# Patient Record
Sex: FEMALE | Race: OTHER | Employment: UNEMPLOYED | URBAN - METROPOLITAN AREA
[De-identification: names, ages, dates, MRNs, and addresses within clinical notes are randomized per-mention and may not be internally consistent; named-entity substitution may affect disease eponyms.]

---

## 2022-09-06 ENCOUNTER — HOSPITAL ENCOUNTER (EMERGENCY)
Age: 87
Discharge: HOME | End: 2022-09-06
Attending: EMERGENCY MEDICINE
Payer: COMMERCIAL

## 2022-09-06 VITALS
TEMPERATURE: 98.2 F | HEART RATE: 61 BPM | DIASTOLIC BLOOD PRESSURE: 61 MMHG | BODY MASS INDEX: 28.32 KG/M2 | HEIGHT: 65 IN | WEIGHT: 170 LBS | SYSTOLIC BLOOD PRESSURE: 107 MMHG | RESPIRATION RATE: 14 BRPM | OXYGEN SATURATION: 95 %

## 2022-09-06 DIAGNOSIS — Z71.1 NO PROBLEM, FEARED COMPLAINT UNFOUNDED: Primary | ICD-10-CM

## 2022-09-06 LAB
ABSOLUTE EOS #: 0.4 K/UL (ref 0–0.4)
ABSOLUTE LYMPH #: 1.3 K/UL (ref 1–4.8)
ABSOLUTE MONO #: 0.7 K/UL (ref 0.1–1.2)
ALBUMIN SERPL-MCNC: 4.1 G/DL (ref 3.5–5.2)
ALBUMIN/GLOBULIN RATIO: 1.5 (ref 1–2.5)
ALP BLD-CCNC: 91 U/L (ref 35–104)
ALT SERPL-CCNC: 36 U/L (ref 5–33)
ANION GAP SERPL CALCULATED.3IONS-SCNC: 13 MMOL/L (ref 9–17)
AST SERPL-CCNC: 31 U/L
BASOPHILS # BLD: 2 % (ref 0–2)
BASOPHILS ABSOLUTE: 0.2 K/UL (ref 0–0.2)
BILIRUB SERPL-MCNC: 0.2 MG/DL (ref 0.3–1.2)
BUN BLDV-MCNC: 23 MG/DL (ref 8–23)
CALCIUM SERPL-MCNC: 9.8 MG/DL (ref 8.6–10.4)
CHLORIDE BLD-SCNC: 100 MMOL/L (ref 98–107)
CO2: 24 MMOL/L (ref 20–31)
CREAT SERPL-MCNC: 0.89 MG/DL (ref 0.5–0.9)
EOSINOPHILS RELATIVE PERCENT: 4 % (ref 1–4)
GFR AFRICAN AMERICAN: >60 ML/MIN
GFR NON-AFRICAN AMERICAN: >60 ML/MIN
GFR SERPL CREATININE-BSD FRML MDRD: ABNORMAL ML/MIN/{1.73_M2}
GLUCOSE BLD-MCNC: 117 MG/DL (ref 70–99)
HCT VFR BLD CALC: 37.6 % (ref 36–46)
HEMOGLOBIN: 12.5 G/DL (ref 12–16)
LYMPHOCYTES # BLD: 13 % (ref 24–44)
MAGNESIUM: 1.9 MG/DL (ref 1.6–2.6)
MCH RBC QN AUTO: 28.4 PG (ref 26–34)
MCHC RBC AUTO-ENTMCNC: 33.2 G/DL (ref 31–37)
MCV RBC AUTO: 85.4 FL (ref 80–100)
MONOCYTES # BLD: 8 % (ref 2–11)
PDW BLD-RTO: 15.1 % (ref 12.5–15.4)
PLATELET # BLD: 294 K/UL (ref 140–450)
PMV BLD AUTO: 8.6 FL (ref 6–12)
POTASSIUM SERPL-SCNC: 5.3 MMOL/L (ref 3.7–5.3)
RBC # BLD: 4.4 M/UL (ref 4–5.2)
SEG NEUTROPHILS: 73 % (ref 36–66)
SEGMENTED NEUTROPHILS ABSOLUTE COUNT: 7 K/UL (ref 1.8–7.7)
SODIUM BLD-SCNC: 137 MMOL/L (ref 135–144)
TOTAL PROTEIN: 6.8 G/DL (ref 6.4–8.3)
WBC # BLD: 9.6 K/UL (ref 3.5–11)

## 2022-09-06 PROCEDURE — 83735 ASSAY OF MAGNESIUM: CPT

## 2022-09-06 PROCEDURE — 36415 COLL VENOUS BLD VENIPUNCTURE: CPT

## 2022-09-06 PROCEDURE — 80053 COMPREHEN METABOLIC PANEL: CPT

## 2022-09-06 PROCEDURE — 85025 COMPLETE CBC W/AUTO DIFF WBC: CPT

## 2022-09-06 PROCEDURE — 99283 EMERGENCY DEPT VISIT LOW MDM: CPT

## 2022-09-06 ASSESSMENT — PAIN - FUNCTIONAL ASSESSMENT: PAIN_FUNCTIONAL_ASSESSMENT: NONE - DENIES PAIN

## 2022-09-07 ASSESSMENT — ENCOUNTER SYMPTOMS
NAUSEA: 0
EYE PAIN: 0
SHORTNESS OF BREATH: 0
BACK PAIN: 0
DIARRHEA: 0
ABDOMINAL PAIN: 0
RHINORRHEA: 0
COUGH: 0
SORE THROAT: 0
VOMITING: 0

## 2022-09-07 NOTE — DISCHARGE INSTRUCTIONS
Your potassium level is normal however it is in the higher range of normal.  Talk to your doctor about this. PLEASE RETURN TO THE EMERGENCY DEPARTMENT IMMEDIATELY if your symptoms worsen in anyway or in 8-12 hours if not improved for re-evaluation. You should immediately return to the ER for symptoms such as increasing pain, shortness of breath, fever, a feeling of passing out, light headed, dizziness, abdominal pain, numbness or weakness to the arms or legs, coolness or color change of the arms or legs. Take your medication as indicated and prescribed. If you are given an antibiotic then, make sure you get the prescription filled and take the antibiotics until finished. Please understand that at this time there is no evidence for a more serious underlying process, but that early in the process of an illness or injury, an emergency department workup can be falsely reassuring. You should contact your family doctor within the next 24 hours for a follow up appointment    Celso Santana!!!    From [facility] ([facility]) and [address] [place] [address] Emergency Services    On behalf of the Emergency Department staff at [facility]), I would like to thank you for giving us the opportunity to address your health care needs and concerns. We hope that during your visit, our service was delivered in a professional and caring manner. Please keep [facility] ([facility]) in mind as we walk with you down the path to your own personal wellness. Please expect an automated text message or email from us so we can ask a few questions about your health and progress. Based on your answers, a clinician may call you back to offer help and instructions. Please understand that early in the process of an illness or injury, an emergency department workup can be falsely reassuring. If you notice any worsening, changing or persistent symptoms please call your family doctor or return to the ER immediately.      Tell us how we did during your visit at [email or web address]   and let us know about your experience

## 2022-09-07 NOTE — ED PROVIDER NOTES
[address] [facility]  [address] [facility] [address]  Phone: [phone]  Fax: [phone] [facility]          Pt Name: Dav Marc  MRN: [id number]  [place] 1935  Date of evaluation: 9/6/2022      CHIEF COMPLAINT       Chief Complaint   Patient presents with    Other     Hyperkalemia - blood test 9/6/22 was 6       HISTORY OF PRESENT ILLNESS       Dav Marc is a 81 y.o. female who presents with concern about high potassium. Patient had outpatient labs drawn earlier this afternoon at the [facility] clinic. They were called by their PCP today and sent to come here for further evaluation because the potassium was 6.0. Patient is otherwise asymptomatic. No history of renal failure or known renal disease. Never been on dialysis. Patient is here with her son. No other symptoms or concerns. REVIEW OF SYSTEMS       Review of Systems   Constitutional:  Negative for chills, fatigue and fever. HENT:  Negative for rhinorrhea and sore throat. Eyes:  Negative for pain. Respiratory:  Negative for cough and shortness of breath. Cardiovascular:  Negative for chest pain. Gastrointestinal:  Negative for abdominal pain, diarrhea, nausea and vomiting. Genitourinary:  Negative for difficulty urinating. Musculoskeletal:  Negative for back pain and neck pain. Skin:  Negative for rash. Neurological:  Negative for weakness and headaches. PAST MEDICAL HISTORY    has no past medical history on file. SURGICAL HISTORY      has no past surgical history on file. CURRENT MEDICATIONS     There are no discharge medications for this patient. ALLERGIES     has No Known Allergies. FAMILY HISTORY     has no family status information on file. family history is not on file. SOCIAL HISTORY          PHYSICAL EXAM     INITIAL VITALS:  height is 5' 5\" (1.651 m) and weight is 77.1 kg (170 lb). Her oral temperature is 98.2 °F (36.8 °C).  Her blood pressure is 107/61 and her pulse is 61. Her respiration is 14 and oxygen saturation is 95%. Physical Exam  Vitals reviewed. Constitutional:       General: She is not in acute distress. Appearance: She is well-developed. She is not ill-appearing or toxic-appearing. HENT:      Head: Normocephalic and atraumatic. Right Ear: External ear normal.      Left Ear: External ear normal.   Eyes:      General: Lids are normal.   Neck:      Trachea: No tracheal deviation. Cardiovascular:      Rate and Rhythm: Normal rate and regular rhythm. Pulmonary:      Effort: Pulmonary effort is normal. No respiratory distress. Skin:     General: Skin is warm and dry. Neurological:      Mental Status: She is alert. GCS: GCS eye subscore is 4. GCS verbal subscore is 5. GCS motor subscore is 6. Psychiatric:         Speech: Speech normal.       DIFFERENTIAL DIAGNOSIS/ MDM:     Plan to be to recheck baseline labs. DIAGNOSTIC RESULTS     EKG: All EKG's are interpreted by the Emergency Department Physician who either signs or Co-signs this chart in the absence of a cardiologist.        RADIOLOGY:   Interpretation per the Radiologist below, if available at the time of this note:  No orders to display       No results found.     LABS:  Results for orders placed or performed during the hospital encounter of 09/06/22   Magnesium   Result Value Ref Range    Magnesium 1.9 1.6 - 2.6 mg/dL   CBC with Auto Differential   Result Value Ref Range    WBC 9.6 3.5 - 11.0 k/uL    RBC 4.40 4.0 - 5.2 m/uL    Hemoglobin 12.5 12.0 - 16.0 g/dL    Hematocrit 37.6 36 - 46 %    MCV 85.4 80 - 100 fL    MCH 28.4 26 - 34 pg    MCHC 33.2 31 - 37 g/dL    RDW 15.1 12.5 - 15.4 %    Platelets 503 304 - 240 k/uL    MPV 8.6 6.0 - 12.0 fL    Seg Neutrophils 73 (H) 36 - 66 %    Lymphocytes 13 (L) 24 - 44 %    Monocytes 8 2 - 11 %    Eosinophils % 4 1 - 4 %    Basophils 2 0 - 2 %    Segs Absolute 7.00 1.8 - 7.7 k/uL    Absolute Lymph # 1.30 1.0 - 4.8 k/uL    Absolute Mono # 0.70 0.1 - 1.2 k/uL    Absolute Eos # 0.40 0.0 - 0.4 k/uL    Basophils Absolute 0.20 0.0 - 0.2 k/uL   Comprehensive Metabolic Panel   Result Value Ref Range    Glucose 117 (H) 70 - 99 mg/dL    BUN 23 8 - 23 mg/dL    Creatinine 0.89 0.50 - 0.90 mg/dL    Calcium 9.8 8.6 - 10.4 mg/dL    Sodium 137 135 - 144 mmol/L    Potassium 5.3 3.7 - 5.3 mmol/L    Chloride 100 98 - 107 mmol/L    CO2 24 20 - 31 mmol/L    Anion Gap 13 9 - 17 mmol/L    Alkaline Phosphatase 91 35 - 104 U/L    ALT 36 (H) 5 - 33 U/L    AST 31 <32 U/L    Total Bilirubin 0.2 (L) 0.3 - 1.2 mg/dL    Total Protein 6.8 6.4 - 8.3 g/dL    Albumin 4.1 3.5 - 5.2 g/dL    Albumin/Globulin Ratio 1.5 1.0 - 2.5    GFR Non-African American >60 >60 mL/min    GFR African American >60 >60 mL/min    GFR Comment             EMERGENCY DEPARTMENT COURSE:     The patient was given the following medications:  No orders of the defined types were placed in this encounter. Vitals:    Vitals:    09/06/22 2155   BP: 107/61   Pulse: 61   Resp: 14   Temp: 98.2 °F (36.8 °C)   TempSrc: Oral   SpO2: 95%   Weight: 77.1 kg (170 lb)   Height: 5' 5\" (1.651 m)     -------------------------  BP: 107/61, Temp: 98.2 °F (36.8 °C), Heart Rate: 61, Resp: 14      Re-evaluation Notes    Patient remains asymptomatic. Potassium is 5.3 which is normal however high end of normal.  I do not feel she requires admission or further work-up or consult this evening. Advised to follow-up with the PCP and discussed the potassium level further. They are comfortable with this plan. The patient understands that at this time there is no evidence for a more malignant underlying process, but the patient also understands that early in the process of an illness or injury, an emergency department workup can be falsely reassuring.   Routine discharge counseling was given, and the patient understands that worsening, changing or persistent symptoms should prompt an immediate call or follow up with their primary physician or return to the emergency department. The importance of appropriate follow up was also discussed. I have reviewed the disposition diagnosis with the patient and or their family/guardian. I have answered their questions and given discharge instructions. They voiced understanding of these instructions and did not have any further questions or complaints. CONSULTS:    None    CRITICAL CARE:     None    PROCEDURES:    None    FINAL IMPRESSION      1. No problem, feared complaint unfounded          DISPOSITION/PLAN   DISPOSITION Decision To Discharge 09/06/2022 10:33:26 PM      Condition on Disposition    Improved    PATIENT REFERRED TO:  Jeanine Pike MD  [address] [place] [address]  [phone]    Schedule an appointment as soon as possible for a visit in 2 days        DISCHARGE MEDICATIONS:  There are no discharge medications for this patient.       (Please note that portions of this note were completed with a voice recognition program.  Efforts were made to edit the dictations but occasionally words are mis-transcribed.)    Addy Paula DO, DO  Attending Emergency Physician       Addy Paula DO  09/07/22 0020

## 2023-02-03 ENCOUNTER — APPOINTMENT (OUTPATIENT)
Dept: CT IMAGING | Age: 88
DRG: 087 | End: 2023-02-03
Payer: COMMERCIAL

## 2023-02-03 ENCOUNTER — HOSPITAL ENCOUNTER (INPATIENT)
Age: 88
LOS: 2 days | Discharge: HOME HEALTH | DRG: 087 | End: 2023-02-05
Attending: EMERGENCY MEDICINE | Admitting: SURGERY
Payer: COMMERCIAL

## 2023-02-03 DIAGNOSIS — I60.9 SUBARACHNOID BLEED (HCC): Primary | ICD-10-CM

## 2023-02-03 PROBLEM — I62.9 INTRACRANIAL HEMORRHAGE (HCC): Status: ACTIVE | Noted: 2023-02-03

## 2023-02-03 LAB
ABO/RH: NORMAL
ANION GAP SERPL CALCULATED.3IONS-SCNC: 12 MMOL/L (ref 9–17)
ANTIBODY SCREEN: NEGATIVE
ARM BAND NUMBER: NORMAL
BLOOD BANK SPECIMEN: ABNORMAL
BUN SERPL-MCNC: 19 MG/DL (ref 8–23)
CARBOXYHEMOGLOBIN: 2.5 % (ref 0–5)
CHLORIDE SERPL-SCNC: 100 MMOL/L (ref 98–107)
CO2 SERPL-SCNC: 24 MMOL/L (ref 20–31)
CREAT SERPL-MCNC: 0.83 MG/DL (ref 0.5–0.9)
ETHANOL PERCENT: <0.01 %
ETHANOL: <10 MG/DL
EXPIRATION DATE: NORMAL
FIO2: ABNORMAL
GFR SERPL CREATININE-BSD FRML MDRD: >60 ML/MIN/1.73M2
GLUCOSE SERPL-MCNC: 291 MG/DL (ref 70–99)
HCG QUALITATIVE: ABNORMAL
HCO3 VENOUS: 23.1 MMOL/L (ref 24–30)
HCT VFR BLD AUTO: 40 % (ref 36.3–47.1)
HGB BLD-MCNC: 12.8 G/DL (ref 11.9–15.1)
INR PPP: 1
MCH RBC QN AUTO: 28.1 PG (ref 25.2–33.5)
MCHC RBC AUTO-ENTMCNC: 32 G/DL (ref 28.4–34.8)
MCV RBC AUTO: 87.7 FL (ref 82.6–102.9)
NEGATIVE BASE EXCESS, VEN: 1.5 MMOL/L (ref 0–2)
NRBC AUTOMATED: 0 PER 100 WBC
O2 SAT, VEN: 84.4 % (ref 60–85)
PARTIAL THROMBOPLASTIN TIME: 24.3 SEC (ref 20.5–30.5)
PATIENT TEMP: 37
PCO2, VEN: 40.6 MM HG (ref 39–55)
PDW BLD-RTO: 15.2 % (ref 11.8–14.4)
PH VENOUS: 7.37 (ref 7.32–7.42)
PLATELET # BLD AUTO: 339 K/UL (ref 138–453)
PMV BLD AUTO: 10.9 FL (ref 8.1–13.5)
PO2, VEN: 50 MM HG (ref 30–50)
POTASSIUM SERPL-SCNC: 4.7 MMOL/L (ref 3.7–5.3)
PROTHROMBIN TIME: 10.6 SEC (ref 9.1–12.3)
RBC # BLD: 4.56 M/UL (ref 3.95–5.11)
SODIUM SERPL-SCNC: 136 MMOL/L (ref 135–144)
WBC # BLD AUTO: 10.1 K/UL (ref 3.5–11.3)

## 2023-02-03 PROCEDURE — 84703 CHORIONIC GONADOTROPIN ASSAY: CPT

## 2023-02-03 PROCEDURE — 86900 BLOOD TYPING SEROLOGIC ABO: CPT

## 2023-02-03 PROCEDURE — 85027 COMPLETE CBC AUTOMATED: CPT

## 2023-02-03 PROCEDURE — 70450 CT HEAD/BRAIN W/O DYE: CPT

## 2023-02-03 PROCEDURE — 82805 BLOOD GASES W/O2 SATURATION: CPT

## 2023-02-03 PROCEDURE — 84520 ASSAY OF UREA NITROGEN: CPT

## 2023-02-03 PROCEDURE — 85610 PROTHROMBIN TIME: CPT

## 2023-02-03 PROCEDURE — 80051 ELECTROLYTE PANEL: CPT

## 2023-02-03 PROCEDURE — G0480 DRUG TEST DEF 1-7 CLASSES: HCPCS

## 2023-02-03 PROCEDURE — 86901 BLOOD TYPING SEROLOGIC RH(D): CPT

## 2023-02-03 PROCEDURE — 6360000004 HC RX CONTRAST MEDICATION: Performed by: STUDENT IN AN ORGANIZED HEALTH CARE EDUCATION/TRAINING PROGRAM

## 2023-02-03 PROCEDURE — 99285 EMERGENCY DEPT VISIT HI MDM: CPT

## 2023-02-03 PROCEDURE — 82947 ASSAY GLUCOSE BLOOD QUANT: CPT

## 2023-02-03 PROCEDURE — 6810039001 HC L1 TRAUMA PRIORITY

## 2023-02-03 PROCEDURE — 86850 RBC ANTIBODY SCREEN: CPT

## 2023-02-03 PROCEDURE — 85730 THROMBOPLASTIN TIME PARTIAL: CPT

## 2023-02-03 PROCEDURE — 71260 CT THORAX DX C+: CPT

## 2023-02-03 PROCEDURE — 82565 ASSAY OF CREATININE: CPT

## 2023-02-03 PROCEDURE — 2000000000 HC ICU R&B

## 2023-02-03 PROCEDURE — 3209999900 CT LUMBAR SPINE TRAUMA RECONSTRUCTION

## 2023-02-03 PROCEDURE — 3209999900 CT THORACIC SPINE TRAUMA RECONSTRUCTION

## 2023-02-03 RX ORDER — GABAPENTIN 300 MG/1
300 CAPSULE ORAL EVERY 8 HOURS
Status: DISCONTINUED | OUTPATIENT
Start: 2023-02-03 | End: 2023-02-05 | Stop reason: HOSPADM

## 2023-02-03 RX ORDER — GLIPIZIDE 10 MG/1
10 TABLET ORAL 2 TIMES DAILY
COMMUNITY
Start: 2023-01-24

## 2023-02-03 RX ORDER — ALENDRONATE SODIUM 70 MG/1
70 TABLET ORAL WEEKLY
COMMUNITY
Start: 2020-07-08

## 2023-02-03 RX ORDER — DEXTROSE MONOHYDRATE 100 MG/ML
INJECTION, SOLUTION INTRAVENOUS CONTINUOUS PRN
Status: DISCONTINUED | OUTPATIENT
Start: 2023-02-03 | End: 2023-02-05 | Stop reason: HOSPADM

## 2023-02-03 RX ORDER — LEVOTHYROXINE SODIUM 0.1 MG/1
100 TABLET ORAL
COMMUNITY
Start: 2023-01-24

## 2023-02-03 RX ORDER — INSULIN LISPRO 100 [IU]/ML
0-4 INJECTION, SOLUTION INTRAVENOUS; SUBCUTANEOUS NIGHTLY
Status: DISCONTINUED | OUTPATIENT
Start: 2023-02-03 | End: 2023-02-05 | Stop reason: HOSPADM

## 2023-02-03 RX ORDER — DOCUSATE SODIUM 100 MG/1
100 CAPSULE, LIQUID FILLED ORAL DAILY
COMMUNITY
Start: 2023-01-30

## 2023-02-03 RX ORDER — METHOCARBAMOL 750 MG/1
750 TABLET, FILM COATED ORAL EVERY 6 HOURS
Status: DISCONTINUED | OUTPATIENT
Start: 2023-02-03 | End: 2023-02-05 | Stop reason: HOSPADM

## 2023-02-03 RX ORDER — ASPIRIN 81 MG/1
81 TABLET ORAL DAILY
COMMUNITY

## 2023-02-03 RX ORDER — SODIUM CHLORIDE 0.9 % (FLUSH) 0.9 %
5-40 SYRINGE (ML) INJECTION EVERY 12 HOURS SCHEDULED
Status: DISCONTINUED | OUTPATIENT
Start: 2023-02-03 | End: 2023-02-05 | Stop reason: HOSPADM

## 2023-02-03 RX ORDER — ATORVASTATIN CALCIUM 40 MG/1
40 TABLET, FILM COATED ORAL DAILY
COMMUNITY
Start: 2022-11-09

## 2023-02-03 RX ORDER — SODIUM CHLORIDE 9 MG/ML
INJECTION, SOLUTION INTRAVENOUS PRN
Status: DISCONTINUED | OUTPATIENT
Start: 2023-02-03 | End: 2023-02-05 | Stop reason: HOSPADM

## 2023-02-03 RX ORDER — ONDANSETRON 2 MG/ML
4 INJECTION INTRAMUSCULAR; INTRAVENOUS EVERY 6 HOURS PRN
Status: DISCONTINUED | OUTPATIENT
Start: 2023-02-03 | End: 2023-02-05 | Stop reason: HOSPADM

## 2023-02-03 RX ORDER — ONDANSETRON 4 MG/1
4 TABLET, ORALLY DISINTEGRATING ORAL EVERY 8 HOURS PRN
Status: DISCONTINUED | OUTPATIENT
Start: 2023-02-03 | End: 2023-02-05 | Stop reason: HOSPADM

## 2023-02-03 RX ORDER — OXYCODONE HYDROCHLORIDE 5 MG/1
5 TABLET ORAL EVERY 6 HOURS PRN
Status: DISCONTINUED | OUTPATIENT
Start: 2023-02-03 | End: 2023-02-05 | Stop reason: HOSPADM

## 2023-02-03 RX ORDER — VALSARTAN 80 MG/1
80 TABLET ORAL DAILY
COMMUNITY
Start: 2023-01-04

## 2023-02-03 RX ORDER — TRAMADOL HYDROCHLORIDE 50 MG/1
50 TABLET ORAL 2 TIMES DAILY PRN
COMMUNITY
Start: 2023-01-17

## 2023-02-03 RX ORDER — RIVASTIGMINE TARTRATE 3 MG/1
3 CAPSULE ORAL 2 TIMES DAILY
COMMUNITY
Start: 2022-11-17

## 2023-02-03 RX ORDER — ACETAMINOPHEN 500 MG
1000 TABLET ORAL EVERY 8 HOURS
Status: DISCONTINUED | OUTPATIENT
Start: 2023-02-03 | End: 2023-02-05 | Stop reason: HOSPADM

## 2023-02-03 RX ORDER — METOPROLOL TARTRATE 5 MG/5ML
5 INJECTION INTRAVENOUS EVERY 6 HOURS
Status: DISCONTINUED | OUTPATIENT
Start: 2023-02-03 | End: 2023-02-04

## 2023-02-03 RX ORDER — CARBIDOPA AND LEVODOPA 25; 100 MG/1; MG/1
25-100 TABLET, EXTENDED RELEASE ORAL 2 TIMES DAILY
COMMUNITY
Start: 2023-01-14

## 2023-02-03 RX ORDER — SODIUM CHLORIDE 0.9 % (FLUSH) 0.9 %
5-40 SYRINGE (ML) INJECTION PRN
Status: DISCONTINUED | OUTPATIENT
Start: 2023-02-03 | End: 2023-02-05 | Stop reason: HOSPADM

## 2023-02-03 RX ORDER — FUROSEMIDE 20 MG/1
20 TABLET ORAL
COMMUNITY
Start: 2023-01-20

## 2023-02-03 RX ORDER — EZETIMIBE 10 MG/1
10 TABLET ORAL DAILY
COMMUNITY
Start: 2022-12-30

## 2023-02-03 RX ORDER — BUSPIRONE HYDROCHLORIDE 5 MG/1
5 TABLET ORAL 2 TIMES DAILY
COMMUNITY
Start: 2023-01-24

## 2023-02-03 RX ORDER — CLOPIDOGREL BISULFATE 75 MG/1
75 TABLET ORAL DAILY
Status: ON HOLD | COMMUNITY
Start: 2023-01-24 | End: 2023-02-05 | Stop reason: HOSPADM

## 2023-02-03 RX ORDER — INSULIN LISPRO 100 [IU]/ML
0-16 INJECTION, SOLUTION INTRAVENOUS; SUBCUTANEOUS
Status: DISCONTINUED | OUTPATIENT
Start: 2023-02-04 | End: 2023-02-05 | Stop reason: HOSPADM

## 2023-02-03 RX ORDER — SODIUM CHLORIDE 9 MG/ML
INJECTION, SOLUTION INTRAVENOUS CONTINUOUS
Status: DISCONTINUED | OUTPATIENT
Start: 2023-02-03 | End: 2023-02-04

## 2023-02-03 RX ADMIN — IOPAMIDOL 130 ML: 755 INJECTION, SOLUTION INTRAVENOUS at 22:37

## 2023-02-03 ASSESSMENT — PAIN - FUNCTIONAL ASSESSMENT: PAIN_FUNCTIONAL_ASSESSMENT: 0-10

## 2023-02-03 ASSESSMENT — ENCOUNTER SYMPTOMS
SHORTNESS OF BREATH: 0
ABDOMINAL PAIN: 0
VOMITING: 0
BACK PAIN: 0
COUGH: 0
SORE THROAT: 0

## 2023-02-03 ASSESSMENT — PAIN SCALES - GENERAL: PAINLEVEL_OUTOF10: 10

## 2023-02-04 ENCOUNTER — APPOINTMENT (OUTPATIENT)
Dept: CT IMAGING | Age: 88
DRG: 087 | End: 2023-02-04
Payer: COMMERCIAL

## 2023-02-04 PROBLEM — S06.6X0A SUBARACHNOID HEMORRHAGE FOLLOWING INJURY, NO LOSS OF CONSCIOUSNESS (HCC): Status: ACTIVE | Noted: 2023-02-04

## 2023-02-04 LAB
ABSOLUTE EOS #: 0.32 K/UL (ref 0–0.44)
ABSOLUTE IMMATURE GRANULOCYTE: 0.04 K/UL (ref 0–0.3)
ABSOLUTE LYMPH #: 1.42 K/UL (ref 1.1–3.7)
ABSOLUTE MONO #: 0.7 K/UL (ref 0.1–1.2)
ANION GAP SERPL CALCULATED.3IONS-SCNC: 12 MMOL/L (ref 9–17)
BASOPHILS # BLD: 1 % (ref 0–2)
BASOPHILS ABSOLUTE: 0.07 K/UL (ref 0–0.2)
BUN SERPL-MCNC: 16 MG/DL (ref 8–23)
CALCIUM SERPL-MCNC: 9 MG/DL (ref 8.6–10.4)
CHLORIDE SERPL-SCNC: 100 MMOL/L (ref 98–107)
CO2 SERPL-SCNC: 21 MMOL/L (ref 20–31)
CREAT SERPL-MCNC: 0.74 MG/DL (ref 0.5–0.9)
EOSINOPHILS RELATIVE PERCENT: 4 % (ref 1–4)
GFR SERPL CREATININE-BSD FRML MDRD: >60 ML/MIN/1.73M2
GLUCOSE BLD-MCNC: 131 MG/DL (ref 65–105)
GLUCOSE BLD-MCNC: 197 MG/DL (ref 65–105)
GLUCOSE BLD-MCNC: 199 MG/DL (ref 65–105)
GLUCOSE BLD-MCNC: 211 MG/DL (ref 65–105)
GLUCOSE BLD-MCNC: 78 MG/DL (ref 65–105)
GLUCOSE SERPL-MCNC: 77 MG/DL (ref 70–99)
HCT VFR BLD AUTO: 40.8 % (ref 36.3–47.1)
HGB BLD-MCNC: 12.5 G/DL (ref 11.9–15.1)
IMMATURE GRANULOCYTES: 1 %
LYMPHOCYTES # BLD: 17 % (ref 24–43)
MCH RBC QN AUTO: 27.8 PG (ref 25.2–33.5)
MCHC RBC AUTO-ENTMCNC: 30.6 G/DL (ref 28.4–34.8)
MCV RBC AUTO: 90.7 FL (ref 82.6–102.9)
MONOCYTES # BLD: 8 % (ref 3–12)
NRBC AUTOMATED: 0 PER 100 WBC
PDW BLD-RTO: 15.1 % (ref 11.8–14.4)
PLATELET # BLD AUTO: 291 K/UL (ref 138–453)
PMV BLD AUTO: 10.4 FL (ref 8.1–13.5)
POTASSIUM SERPL-SCNC: 4.2 MMOL/L (ref 3.7–5.3)
RBC # BLD: 4.5 M/UL (ref 3.95–5.11)
RBC # BLD: ABNORMAL 10*6/UL
SEG NEUTROPHILS: 69 % (ref 36–65)
SEGMENTED NEUTROPHILS ABSOLUTE COUNT: 5.81 K/UL (ref 1.5–8.1)
SODIUM SERPL-SCNC: 133 MMOL/L (ref 135–144)
WBC # BLD AUTO: 8.4 K/UL (ref 3.5–11.3)

## 2023-02-04 PROCEDURE — 6370000000 HC RX 637 (ALT 250 FOR IP)

## 2023-02-04 PROCEDURE — 82947 ASSAY GLUCOSE BLOOD QUANT: CPT

## 2023-02-04 PROCEDURE — 97530 THERAPEUTIC ACTIVITIES: CPT

## 2023-02-04 PROCEDURE — 2580000003 HC RX 258

## 2023-02-04 PROCEDURE — 85025 COMPLETE CBC W/AUTO DIFF WBC: CPT

## 2023-02-04 PROCEDURE — 80048 BASIC METABOLIC PNL TOTAL CA: CPT

## 2023-02-04 PROCEDURE — 70450 CT HEAD/BRAIN W/O DYE: CPT

## 2023-02-04 PROCEDURE — 36415 COLL VENOUS BLD VENIPUNCTURE: CPT

## 2023-02-04 PROCEDURE — 97162 PT EVAL MOD COMPLEX 30 MIN: CPT

## 2023-02-04 PROCEDURE — 99221 1ST HOSP IP/OBS SF/LOW 40: CPT | Performed by: NEUROLOGICAL SURGERY

## 2023-02-04 PROCEDURE — 1200000000 HC SEMI PRIVATE

## 2023-02-04 RX ORDER — ATORVASTATIN CALCIUM 40 MG/1
40 TABLET, FILM COATED ORAL DAILY
Status: DISCONTINUED | OUTPATIENT
Start: 2023-02-04 | End: 2023-02-05 | Stop reason: HOSPADM

## 2023-02-04 RX ORDER — LANOLIN ALCOHOL/MO/W.PET/CERES
1000 CREAM (GRAM) TOPICAL DAILY
COMMUNITY

## 2023-02-04 RX ORDER — CARBIDOPA AND LEVODOPA 25; 100 MG/1; MG/1
1 TABLET, EXTENDED RELEASE ORAL 2 TIMES DAILY
Status: DISCONTINUED | OUTPATIENT
Start: 2023-02-04 | End: 2023-02-05 | Stop reason: HOSPADM

## 2023-02-04 RX ORDER — LEVOTHYROXINE SODIUM 0.1 MG/1
100 TABLET ORAL
Status: DISCONTINUED | OUTPATIENT
Start: 2023-02-05 | End: 2023-02-05 | Stop reason: HOSPADM

## 2023-02-04 RX ADMIN — GABAPENTIN 300 MG: 300 CAPSULE ORAL at 05:43

## 2023-02-04 RX ADMIN — ACETAMINOPHEN 1000 MG: 500 TABLET ORAL at 05:43

## 2023-02-04 RX ADMIN — ACETAMINOPHEN 1000 MG: 500 TABLET ORAL at 01:12

## 2023-02-04 RX ADMIN — METHOCARBAMOL TABLETS 750 MG: 750 TABLET, COATED ORAL at 21:51

## 2023-02-04 RX ADMIN — OXYCODONE HYDROCHLORIDE 5 MG: 5 TABLET ORAL at 20:54

## 2023-02-04 RX ADMIN — CARBIDOPA AND LEVODOPA 1 TABLET: 25; 100 TABLET, EXTENDED RELEASE ORAL at 20:54

## 2023-02-04 RX ADMIN — METOPROLOL TARTRATE 25 MG: 25 TABLET ORAL at 20:53

## 2023-02-04 RX ADMIN — SODIUM CHLORIDE, PRESERVATIVE FREE 10 ML: 5 INJECTION INTRAVENOUS at 09:09

## 2023-02-04 RX ADMIN — SODIUM CHLORIDE: 9 INJECTION, SOLUTION INTRAVENOUS at 01:12

## 2023-02-04 RX ADMIN — ACETAMINOPHEN 1000 MG: 500 TABLET ORAL at 21:50

## 2023-02-04 RX ADMIN — ACETAMINOPHEN 1000 MG: 500 TABLET ORAL at 17:04

## 2023-02-04 RX ADMIN — GABAPENTIN 300 MG: 300 CAPSULE ORAL at 21:51

## 2023-02-04 RX ADMIN — SODIUM CHLORIDE, PRESERVATIVE FREE 10 ML: 5 INJECTION INTRAVENOUS at 21:08

## 2023-02-04 RX ADMIN — METHOCARBAMOL TABLETS 750 MG: 750 TABLET, COATED ORAL at 10:06

## 2023-02-04 RX ADMIN — DESMOPRESSIN ACETATE 40 MG: 0.2 TABLET ORAL at 18:26

## 2023-02-04 ASSESSMENT — ENCOUNTER SYMPTOMS
DIARRHEA: 0
WHEEZING: 0
CHEST TIGHTNESS: 0
NAUSEA: 0
ABDOMINAL PAIN: 0
SHORTNESS OF BREATH: 0
STRIDOR: 0
VOMITING: 0

## 2023-02-04 ASSESSMENT — PAIN SCALES - GENERAL
PAINLEVEL_OUTOF10: 3
PAINLEVEL_OUTOF10: 0
PAINLEVEL_OUTOF10: 8
PAINLEVEL_OUTOF10: 0
PAINLEVEL_OUTOF10: 1

## 2023-02-04 ASSESSMENT — PAIN DESCRIPTION - LOCATION: LOCATION: GENERALIZED

## 2023-02-04 ASSESSMENT — PAIN DESCRIPTION - DESCRIPTORS: DESCRIPTORS: ACHING;DISCOMFORT

## 2023-02-04 ASSESSMENT — LIFESTYLE VARIABLES
HOW MANY STANDARD DRINKS CONTAINING ALCOHOL DO YOU HAVE ON A TYPICAL DAY: PATIENT DOES NOT DRINK
HOW OFTEN DO YOU HAVE A DRINK CONTAINING ALCOHOL: NEVER

## 2023-02-04 NOTE — PROGRESS NOTES
[facility] joy- Dr. Clyde Ny updated on patient current status. Orders placed for step down- C spine cleared this AM, aspen does not need to be in place.  Patient able to eat and get OOBTC

## 2023-02-04 NOTE — PLAN OF CARE
Problem: Discharge Planning  Goal: Discharge to home or other facility with appropriate resources  Outcome: Progressing  Flowsheets (Taken 2/3/2023 2330 by Rashel Klein, RN)  Discharge to home or other facility with appropriate resources:   Identify barriers to discharge with patient and caregiver   Arrange for needed discharge resources and transportation as appropriate   Identify discharge learning needs (meds, wound care, etc)   Arrange for interpreters to assist at discharge as needed   Refer to discharge planning if patient needs post-hospital services based on physician order or complex needs related to functional status, cognitive ability or social support system     Problem: Pain  Goal: Verbalizes/displays adequate comfort level or baseline comfort level  Outcome: Progressing  Flowsheets (Taken 2/4/2023 0000 by Rashel Klein, RN)  Verbalizes/displays adequate comfort level or baseline comfort level:   Assess pain using appropriate pain scale   Encourage patient to monitor pain and request assistance   Administer analgesics based on type and severity of pain and evaluate response   Implement non-pharmacological measures as appropriate and evaluate response   Consider cultural and social influences on pain and pain management     Problem: Safety - Adult  Goal: Free from fall injury  Outcome: Progressing  Flowsheets (Taken 2/4/2023 0135 by Rashel Klein, RN)  Free From Fall Injury:   Instruct family/caregiver on patient safety   Based on caregiver fall risk screen, instruct family/caregiver to ask for assistance with transferring infant if caregiver noted to have fall risk factors     Problem: ABCDS Injury Assessment  Goal: Absence of physical injury  Outcome: Progressing  Flowsheets (Taken 2/4/2023 0135 by Rashel Klein, RN)  Absence of Physical Injury: Implement safety measures based on patient assessment

## 2023-02-04 NOTE — PLAN OF CARE
Problem: Discharge Planning  Goal: Discharge to home or other facility with appropriate resources  2/4/2023 1704 by Leticia Tirado RN  Outcome: Progressing  2/4/2023 1100 by Gilberto Caruso RN  Outcome: Progressing  Flowsheets (Taken 2/3/2023 2330 by Sarah Guardado RN)  Discharge to home or other facility with appropriate resources:   Identify barriers to discharge with patient and caregiver   Arrange for needed discharge resources and transportation as appropriate   Identify discharge learning needs (meds, wound care, etc)   Arrange for interpreters to assist at discharge as needed   Refer to discharge planning if patient needs post-hospital services based on physician order or complex needs related to functional status, cognitive ability or social support system     Problem: Pain  Goal: Verbalizes/displays adequate comfort level or baseline comfort level  2/4/2023 1704 by Leticia Tirado RN  Outcome: Progressing  2/4/2023 1100 by Gilberto Caruso RN  Outcome: Progressing  Flowsheets (Taken 2/4/2023 0000 by Sarah Guardado RN)  Verbalizes/displays adequate comfort level or baseline comfort level:   Assess pain using appropriate pain scale   Encourage patient to monitor pain and request assistance   Administer analgesics based on type and severity of pain and evaluate response   Implement non-pharmacological measures as appropriate and evaluate response   Consider cultural and social influences on pain and pain management     Problem: Safety - Adult  Goal: Free from fall injury  2/4/2023 1704 by Leticia Tirado RN  Outcome: Progressing  2/4/2023 1100 by Gilberto Caruso RN  Outcome: Progressing  Flowsheets (Taken 2/4/2023 0135 by Sarah Guardado, RN)  Free From Fall Injury:   Instruct family/caregiver on patient safety   Based on caregiver fall risk screen, instruct family/caregiver to ask for assistance with transferring infant if caregiver noted to have fall risk factors     Problem: ABCDS Injury Assessment  Goal: Absence of physical injury  2/4/2023 1704 by Luciana Acevedo RN  Outcome: Progressing  2/4/2023 1100 by Ania Bernard RN  Outcome: Progressing  Flowsheets (Taken 2/4/2023 0135 by Charla Wheeler RN)  Absence of Physical Injury: Implement safety measures based on patient assessment

## 2023-02-04 NOTE — ED NOTES
80 yo Female  Darcus More fall, face first  No LOC  Witnessed  SAH  On plavix  Face and neck CT neg  VSS  Accepted byDr Po Barros RN  02/03/23 5932

## 2023-02-04 NOTE — CARE COORDINATION
Case Management Assessment  Initial Evaluation    Date/Time of Evaluation: 2/4/2023 1:07 PM  Assessment Completed by: Luisito Malave RN    If patient is discharged prior to next notation, then this note serves as note for discharge by case management. Patient Name: May Fitch                   YOB: 1935  Diagnosis: Intracranial hemorrhage ([facility] [address] [I62.9]  Subarachnoid bleed ([facility] [address] [I60.9]                   Date / Time: 2/3/2023 10:03 PM    Patient Admission Status: Inpatient   Readmission Risk (Low < 19, Mod (19-27), High > 27): Readmission Risk Score: 8.7    Current PCP: Al Rivera MD  PCP verified by CM? (P) Yes    Chart Reviewed: Yes      History Provided by: (P) Child/Family  Patient Orientation: (P) Unable to Assess    Patient Cognition:      Hospitalization in the last 30 days (Readmission):  No    If yes, Readmission Assessment in  Navigator will be completed. Advance Directives:      Code Status: Full Code   Patient's Primary Decision Maker is:        Discharge Planning:    Patient lives with: (P) Children Type of Home: (P) House  Primary Care Giver: (P) Family  Patient Support Systems include: (P) Family Members   Current Financial resources: (P) Medicare, Medicaid  Current community resources:    Current services prior to admission: (P) Durable Medical Equipment            Current DME: (P) [employer]            Type of Home Care services:  (P) None    ADLS  Prior functional level: (P) Assistance with the following:, Bathing, Dressing, Toileting, Cooking, Housework, Shopping, Mobility  Current functional level: (P) Assistance with the following:, Bathing, Dressing, Toileting, Cooking, Housework, Shopping, Mobility    PT AM-PAC:   /24  OT AM-PAC:   /24    Family can provide assistance at DC: (P) Yes  Would you like Case Management to discuss the discharge plan with any other family members/significant others, and if so, who?     Plans to Return to Present Housing: (P) Yes  Other Identified Issues/Barriers to RETURNING to current housing: confusion, weakness  Potential Assistance needed at discharge: (P) N/A            Potential DME:    Patient expects to discharge to: (P) [facility] for transportation at discharge: (P) Family    Financial    Payor: Durga Syed / Plan: Salud Erps / Product Type: *No Product type* /     Does insurance require precert for SNF: Yes    Potential assistance Purchasing Medications:    Meds-to-Beds request:      No Pharmacies Listed    Notes:    Factors facilitating achievement of predicted outcomes: Family support, Caregiver support, Has needed Durable Medical Equipment at home, and Has homemaker services    Barriers to discharge: Medical complications    Additional Case Management Notes: patient returning home with her son and daughter-in-law. Her daughter-in-law helps care for her at home. Her son, Floydene Riedel, denies needs. He will transport patient home    The Plan for Transition of Care is related to the following treatment goals of Intracranial hemorrhage (HCC) [I62.9]  Subarachnoid bleed ([address] [O77.6]    IF APPLICABLE: The Patient and/or patient representative Vinicius Murphy and her family were provided with a choice of provider and agrees with the discharge plan.  Freedom of choice list with basic dialogue that supports the patient's individualized plan of care/goals and shares the quality data associated with the providers was provided to: (P) Patient Representative   Patient Representative Name: (P) son, Floydene Riedel     The Patient and/or Patient Representative Agree with the Discharge Plan?  yes    Rodri Nam RN  Case Management Department  Ph: [phone] Fax: [phone]

## 2023-02-04 NOTE — CONSULTS
Department of Neurosurgery                                       Resident Consult Note      Reason for Consult:  [facility]  Requesting Physician:  Dr. Geo Adam:   [x]Dr. Vi Garrido    History Obtained From:  patient, electronic medical record    CHIEF COMPLAINT:         Fall, on plavix    HISTORY OF PRESENT ILLNESS:       The patient is a 81 y.o. female who presented as a transfer from [facility] where she presented due to a fall from standing height onto her face. Patient was found to have a left-sided subarachnoid hemorrhage, on aspirin and Plavix at home. Fall was around 31 75 62, on initial presentation to [facility] patient was hypertensive and given 20 mg of labetalol. She was transferred to our facility for higher level of care. Patient is Mongolian speaking only and a bedside  was used. Initially on evaluation patient stated that she was amnestic to events, does not remember hitting her head or loss of consciousness. Trauma team at bedside, patient was a trauma priority. Received trauma pan scans. Imaging shows single small focus of subarachnoid hemorrhage in the left parietal region measuring 1.9 cm. No other acute injuries were identified. On reevaluation patient denies any loss of consciousness, slipped and was unable to catch herself. Denies any pain. Patients son at bedside     PAST MEDICAL HISTORY :       Past Medical History:    No past medical history on file. Past Surgical History:    No past surgical history on file. Social History:   Social History     Socioeconomic History    Marital status:       Spouse name: Not on file    Number of children: Not on file    Years of education: Not on file    Highest education level: Not on file   Occupational History    Not on file   Tobacco Use    Smoking status: Not on file    Smokeless tobacco: Not on file   Substance and Sexual Activity    Alcohol use: Not on file    Drug use: Not on file    Sexual activity: Not on file   Other Topics Concern    Not on file   Social History Narrative    Not on file     Social Determinants of Health     Financial Resource Strain: Not on file   Food Insecurity: Not on file   Transportation Needs: Not on file   Physical Activity: Not on file   Stress: Not on file   Social Connections: Not on file   Intimate Partner Violence: Not on file   Housing Stability: Not on file       Family History:   No family history on file. Allergies:  Patient has no known allergies. Home Medications:  Prior to Admission medications    Medication Sig Start Date End Date Taking?  Authorizing Provider   alendronate (FOSAMAX) 70 MG tablet Take 70 mg by mouth once a week 7/8/20  Yes Historical Provider, MD   mirabegron (MYRBETRIQ) 25 MG TB24 Take 1 tablet by mouth daily 10/28/20  Yes Historical Provider, MD   SITagliptin (JANUVIA) 100 MG tablet Take 100 mg by mouth daily 9/8/20  Yes Historical Provider, MD   aspirin 81 MG EC tablet Take 81 mg by mouth daily    Historical Provider, MD   atorvastatin (LIPITOR) 40 MG tablet Take 40 mg by mouth daily 11/9/22   Historical Provider, MD   busPIRone (BUSPAR) 5 MG tablet Take 5 mg by mouth 2 times daily 1/24/23   Historical Provider, MD   carbidopa-levodopa (SINEMET CR)  MG per extended release tablet Take  tablets by mouth 2 times daily 1/14/23   Historical Provider, MD   clopidogrel (PLAVIX) 75 MG tablet Take 75 mg by mouth daily 1/24/23   Historical Provider, MD   docusate sodium (COLACE) 100 MG capsule Take 100 mg by mouth daily 1/30/23   Historical Provider, MD   ezetimibe (ZETIA) 10 MG tablet Take 10 mg by mouth daily 12/30/22   Historical Provider, MD   furosemide (LASIX) 20 MG tablet Take 20 mg by mouth three times a week 1/20/23   Historical Provider, MD   glipiZIDE (GLUCOTROL) 10 MG tablet Take 10 mg by mouth 2 times daily 1/24/23   Historical Provider, MD   levothyroxine (SYNTHROID) 100 MCG tablet Take 100 mcg by mouth every morning (before breakfast) 1/24/23   Historical Provider, MD   metFORMIN (GLUCOPHAGE) 850 MG tablet Take 850 mg by mouth 2 times daily (with meals) 12/26/22   Historical Provider, MD   metoprolol tartrate (LOPRESSOR) 25 MG tablet Take 25 mg by mouth 2 times daily 11/29/22   Historical Provider, MD   rivastigmine (EXELON) 3 MG capsule Take 3 mg by mouth 2 times daily 11/17/22   Historical Provider, MD   sertraline (ZOLOFT) 50 MG tablet Take 50 mg by mouth daily 2/3/23   Historical Provider, MD   traMADol (ULTRAM) 50 MG tablet Take 50 mg by mouth 2 times daily as needed.  1/17/23   Historical Provider, MD   valsartan (DIOVAN) 80 MG tablet Take 80 mg by mouth daily 1/4/23   Historical Provider, MD       Current Medications:   Current Facility-Administered Medications: sodium chloride flush 0.9 % injection 5-40 mL, 5-40 mL, IntraVENous, 2 times per day  sodium chloride flush 0.9 % injection 5-40 mL, 5-40 mL, IntraVENous, PRN  0.9 % sodium chloride infusion, , IntraVENous, PRN  ondansetron (ZOFRAN-ODT) disintegrating tablet 4 mg, 4 mg, Oral, Q8H PRN **OR** ondansetron (ZOFRAN) injection 4 mg, 4 mg, IntraVENous, Q6H PRN  oxyCODONE (ROXICODONE) immediate release tablet 5 mg, 5 mg, Oral, Q6H PRN  methocarbamol (ROBAXIN) tablet 750 mg, 750 mg, Oral, Q6H  gabapentin (NEURONTIN) capsule 300 mg, 300 mg, Oral, q8h  acetaminophen (TYLENOL) tablet 1,000 mg, 1,000 mg, Oral, q8h  0.9 % sodium chloride infusion, , IntraVENous, Continuous  glucose chewable tablet 16 g, 4 tablet, Oral, PRN  dextrose bolus 10% 125 mL, 125 mL, IntraVENous, PRN **OR** dextrose bolus 10% 250 mL, 250 mL, IntraVENous, PRN  glucagon (rDNA) injection 1 mg, 1 mg, SubCUTAneous, PRN  dextrose 10 % infusion, , IntraVENous, Continuous PRN  insulin lispro (HUMALOG) injection vial 0-16 Units, 0-16 Units, SubCUTAneous, TID WC  insulin lispro (HUMALOG) injection vial 0-4 Units, 0-4 Units, SubCUTAneous, Nightly  metoprolol (LOPRESSOR) injection 5 mg, 5 mg, IntraVENous, Q6H    REVIEW OF SYSTEMS:       CONSTITUTIONAL: negative for fatigue and malaise   EYES: negative for double vision and photophobia    HEENT: negative for tinnitus and sore throat   RESPIRATORY: negative for cough, shortness of breath   CARDIOVASCULAR: negative for chest pain, palpitations   GASTROINTESTINAL: negative for nausea, vomiting   GENITOURINARY: negative for incontinence   MUSCULOSKELETAL: Positive for neck pain    NEUROLOGICAL: negative for seizures   PSYCHIATRIC: negative for agitated     Review of systems otherwise negative. PHYSICAL EXAM:       /88   Pulse 74   Temp 98.6 °F (37 °C) (Oral)   Resp 21   SpO2 93%       CONSTITUTIONAL:  Well developed, well nourished, alert and oriented x 3, in no acute distress. GCS 15, nontoxic. No dysarthria, no aphasia. EOMI.     HEAD:  normocephalic   EYES:  PERRLA, EOMI.   ENT:  moist mucous membranes   NECK:  supple, symmetric, no midline tenderness to palpation    BACK:  without midline tenderness, step-offs or deformities    LUNGS:  Equal air entry bilaterally   CARDIOVASCULAR:  normal s1 / s2   ABDOMEN:  Soft, no rigidity   NEUROLOGIC:  EYE OPENING     Spontaneous - 4 [x]       To voice - 3 []       To pain - 2 []       None - 1 []    VERBAL RESPONSE     Appropriate, oriented - 5 [x]       Dazed or confused - 4 []       Syllables, expletives - 3 []       Grunts - 2 []       None - 1 []    MOTOR RESPONSE     Spontaneous, command - 6 [x]       Localizes pain - 5 []       Withdraws pain - 4 []       Abnormal flexion - 3 []       Abnormal extension - 2 []       None - 1 []            Total GCS: 15    Mental Status:  A & O x3, awake              Motor Exam:    Drift:  absent  Tone:  normal    Motor exam is symmetrical 5 out of 5 all extremities bilaterally    Sensory:    Touch:    Right Upper Extremity:  normal  Left Upper Extremity:  normal  Right Lower Extremity:  normal  Left Lower Extremity:  normal    Plantar Response:    Right:  equivocal  Left:  equivocal    Clonus: absent       SKIN:  no rash      LABS AND IMAGING:     CBC with Differential:    Lab Results   Component Value Date/Time    WBC 10.1 02/03/2023 10:27 PM    RBC 4.56 02/03/2023 10:27 PM    HGB 12.8 02/03/2023 10:27 PM    HCT 40.0 02/03/2023 10:27 PM     02/03/2023 10:27 PM    MCV 87.7 02/03/2023 10:27 PM    MCH 28.1 02/03/2023 10:27 PM    MCHC 32.0 02/03/2023 10:27 PM    RDW 15.2 02/03/2023 10:27 PM    LYMPHOPCT 13 09/06/2022 10:13 PM    MONOPCT 8 09/06/2022 10:13 PM    BASOPCT 2 09/06/2022 10:13 PM    MONOSABS 0.70 09/06/2022 10:13 PM    LYMPHSABS 1.30 09/06/2022 10:13 PM    EOSABS 0.40 09/06/2022 10:13 PM    BASOSABS 0.20 09/06/2022 10:13 PM     BMP:    Lab Results   Component Value Date/Time     02/03/2023 10:27 PM    K 4.7 02/03/2023 10:27 PM     02/03/2023 10:27 PM    CO2 24 02/03/2023 10:27 PM    BUN 19 02/03/2023 10:27 PM    LABALBU 4.1 09/06/2022 10:13 PM    CREATININE 0.83 02/03/2023 10:27 PM    CALCIUM 9.8 09/06/2022 10:13 PM    GFRAA >60 09/06/2022 10:13 PM    LABGLOM >60 02/03/2023 10:27 PM    GLUCOSE 291 02/03/2023 10:27 PM       Radiology Review:    CT HEAD WO CONTRAST   Final Result   Single small focus of subarachnoid hemorrhage in the left parietal region   measuring 1.9 cm. CT CHEST ABDOMEN PELVIS W CONTRAST Additional Contrast? None   Preliminary Result   1. No acute traumatic injury in the aorta, chest, abdomen or pelvis. 2. No acute fracture in the thoracic or lumbar spine. 3. Incidental right middle lobe 9 mm nodule. Follow-up chest CT suggested in   3 months. RECOMMENDATIONS:   9 mm right solid pulmonary nodule. Consider a non-contrast Chest CT at 3   months, a PET/CT, or tissue sampling. These guidelines do not apply to immunocompromised patients and patients with   cancer. Follow up in patients with significant comorbidities as clinically   warranted. For lung cancer screening, adhere to Lung-RADS guidelines. Reference: Radiology. 2017; 284(1):228-43. CT LUMBAR SPINE TRAUMA RECONSTRUCTION   Preliminary Result   1. No acute traumatic injury in the aorta, chest, abdomen or pelvis. 2. No acute fracture in the thoracic or lumbar spine. 3. Incidental right middle lobe 9 mm nodule. Follow-up chest CT suggested in   3 months. RECOMMENDATIONS:   9 mm right solid pulmonary nodule. Consider a non-contrast Chest CT at 3   months, a PET/CT, or tissue sampling. These guidelines do not apply to immunocompromised patients and patients with   cancer. Follow up in patients with significant comorbidities as clinically   warranted. For lung cancer screening, adhere to Lung-RADS guidelines. Reference: Radiology. 2017; 284(1):228-43. CT THORACIC SPINE TRAUMA RECONSTRUCTION   Preliminary Result   1. No acute traumatic injury in the aorta, chest, abdomen or pelvis. 2. No acute fracture in the thoracic or lumbar spine. 3. Incidental right middle lobe 9 mm nodule. Follow-up chest CT suggested in   3 months. RECOMMENDATIONS:   9 mm right solid pulmonary nodule. Consider a non-contrast Chest CT at 3   months, a PET/CT, or tissue sampling. These guidelines do not apply to immunocompromised patients and patients with   cancer. Follow up in patients with significant comorbidities as clinically   warranted. For lung cancer screening, adhere to Lung-RADS guidelines. Reference: Radiology. 2017; 284(1):228-09. CT HEAD WO CONTRAST    (Results Pending)           ASSESSMENT AND PLAN:       Patient Active Problem List   Diagnosis    Intracranial hemorrhage (HCC)         A/P:  This is a 81 y.o. female with fall from standing height. Was found to have a single small focus of subarachnoid hemorrhage in the left parietal region measuring 1.9 cm. On aspirin and Plavix at home. No neurologic deficits at this time.   Patient care will be discussed with attending, will reevaluate patient along with attending     - No neurosurgical interventions planned for now  - CTLS recommendations: Keep c-collar in place, will clear C-spine in the a.m.  - HOB: 30 degrees   - Obtain CT head for stability 6 hours after initial   - Neuro checks per protocol  - Hold all antiplatelets and anticoagulants        Additional recommendations may follow    Please contact neurosurgery with any changes in patients neurologic status. Thank you for your consult.        Rebel Mendoza MD   NS pager [phone]  2/4/2023  12:19 AM

## 2023-02-04 NOTE — PROGRESS NOTES
Dr. Alejandro Hernandes at bedside to update son and daughter-in-law. Family and patient questions answered. Son will bring home medications list back with him when he returns.

## 2023-02-04 NOTE — PROGRESS NOTES
[address] [facility]     Emergency/Trauma Note    PATIENT NAME: Serge Hollins    Shift date: 2/3/23  Shift day: Friday   Shift # 2    Room # 1072/3271-43   Name: Serge Hollins            Age: 81 y.o. Gender: female          Confucianism: Non-Catholic   Place of Latter-day:       Trauma/Incident type: Adult Trauma Priority  Admit Date & Time: 2/3/2023 10:03 PM  TRAUMA NAME: N/A    ADVANCE DIRECTIVES IN CHART? No    NAME OF DECISION MAKER: N/A    RELATIONSHIP OF DECISION MAKER TO PATIENT: N/A    PATIENT/EVENT DESCRIPTION:  Serge Hollins is a 81 y.o. female who arrived via transfer from [place] [address] as a Trauma Priority. Patient was brought to Trauma A. Pt to be admitted to [facility]/1028-01. SPIRITUAL ASSESSMENT-INTERVENTION-OUTCOME:  Briana Knight was a ministry of presence to patient and medical staff.  spoke with EMS regarding family.  was informed patient's son was on way to campus. Patient appeared coping and anxious. EMS was speaking Irish to patient who apparently needed a .  met with patient's son in ED lobby.  spoke with RN who gave approval for family connection.  escorted son back to ED 20. PATIENT BELONGINGS:  With Patient. ANY BELONGINGS OF SIGNIFICANT VALUE NOTED:  N/A    REGISTRATION STAFF NOTIFIED? Yes      WHAT IS YOUR SPIRITUAL CARE PLAN FOR THIS PATIENT?:   Chaplains will remain available to offer spiritual and emotional support as needed.     Electronically signed by Agustin Nash on 2/4/2023 at 12:15 AM.  [facility]  [phone]

## 2023-02-04 NOTE — PROGRESS NOTES
[facility]  Speech Language Pathology    Date: 2/4/2023  Patient Name: Jadyn Scherer  YOB: 1935   AGE: 81 y.o. MRN: [id number]        Patient Not Available for Speech Therapy     Due to:  [] Testing  [] Hemodialysis  [] Cancelled by RN  [] Surgery   [] Intubation/Sedation/Pain Medication  [] Medical instability  [x] Other: busy with Physical Therapy    Per RN, family reports pt had baseline dementia and is hard of hearing. They deny any changes in cognition from her baseline. RN reports pt moving rooms. Next scheduled treatment: 2/4/2023  Completed by: Jonn Guerrero, SLP, M. Ed.  VERENA-SLP

## 2023-02-04 NOTE — ED PROVIDER NOTES
[facility] [facility]     Emergency Department     Faculty Note/ Attestation      Pt Name: Rajni Garcia                                       MRN: [id number]  [place] 1935  Date of evaluation: 2/3/2023    Patients PCP:    Blanche Recio MD      Attestation  I performed a history and physical examination of the patient and discussed management with the resident. I reviewed the residents note and agree with the documented findings and plan of care. Any areas of disagreement are noted on the chart. I was personally present for the key portions of any procedures. I have documented in the chart those procedures where I was not present during the key portions. I have reviewed the emergency nurses triage note. I agree with the chief complaint, past medical history, past surgical history, allergies, medications, social and family history as documented unless otherwise noted below. For Physician Assistant/ Nurse Practitioner cases/documentation I have personally evaluated this patient and have completed at least one if not all key elements of the E/M (history, physical exam, and MDM). Additional findings are as noted.       Initial Screens:        [place] Coma Scale  Eye Opening: Spontaneous  Best Verbal Response: Oriented  Best Motor Response: Obeys commands  [place] Coma Scale Score: 15    Vitals:    Vitals:    02/03/23 2213 02/03/23 2213 02/03/23 2214   BP:   (!) 156/94   Pulse:  72    Resp:  20    Temp: 98.6 °F (37 °C)     TempSrc: Oral     SpO2:  91%        CHIEF COMPLAINT       Chief Complaint   Patient presents with    Fall             DIAGNOSTIC RESULTS             RADIOLOGY:   CT CHEST ABDOMEN PELVIS W CONTRAST Additional Contrast? None    (Results Pending)   CT LUMBAR SPINE TRAUMA RECONSTRUCTION    (Results Pending)   CT THORACIC SPINE TRAUMA RECONSTRUCTION    (Results Pending)   CT HEAD WO CONTRAST    (Results Pending)         LABS:  Labs Reviewed - No data to display      EMERGENCY DEPARTMENT COURSE:     -------------------------  BP: (!) 156/94, Temp: 98.6 °F (37 °C), Heart Rate: 72, Resp: 20      Comments    Transfer Report:  80 yo Female  [place] fall, face first  No LOC  Witnessed  SAH  On plavix  Face and neck CT neg  VSS    ED Note:  Patient arrives sitting up on the EMS stretcher, she has bruising to the forehead, nose, face. She is Mongolian speaking only, native Mongolian speaking medical student able to provide immediate translation until iPad available. Patient does complain of pain to her face as well as pain to her neck. Patient placed in a soft c-collar, primary secondary survey conducted by trauma and emergency medicine team, full interview and explanation of plan of care to be conducted by residents with Mongolian speaking  via iPad.       (Please note that portions of this note were completed with a voice recognition program.  Efforts were made to edit the dictations but occasionally words are mis-transcribed.)      Richar Austin MD,, MD  Attending Emergency Physician         Richar Austin MD  02/04/23 0258

## 2023-02-04 NOTE — ED NOTES
Pt presents to the ER via EMS to trauma bay. Pt is tx from [facility] ER after witnessed fall at home at approximately 1830. Pt is Danish speaking only. Pt has SAH according to scans at [facility]. Pt is on Plavix. Pt c/o right shoulder pain and neck pain. Pt arrives without c-collar. Pt received 20mg labetalol d/t HTN. Dr. Xander Singh placed c-collar on pt on arrival. Trauma team at bedside to further evaluate patient.           Merline Webb RN  02/04/23 0021

## 2023-02-04 NOTE — H&P
TRAUMA H&P/CONSULT    PATIENT NAME: Camryn Pat  YOB: 1935  MEDICAL RECORD NO. [id number]   DATE: 2/3/2023  PRIMARY CARE PHYSICIAN: Sudhakar Pena MD  PATIENT EVALUATED AT THE REQUEST OF : Campos Lucero    ACTIVATION   []Trauma Alert     [x] Trauma Priority     []Trauma Consult. Patient Active Problem List   Diagnosis    Intracranial hemorrhage ([address]       IMPRESSION AND PLAN:       Diagnosis: Left parietal SAH   -Admission to TICU, BIG 3   -Neurosurgery consultation   -Repeat CT head in 6-hours   -Regular neurochecks    Diagnosis: Cervical spine tenderness   -CT C-spine negative for osseous abnormality at outlying facility   -Maintain c-collar, neurosurgery clear in a.m. If intracranial hemorrhage is present, is it a:  [] BIG 1  [] BIG 2  [x] BIG 3  If chest wall injury: Rib score___    CONSULT SERVICES    [x] Neurosurgery     [] Orthopedic Surgery    [] Cardiothoracic     [] Facial Trauma    [] Plastic Surgery (Burn)    [] Pediatric Surgery     [] Internal Medicine    [] Pulmonary Medicine    [] Geriatrics    [] Other:        HISTORY:     Chief Complaint:  \"Fall from standing\"    GENERAL DATA  Patient information was obtained from patient and EMS personnel. History/Exam limitations: communication barrier Language.   Injury Date: 2/3/23   Approximate Injury Time: unkown        Transport mode:   [x]Ambulance      [] Helicopter     []Car       [] Other  Referring Hospital: [address] [place] [address] Road   Location (e.g., home, farm, industry, street): home  Specific Details of Location (e.g., bedroom, kitchen, garage, highway): home    MECHANISM OF INJURY         [x] Fall    [x]From Standing     []From Height __ Ft     []Down ___steps  []Other___      [] Eye protection  []Boots   []Flotation device   []Leather outerwear  []Sports gear []Other:___       HISTORY:     Camryn Pat is a 80 y/o Yoruba speaking female that presented to the Emergency Department via EMS after suffering a fall from standing. Patient is a native Chinese speaker and history was obtained using the help of a native Chinese speaking medical student until [facility] speaking  available via iPad. Patient states that she fell face first from standing height and was unable to catch her self. Denies loss of consciousness. Patient takes aspirin and Plavix. Was initially taken to [facility].  CT of the head performed at [place] [address] revealed small left parietal subarachnoid hemorrhage. Patient was then subsequently transferred to [facility] for further evaluation and treatment. Upon presentation to [facility], patient is awake and alert, GCS 15, able to move all 4 extremities. Complaining of cervical spine pain, however CT C-spine performed at [facility] was negative for any acute osseous abnormality. Traumatic loss of Consciousness [x]No   []Yes Duration(min)       [] Unknown     Total Fluids Given Prior To Arrival  mL    MEDICATIONS:   []  None     []  Information not available due to exam limitations documented above    Prior to Admission medications    Not on File       ALLERGIES:   []  None    []   Information not available due to exam limitations documented above     Patient has no known allergies. PAST MEDICAL/SURGICAL HISTORY: []  None   []   Information not available due to exam limitations documented above      has no past medical history on file. has no past surgical history on file. FAMILY HISTORY   []   Information not available due to exam limitations documented above    family history is not on file. SOCIAL HISTORY  []   Information not available due to exam limitations documented above     has no history on file for tobacco use.   has no history on file for alcohol use.   has no history on file for drug use. Review of Systems:    Review of Systems   Constitutional:  Negative for chills and fever. HENT: Negative. Eyes:  Negative for visual disturbance.    Respiratory: Negative for chest tightness, shortness of breath, wheezing and stridor. Cardiovascular:  Negative for chest pain. Gastrointestinal:  Negative for abdominal pain, diarrhea, nausea and vomiting. Endocrine: Negative. Genitourinary: Negative. Musculoskeletal:  Positive for neck pain. Negative for arthralgias, joint swelling and myalgias. Skin:  Negative for pallor and wound. Neurological:  Negative for dizziness, facial asymmetry, weakness, numbness and headaches. PHYSICAL EXAMINATION:     VITAL SIGNS:   Vitals:    02/03/23 2243   BP: 119/88   Pulse: 74   Resp: 21   Temp:    SpO2: 93%       Physical Exam  Vitals and nursing note reviewed. Constitutional:       Appearance: Normal appearance. HENT:      Head: Normocephalic. Comments: Ecchymosis present along nasal bridge and right cheek     Right Ear: Tympanic membrane normal.      Left Ear: Tympanic membrane normal.      Nose: Nose normal.      Mouth/Throat:      Mouth: Mucous membranes are moist.      Pharynx: Oropharynx is clear. Eyes:      Extraocular Movements: Extraocular movements intact. Conjunctiva/sclera: Conjunctivae normal.      Pupils: Pupils are equal, round, and reactive to light. Neck:      Comments: C-collar in place  Cardiovascular:      Rate and Rhythm: Normal rate and regular rhythm. Pulses: Normal pulses. Pulmonary:      Effort: Pulmonary effort is normal. No respiratory distress. Breath sounds: Normal breath sounds. No stridor. No wheezing. Abdominal:      General: Bowel sounds are normal. There is no distension. Palpations: Abdomen is soft. Tenderness: There is no abdominal tenderness. There is no guarding or rebound. Musculoskeletal:         General: No swelling, deformity or signs of injury. Normal range of motion. Cervical back: Normal range of motion. Tenderness present. Skin:     General: Skin is warm and dry.       Capillary Refill: Capillary refill takes less than 2 seconds. Findings: No bruising. Neurological:      General: No focal deficit present. Mental Status: She is alert and oriented to person, place, and time. Motor: No weakness. Psychiatric:         Mood and Affect: Mood normal.         Behavior: Behavior normal.        FOCUSED ABDOMINAL SONOGRAM FOR TRAUMA (FAST): A good  quality examination was performed by Dr. Josee Jane and representative images were obtained. [x] No free fluid in the abdomen   [] Free fluid in RUQ   [] Free fluid in LUQ  [] Free fluid in Pelvis  [] Pericardial fluid  [] Other:        RADIOLOGY  CT HEAD WO CONTRAST    Result Date: 2/3/2023  Single small focus of subarachnoid hemorrhage in the left parietal region measuring 1.9 cm. CT CHEST ABDOMEN PELVIS W CONTRAST Additional Contrast? None    Result Date: 2/3/2023  1. No acute traumatic injury in the aorta, chest, abdomen or pelvis. 2. No acute fracture in the thoracic or lumbar spine. 3. Incidental right middle lobe 9 mm nodule. Follow-up chest CT suggested in 3 months. RECOMMENDATIONS: 9 mm right solid pulmonary nodule. Consider a non-contrast Chest CT at 3 months, a PET/CT, or tissue sampling. These guidelines do not apply to immunocompromised patients and patients with cancer. Follow up in patients with significant comorbidities as clinically warranted. For lung cancer screening, adhere to Lung-RADS guidelines. Reference: Radiology. 2017; 284(1):228-43. CT LUMBAR SPINE TRAUMA RECONSTRUCTION    Result Date: 2/3/2023  1. No acute traumatic injury in the aorta, chest, abdomen or pelvis. 2. No acute fracture in the thoracic or lumbar spine. 3. Incidental right middle lobe 9 mm nodule. Follow-up chest CT suggested in 3 months. RECOMMENDATIONS: 9 mm right solid pulmonary nodule. Consider a non-contrast Chest CT at 3 months, a PET/CT, or tissue sampling. These guidelines do not apply to immunocompromised patients and patients with cancer.  Follow up in patients with significant comorbidities as clinically warranted. For lung cancer screening, adhere to Lung-RADS guidelines. Reference: Radiology. 2017; 284(1):228-43. CT THORACIC SPINE TRAUMA RECONSTRUCTION    Result Date: 2/3/2023  1. No acute traumatic injury in the aorta, chest, abdomen or pelvis. 2. No acute fracture in the thoracic or lumbar spine. 3. Incidental right middle lobe 9 mm nodule. Follow-up chest CT suggested in 3 months. RECOMMENDATIONS: 9 mm right solid pulmonary nodule. Consider a non-contrast Chest CT at 3 months, a PET/CT, or tissue sampling. These guidelines do not apply to immunocompromised patients and patients with cancer. Follow up in patients with significant comorbidities as clinically warranted. For lung cancer screening, adhere to Lung-RADS guidelines. Reference: Radiology. 2017; 284(1):228-43.          LABS  Labs Reviewed   TRAUMA PANEL - Abnormal; Notable for the following components:       Result Value    RDW 15.2 (*)     HCO3, Venous 23.1 (*)     All other components within normal limits   URINALYSIS   BASIC METABOLIC PANEL W/ REFLEX TO MG FOR LOW K   CBC WITH AUTO DIFFERENTIAL   TYPE AND SCREEN         Odessa Cespedes DO  2/3/23, 10:48 PM

## 2023-02-04 NOTE — PROGRESS NOTES
Physical Therapy  Facility/Department: [address] BURN UNIT  Physical Therapy Initial Assessment    Name: Kenneth Anthony  : 1935  MRN: [id number]  Date of Service: 2023    Discharge Recommendations: Further therapy recommended at discharge. Chief Complaint   Patient presents with    Liu Wilson is a 82 y/o Chinese speaking female that presented to the Emergency Department via EMS after suffering a fall from standing. Patient is a native Chinese speaker and history was obtained using the help of a native Chinese speaking medical student until [place] [address] speaking  available via iPad. Patient states that she fell face first from standing height and was unable to catch her self. Denies loss of consciousness. Patient takes aspirin and Plavix. Was initially taken to [facility].  CT of the head performed at [place] [address] revealed small left parietal subarachnoid hemorrhage. Patient was then subsequently transferred to [facility] for further evaluation and treatment. Upon presentation to [facility], patient is awake and alert, GCS 15, able to move all 4 extremities. Complaining of cervical spine pain, however CT C-spine performed at [facility] was negative for any acute osseous abnormality. PT Equipment Recommendations  Equipment Needed: Yes  Mobility Devices: Nai Nimesh: Rolling      Patient Diagnosis(es): The encounter diagnosis was Subarachnoid bleed ([address] Past Medical History:  has no past medical history on file. Past Surgical History:  has no past surgical history on file. Assessment   Body Structures, Functions, Activity Limitations Requiring Skilled Therapeutic Intervention: Decreased functional mobility ; Decreased ADL status; Decreased body mechanics; Decreased endurance;Decreased strength;Decreased coordination;Decreased balance;Decreased high-level IADLs  Assessment: Pt ambulates 25 ft with RW and CGA. pt mod A for bed mobility.  pt is a high fall risk d/t decreased balance and endurance, benefitting from 24 hr support for functional mobility at this time. Pt would benefit from continued therapy to promote endurance, balance, and strengthening. Therapy Prognosis: Good  Decision Making: Medium Complexity  Barriers to Learning: none  Requires PT Follow-Up: Yes  Activity Tolerance  Activity Tolerance: Patient limited by endurance; Patient limited by fatigue     Plan   Physcial Therapy Plan  General Plan:  (5-6x)  Current Treatment Recommendations: Strengthening, Balance training, Functional mobility training, Transfer training, ADL/Self-care training, IADL training, Stair training, Gait training, Endurance training, Neuromuscular re-education, Equipment evaluation, education, & procurement, Therapeutic activities, Home exercise program, Safety education & training, Patient/Caregiver education & training  Safety Devices  Type of Devices: All fall risk precautions in place, Call light within reach, Gait belt, Nurse notified, Left in chair (RN present upon PT exit)     Restrictions  Restrictions/Precautions  Restrictions/Precautions: Fall Risk  Required Braces or Orthoses?: No  Position Activity Restriction  Other position/activity restrictions: c-spine cleared per Rosalva Hall MD, clarified via perfect serve, Luxembourgish interpretor needed. Subjective   General  Patient assessed for rehabilitation services?: Yes  Response To Previous Treatment: Not applicable  Family / Caregiver Present: No  Follows Commands: Within Functional Limits  Subjective  Subjective: RN and pt agreeable to PT. pt agreeable and pleasant. pt supine in bed at start of session, c/o no pain. Interpretor  [id number] used.          Social/Functional History  Social/Functional History  Lives With: Family (Son and daughter in law)  Type of Home: House  Home Layout: One level  Home Access: Stairs to enter with rails  Entrance Stairs - Number of Steps: 3  Entrance Stairs - Rails: Both  Bathroom Toilet: Standard  Bathroom Equipment: Grab bars in shower, Grab bars around toilet  Home Equipment: rand Meyers (pt uses QC for mobility)  Receives Help From: Family  ADL Assistance: Independent  Homemaking Assistance: Independent  Homemaking Responsibilities: Yes  Ambulation Assistance: Independent  Transfer Assistance: Independent  Active : No  Occupation: Retired  Additional Comments: Pt reports son works days, can assist PRN. Daughter in law can assist 24 hrs of the day. Pt is questionable historian, information per pt via . Vision/Hearing  Vision  Vision: Within Functional Limits  Hearing  Hearing: Exceptions to [facility]  Hearing Exceptions: Hard of hearing/hearing concerns; No hearing aid    Cognition   Orientation  Overall Orientation Status: Impaired  Orientation Level: Oriented to person;Oriented to place; Disoriented to time;Disoriented to situation (Pt knew her full name, but was unable to state her birthday, pt knew she was in [facility], but unable to state she was in hospital)  Cognition  Overall Cognitive Status: Exceptions  Following Commands: Follows one step commands with repetition; Follows one step commands with increased time  Safety Judgement: Decreased awareness of need for assistance;Decreased awareness of need for safety  Problem Solving: Assistance required to generate solutions;Assistance required to identify errors made;Decreased awareness of errors  Insights: Decreased awareness of deficits  Initiation: Requires cues for some  Sequencing: Requires cues for some           Gross Assessment  Sensation: Intact (pt denies n/t)     AROM RLE (degrees)  RLE AROM: WFL  AROM LLE (degrees)  LLE AROM : WFL  AROM RUE (degrees)  RUE AROM : WFL  AROM LUE (degrees)  LUE AROM : WFL  Strength RLE  Strength RLE: WFL  Strength LLE  Strength LLE: WFL  Strength RUE  Strength RUE: Exception  Comment: not formally tested, see OT note  Strength LUE  Strength LUE: Exception  Comment: not formally tested, see OT note           Bed mobility  Supine to Sit: Moderate assistance (for trunk progression)  Sit to Supine:  (pt ends in recliner)  Scooting: Contact guard assistance  Bed Mobility Comments: HOB elevated. Increased time and effort to complete. Transfers  Sit to Stand: Contact guard assistance  Stand to Sit: Contact guard assistance  Comment: RW for UE support  Ambulation  Surface: Level tile  Device: Rolling Walker  Assistance: Contact guard assistance  Gait Deviations: Slow Fawn;Decreased step length;Decreased step height  Distance: 25 ft  Comments: Pt with mildly flexed posturing. No LOB noted.   More Ambulation?: No  Stairs/Curb  Stairs?: No     Balance  Posture: Good  Sitting - Static: Good  Sitting - Dynamic: Good;-  Standing - Static: Fair  Standing - Dynamic: Fair  Comments: Assessed with RW                                                     AM-PAC Score  AM-PAC Inpatient Mobility Raw Score : 16 (02/04/23 1538)  AM-PAC Inpatient T-Scale Score : 40.78 (02/04/23 1538)  Mobility Inpatient CMS 0-100% Score: 54.16 (02/04/23 1538)  Mobility Inpatient CMS G-Code Modifier : CK (02/04/23 1538)             Goals  Short Term Goals  Time Frame for Short Term Goals: 14 visits  Short Term Goal 1: Complete transfers with mod I and RW  Short Term Goal 2: Complete 300 ft of gait with mod I and RW  Short Term Goal 3: Complete 3 steps with one HR and mod I  Short Term Goal 4: Participate in 30 minutes of therapy to promote endurance       Education  Patient Education  Education Given To: Patient  Education Provided: Role of Therapy;Plan of Care  Education Method: Demonstration;Verbal  Barriers to Learning: None  Education Outcome: Verbalized understanding;Demonstrated understanding      Therapy Time   Individual Concurrent Group Co-treatment   Time In 1304         Time Out 1341         Minutes 37         Timed Code Treatment Minutes: [facility], PT

## 2023-02-04 NOTE — DISCHARGE INSTRUCTIONS
Discharge Instructions for Trauma       What to do after you leave the hospital:    DO NO TAKE PLAVIX UNTIL SEEN BY THE NEUROSURGERY TEAM.  Call the neurosurgery clinic, Dr. Maximiliano Santo, and make an appointment in 1 week for follow up and repeat head CT    You sustained a head injury during your recent traumatic event. Please refrain from any activities that could put you at risk for further injury to your head as you heal over the next 3-4 weeks (such as ladders, contact sports, 4-daley/ATV activities, etc.) You received a cognitive evaluation while hospitalized-follow all instructions given by the speech-language pathologist.   For additional support and resources for you and your family contact the Traumatic Brain Injury [facility] at [phone]. This center offers additional therapy, support groups, education and other resources at no cost. The center is located at [address] [facility]. [address] You can also visit [email or web address] for more information. General questions or concerns may be called to the trauma nurse line at [phone] and please leave a message. Trauma is a life-threatening condition. Your doctor will want to closely monitor you. Be sure to go to all of your appointments.

## 2023-02-04 NOTE — PROGRESS NOTES
ICU PROGRESS NOTE        PATIENT NAME: Clint Rosales RECORD NO. [id number]  DATE: 2023    PRIMARY CARE PHYSICIAN: Sarah Kirk MD    HD: # 1    ASSESSMENT    Patient Active Problem List   Diagnosis    Intracranial hemorrhage [facility])       MEDICAL DECISION MAKING AND PLAN  Neuro:  SAH left parietal region 1.9cm in size  Repeat head CT today at 6am stable - no increase in size  Neurosurgery following  Dementia - alert to person and place  CV  Stable BPs  Pulm  Room air  GI/Nutrition  Start gen diet today  Renal/lytes  BUN/Cr 16/0.74  Heme  DVT prophylaxis-hold due to [facility]  SCDs  7. Endocrine        1. 77  Musculoskeletal  normal  Skin  Bruising around right eye  Micro  none  Family/dispo  TICU - floor  PT/OT  Lines  PIV     CHECKLIST    CAM-ICU RASS: awake alert  RESTRAINTS: none  IVF: KVO  NUTRITION: gen diet  ANTIBIOTICS: none  GI: glycolax/pepcid  DVT: SCDs  GLYCEMIC CONTROL: stable  HOB >45: yes  MOBILITY: good/ can work with PTOT  SBT: n/a  IS: ordered    Chief Complaint: \"fall\"    SUBJECTIVE    Sean Danas  feeling well. No headache. No chest pain. No SOB. No complaints at this time. Used  - speaks Slovenian, patient hard of hearing. Son to come visit later.       OBJECTIVE  VITALS: Temp: Temp: 98.4 °F (36.9 °C)Temp  Av.5 °F (36.9 °C)  Min: 98.4 °F (36.9 °C)  Max: 98.6 °F (37 °C) BP Systolic (57LOF), BJY:363 , Min:91 , CHR:770   Diastolic (04ODV), ZSU:77, Min:52, Max:108   Pulse Pulse  Av.2  Min: 57  Max: 77 Resp Resp  Av.3  Min: 15  Max: 26 Pulse ox SpO2  Av.7 %  Min: 91 %  Max: 96 %    CONSTITUTIONAL: awake alert smiling  HEENT: symmetrical smile, EOMI, PERRLA   LUNGS: ctab  CV: good pulses 2+ throughout 4 ext  GI: soft nontender  MUSCULOSKELETAL: normal appearing 4 ext, able to move all 4 without deficit  NEUROLOGIC: AxOx2, difficulty hearing,   SKIN: warm dry, bruising to right eye (periorbital)        Drain/tube output:      LAB:  CBC:   Recent Labs 02/03/23 2227 02/04/23 0633   WBC 10.1 8.4   HGB 12.8 12.5   HCT 40.0 40.8   MCV 87.7 90.7    291     BMP:   Recent Labs     02/03/23 2227 02/04/23 0633    133*   K 4.7 4.2    100   CO2 24 21   BUN 19 16   CREATININE 0.83 0.74   GLUCOSE 291* 77         RADIOLOGY:  CT HEAD WO CONTRAST   Final Result   1. No significant change in the small amount of subarachnoid blood along the   left parietal lobe. No significant mass effect or midline shift. 2. No new area of intracranial hemorrhage seen. 3. Mild chronic microvascular ischemic changes. 4. Atherosclerosis of the intracranial vasculature. CT HEAD WO CONTRAST   Final Result   Single small focus of subarachnoid hemorrhage in the left parietal region   measuring 1.9 cm. CT CHEST ABDOMEN PELVIS W CONTRAST Additional Contrast? None   Preliminary Result   1. No acute traumatic injury in the aorta, chest, abdomen or pelvis. 2. No acute fracture in the thoracic or lumbar spine. 3. Incidental right middle lobe 9 mm nodule. Follow-up chest CT suggested in   3 months. RECOMMENDATIONS:   9 mm right solid pulmonary nodule. Consider a non-contrast Chest CT at 3   months, a PET/CT, or tissue sampling. These guidelines do not apply to immunocompromised patients and patients with   cancer. Follow up in patients with significant comorbidities as clinically   warranted. For lung cancer screening, adhere to Lung-RADS guidelines. Reference: Radiology. 2017; 284(1):228-43. CT LUMBAR SPINE TRAUMA RECONSTRUCTION   Preliminary Result   1. No acute traumatic injury in the aorta, chest, abdomen or pelvis. 2. No acute fracture in the thoracic or lumbar spine. 3. Incidental right middle lobe 9 mm nodule. Follow-up chest CT suggested in   3 months. RECOMMENDATIONS:   9 mm right solid pulmonary nodule. Consider a non-contrast Chest CT at 3   months, a PET/CT, or tissue sampling.    These guidelines do not apply to immunocompromised patients and patients with   cancer. Follow up in patients with significant comorbidities as clinically   warranted. For lung cancer screening, adhere to Lung-RADS guidelines. Reference: Radiology. 2017; 284(1):228-43. CT THORACIC SPINE TRAUMA RECONSTRUCTION   Preliminary Result   1. No acute traumatic injury in the aorta, chest, abdomen or pelvis. 2. No acute fracture in the thoracic or lumbar spine. 3. Incidental right middle lobe 9 mm nodule. Follow-up chest CT suggested in   3 months. RECOMMENDATIONS:   9 mm right solid pulmonary nodule. Consider a non-contrast Chest CT at 3   months, a PET/CT, or tissue sampling. These guidelines do not apply to immunocompromised patients and patients with   cancer. Follow up in patients with significant comorbidities as clinically   warranted. For lung cancer screening, adhere to Lung-RADS guidelines. Reference: Radiology. 2017; 284(1):228-43.                  Mardy Bumpers, MD  2/4/23, 9:02 AM

## 2023-02-04 NOTE — CARE COORDINATION
Attempted to complete SBIRT with IPad  Japanese interpretor, Ladarius. Both SW and interpretor cont to yell out pt's name and she would not awaken. Will attempt again later    1145 - Addendum  Met with son, dtr-in-law, and pt to attempt to complete SBIRT. Pt only stays awake for a brief moment. Son and dtr-in-law stated pt lives with them and pt does not drink alcohol or use any drugs. Son reports pt with hx of mild depression and is on medication, prescribed by her PCP. Pt denies any increase in symptoms the past 2 weeks and son stated he has not noticed any changes. Encouraged to keep PCP updated on how she is feeling. Could not fully complete the depression screen as pt could not stay awake            Alcohol Screening and Brief Intervention        Recent Labs     02/03/23 2227   ALC <10       Alcohol Pre-screening          Alcohol Screening Audit  Q1: How often do you have a drink containing alcohol?: Never  Q2: How many drinks containing alcohol do you have on a typical day when you are drinking?: Patient does not drink  Q3: How often do you have six or more drinks on one occasion?: Never  Audit-C Score: -1    Drug Pre-Screening   How many times in the past year have you used a recreational drug or used a prescription medication for nonmedical reasons?: None    Drug Screening DAST       Mood Pre-Screening (PHQ-2)          Mood Pre-Screening (PHQ-9)         I have interviewed Cresencio Peres, [id number] regarding  Her alcohol consumption/drug use and risk for excessive use. Screenings were negative. Patient  N/A intervention at this time.    Deferred []    Completed on: 2/4/2023   SYMONE Mcneil

## 2023-02-04 NOTE — ED PROVIDER NOTES
[facility] ED  Emergency Department Encounter  Emergency Medicine Resident     Pt Mohinder Platt  MRN: [id number]  [place] 1935  Date of evaluation: 2/3/23  PCP:  Mary Wright MD  Note Started: 10:28 PM EST      CHIEF COMPLAINT       Chief Complaint   Patient presents with    Fall     HISTORY OF PRESENT ILLNESS  (Location/Symptom, Timing/Onset, Context/Setting, Quality, Duration, Modifying Factors, Severity.)      Rojelio Rice is a 81 y.o. female who presents as a subarachnoid hemorrhage after sustaining a fall face first from standing height, was a transfer from [facility] for higher level of care. Patient is Kazakh speaking only, and history was taken through chart review and use of a bedside . Patient is amnestic to event, does not remember if she hit her head or lost consciousness. Patient takes aspirin and Plavix, no history of chemical AC. On history with [facility]  using the services, patient states that she did not lose consciousness today. States she just slipped and was unable to catch herself. Currently denying any pain. Has been taking all of her medication as instructed. Patient also speaks [place]. Son is at bedside as well. PAST MEDICAL / SURGICAL / SOCIAL / FAMILY HISTORY      has no past medical history on file. has no past surgical history on file. Social History     Socioeconomic History    Marital status:       Spouse name: Not on file    Number of children: Not on file    Years of education: Not on file    Highest education level: Not on file   Occupational History    Not on file   Tobacco Use    Smoking status: Not on file    Smokeless tobacco: Not on file   Substance and Sexual Activity    Alcohol use: Not on file    Drug use: Not on file    Sexual activity: Not on file   Other Topics Concern    Not on file   Social History Narrative    Not on file     Social Determinants of Health     Financial Resource Strain: Not on file   Food Insecurity: Not on file   Transportation Needs: Not on file   Physical Activity: Not on file   Stress: Not on file   Social Connections: Not on file   Intimate Partner Violence: Not on file   Housing Stability: Not on file       No family history on file. Allergies:  Patient has no known allergies. Home Medications:  Prior to Admission medications    Not on File       REVIEW OF SYSTEMS       Review of Systems   Constitutional:  Negative for chills and fever. HENT:  Negative for sore throat. Eyes:  Negative for visual disturbance. Respiratory:  Negative for cough and shortness of breath. Cardiovascular:  Negative for chest pain. Gastrointestinal:  Negative for abdominal pain and vomiting. Endocrine: Negative for polyuria. Genitourinary:  Negative for dysuria and hematuria. Musculoskeletal:  Positive for neck pain. Negative for back pain. Neurological:  Negative for light-headedness and headaches. Psychiatric/Behavioral:  Negative for confusion. PHYSICAL EXAM      INITIAL VITALS:   /88   Pulse 74   Temp 98.6 °F (37 °C) (Oral)   Resp 21   SpO2 93%     Physical Exam  Constitutional:       Appearance: Normal appearance. HENT:      Head: Normocephalic. Right Ear: Tympanic membrane normal.      Left Ear: Tympanic membrane normal.      Nose: Nose normal.      Mouth/Throat:      Mouth: Mucous membranes are moist.      Pharynx: Oropharynx is clear. Eyes:      Extraocular Movements: Extraocular movements intact. Pupils: Pupils are equal, round, and reactive to light. Neck:      Comments: Placed in aspen collar  Cardiovascular:      Rate and Rhythm: Normal rate and regular rhythm. Pulses: Normal pulses. Heart sounds: Normal heart sounds. Pulmonary:      Effort: Pulmonary effort is normal.      Breath sounds: Normal breath sounds. Abdominal:      Palpations: Abdomen is soft. Tenderness: There is no abdominal tenderness.  There is no right CVA tenderness or left CVA tenderness. Musculoskeletal:      Cervical back: Tenderness present. Right lower leg: No edema. Left lower leg: No edema. Skin:     General: Skin is warm. Capillary Refill: Capillary refill takes less than 2 seconds. Neurological:      General: No focal deficit present. Mental Status: She is alert and oriented to person, place, and time. Mental status is at baseline. DDX/DIAGNOSTIC RESULTS / EMERGENCY DEPARTMENT COURSE / MDM     Medical Decision Making  Subarachnoid hemorrhage, subdural, epidural, concussion, contusion, JOSE    81-year-old lady presents to the emergency department with a subarachnoid hemorrhage after falling face first.  Patient was a transfer from [facility].  On aspirin and Plavix. Occitan speaking only. Vital signs stable, physical exam grossly unremarkable. No obvious scalp injury. No focal neurological deficits. E fast negative. Neurosurgery at bedside. Patient to be admitted to trauma service for further management. Amount and/or Complexity of Data Reviewed  Labs: ordered. Risk  Decision regarding hospitalization. EMERGENCY DEPARTMENT COURSE:       PROCEDURES:  None    CONSULTS:  None    FINAL IMPRESSION      1. Subarachnoid bleed ([address]          DISPOSITION / PLAN     DISPOSITION Admitted 02/03/2023 10:42:06 PM      PATIENT REFERRED TO:  No follow-up provider specified.     DISCHARGE MEDICATIONS:  New Prescriptions    No medications on file       Wilton Purcell MD  Emergency Medicine Resident    (Please note that portions of thisnote were completed with a voice recognition program.  Efforts were made to edit the dictations but occasionally words are mis-transcribed.)        Wilton Purcell MD  Resident  02/03/23 5562

## 2023-02-05 ENCOUNTER — APPOINTMENT (OUTPATIENT)
Dept: GENERAL RADIOLOGY | Age: 88
DRG: 087 | End: 2023-02-05
Payer: COMMERCIAL

## 2023-02-05 VITALS
DIASTOLIC BLOOD PRESSURE: 79 MMHG | RESPIRATION RATE: 22 BRPM | OXYGEN SATURATION: 91 % | SYSTOLIC BLOOD PRESSURE: 137 MMHG | HEART RATE: 100 BPM | TEMPERATURE: 97.6 F

## 2023-02-05 LAB
ABSOLUTE EOS #: 0.37 K/UL (ref 0–0.44)
ABSOLUTE IMMATURE GRANULOCYTE: 0.04 K/UL (ref 0–0.3)
ABSOLUTE LYMPH #: 1.53 K/UL (ref 1.1–3.7)
ABSOLUTE MONO #: 0.73 K/UL (ref 0.1–1.2)
ANION GAP SERPL CALCULATED.3IONS-SCNC: 11 MMOL/L (ref 9–17)
BASOPHILS # BLD: 1 % (ref 0–2)
BASOPHILS ABSOLUTE: 0.08 K/UL (ref 0–0.2)
BUN SERPL-MCNC: 16 MG/DL (ref 8–23)
CALCIUM SERPL-MCNC: 8.9 MG/DL (ref 8.6–10.4)
CHLORIDE SERPL-SCNC: 103 MMOL/L (ref 98–107)
CO2 SERPL-SCNC: 21 MMOL/L (ref 20–31)
CREAT SERPL-MCNC: 0.77 MG/DL (ref 0.5–0.9)
EOSINOPHILS RELATIVE PERCENT: 5 % (ref 1–4)
GFR SERPL CREATININE-BSD FRML MDRD: >60 ML/MIN/1.73M2
GLUCOSE BLD-MCNC: 139 MG/DL (ref 65–105)
GLUCOSE BLD-MCNC: 236 MG/DL (ref 65–105)
GLUCOSE BLD-MCNC: 251 MG/DL (ref 65–105)
GLUCOSE BLD-MCNC: 325 MG/DL (ref 65–105)
GLUCOSE BLD-MCNC: 351 MG/DL (ref 65–105)
GLUCOSE SERPL-MCNC: 174 MG/DL (ref 70–99)
HCT VFR BLD AUTO: 39 % (ref 36.3–47.1)
HGB BLD-MCNC: 11.9 G/DL (ref 11.9–15.1)
IMMATURE GRANULOCYTES: 1 %
LYMPHOCYTES # BLD: 19 % (ref 24–43)
MCH RBC QN AUTO: 27.4 PG (ref 25.2–33.5)
MCHC RBC AUTO-ENTMCNC: 30.5 G/DL (ref 28.4–34.8)
MCV RBC AUTO: 89.7 FL (ref 82.6–102.9)
MONOCYTES # BLD: 9 % (ref 3–12)
NRBC AUTOMATED: 0 PER 100 WBC
PDW BLD-RTO: 15.5 % (ref 11.8–14.4)
PLATELET # BLD AUTO: 314 K/UL (ref 138–453)
PMV BLD AUTO: 10.8 FL (ref 8.1–13.5)
POTASSIUM SERPL-SCNC: 4.7 MMOL/L (ref 3.7–5.3)
RBC # BLD: 4.35 M/UL (ref 3.95–5.11)
RBC # BLD: ABNORMAL 10*6/UL
SEG NEUTROPHILS: 65 % (ref 36–65)
SEGMENTED NEUTROPHILS ABSOLUTE COUNT: 5.42 K/UL (ref 1.5–8.1)
SODIUM SERPL-SCNC: 135 MMOL/L (ref 135–144)
WBC # BLD AUTO: 8.2 K/UL (ref 3.5–11.3)

## 2023-02-05 PROCEDURE — 6370000000 HC RX 637 (ALT 250 FOR IP)

## 2023-02-05 PROCEDURE — 82947 ASSAY GLUCOSE BLOOD QUANT: CPT

## 2023-02-05 PROCEDURE — 97530 THERAPEUTIC ACTIVITIES: CPT

## 2023-02-05 PROCEDURE — 73030 X-RAY EXAM OF SHOULDER: CPT

## 2023-02-05 PROCEDURE — 85025 COMPLETE CBC W/AUTO DIFF WBC: CPT

## 2023-02-05 PROCEDURE — 36415 COLL VENOUS BLD VENIPUNCTURE: CPT

## 2023-02-05 PROCEDURE — 97535 SELF CARE MNGMENT TRAINING: CPT

## 2023-02-05 PROCEDURE — 92523 SPEECH SOUND LANG COMPREHEN: CPT

## 2023-02-05 PROCEDURE — 2580000003 HC RX 258

## 2023-02-05 PROCEDURE — 97166 OT EVAL MOD COMPLEX 45 MIN: CPT

## 2023-02-05 PROCEDURE — 6360000002 HC RX W HCPCS

## 2023-02-05 PROCEDURE — 80048 BASIC METABOLIC PNL TOTAL CA: CPT

## 2023-02-05 RX ORDER — CLOPIDOGREL BISULFATE 75 MG/1
75 TABLET ORAL DAILY
Qty: 30 TABLET | Refills: 0 | Status: SHIPPED | OUTPATIENT
Start: 2023-02-05

## 2023-02-05 RX ORDER — ENOXAPARIN SODIUM 100 MG/ML
30 INJECTION SUBCUTANEOUS 2 TIMES DAILY
Status: DISCONTINUED | OUTPATIENT
Start: 2023-02-05 | End: 2023-02-05 | Stop reason: HOSPADM

## 2023-02-05 RX ADMIN — INSULIN LISPRO 16 UNITS: 100 INJECTION, SOLUTION INTRAVENOUS; SUBCUTANEOUS at 16:13

## 2023-02-05 RX ADMIN — INSULIN LISPRO 12 UNITS: 100 INJECTION, SOLUTION INTRAVENOUS; SUBCUTANEOUS at 14:01

## 2023-02-05 RX ADMIN — LEVOTHYROXINE SODIUM 100 MCG: 100 TABLET ORAL at 06:34

## 2023-02-05 RX ADMIN — DESMOPRESSIN ACETATE 40 MG: 0.2 TABLET ORAL at 08:12

## 2023-02-05 RX ADMIN — METHOCARBAMOL TABLETS 750 MG: 750 TABLET, COATED ORAL at 05:09

## 2023-02-05 RX ADMIN — GABAPENTIN 300 MG: 300 CAPSULE ORAL at 14:00

## 2023-02-05 RX ADMIN — GABAPENTIN 300 MG: 300 CAPSULE ORAL at 06:34

## 2023-02-05 RX ADMIN — SODIUM CHLORIDE, PRESERVATIVE FREE 10 ML: 5 INJECTION INTRAVENOUS at 08:21

## 2023-02-05 RX ADMIN — ENOXAPARIN SODIUM 30 MG: 100 INJECTION SUBCUTANEOUS at 11:09

## 2023-02-05 RX ADMIN — CARBIDOPA AND LEVODOPA 1 TABLET: 25; 100 TABLET, EXTENDED RELEASE ORAL at 08:12

## 2023-02-05 RX ADMIN — ACETAMINOPHEN 1000 MG: 500 TABLET ORAL at 06:13

## 2023-02-05 NOTE — PROGRESS NOTES
Occupational Therapy  Facility/Department: [address] BURN UNIT  Occupational Therapy Initial Assessment    Name: Qi Hagan  : 1935  MRN: [id number]  Date of Service: 2023    Chief Complaint   Patient presents with    Fall         Discharge Recommendations:  Patient would benefit from continued therapy after discharge  OT Equipment Recommendations  Equipment Needed: Yes  Mobility Devices: Himanshu Angelica; ADL Assistive Devices  Walker: Rolling  ADL Assistive Devices: Shower Chair with back       Patient Diagnosis(es): The encounter diagnosis was Subarachnoid bleed ([address] Past Medical History:  has no past medical history on file. Past Surgical History:  has no past surgical history on file. Assessment   Performance deficits / Impairments: Decreased functional mobility ; Decreased ADL status; Decreased safe awareness;Decreased cognition;Decreased balance;Decreased high-level IADLs;Decreased posture  Assessment: Pt agreeable to OT eval this date.  utilized throughout entire session. Pt completed bed mobility with Min A to progress trunk to sitting. Pt tolerated static and dynamic sitting at EOB for ~20 min with fluctuating assistance of SBA-CGA required. Pt completed functional transfers with CGA and functional mobility with Min A d/t pt's impaired balance and poor safety awareness. Pt will benefit from continued OT services to address deficits in safety awareness, cognition, balance, and posture through use of therapeutic interventions to increase functional independence and safety with ADLs/IADLs and functional transfers/mobility.   Prognosis: Good  Decision Making: Medium Complexity  REQUIRES OT FOLLOW-UP: Yes  Activity Tolerance  Activity Tolerance: Patient Tolerated treatment well;Treatment limited secondary to decreased cognition        Plan   Occupational Therapy Plan  Times Per Week: 3-5 x/wk  Current Treatment Recommendations: Balance training, Functional mobility training, Endurance training, Cognitive reorientation, Safety education & training, Patient/Caregiver education & training, Equipment evaluation, education, & procurement, Self-Care / ADL, Home management training     Restrictions  Restrictions/Precautions  Restrictions/Precautions: Fall Risk  Required Braces or Orthoses?: No  Position Activity Restriction  Other position/activity restrictions: c-spine cleared per Amanda Luna MD, clarified via perfect serve, also cleared by Dr. Chavez brown per chart. Greenlandic  utilized was 21 [phone] Name: Cori Kelly  Patient assessed for rehabilitation services?: Yes  Family / Caregiver Present: No  General Comment  Comments: RN ok'd pt for OT eval this date. Pt agreeable to session and pleasant/cooperative throughout although pleasantly confused. Pt reported no pain and repeated multiple times throughout session \"Thank God and thank you all\"    Social/Functional History  Social/Functional History  Lives With: Family (Son and daughter in law and their children)  Type of Home: House  Home Layout: One level  Home Access: Level entry  Entrance Stairs - Number of Steps: 3  Entrance Stairs - Rails: Both  Bathroom Shower/Tub: Walk-in shower  Bathroom Toilet: Standard  Bathroom Equipment: Grab bars in shower, Grab bars around toilet  Home Equipment:  (pt reports no DME use at baseline)  Receives Help From: Family  ADL Assistance: [address]: Independent  Homemaking Responsibilities: No (family completes)  Ambulation Assistance: Independent  Transfer Assistance: Independent  Active : No  Patient's  Info: son and daughter in law  Occupation: Retired  Type of Occupation: sewing  Additional Comments: Pt reports between son and daughter in law, there will be 24/7 assistance available. Pt is questionable historian, information per pt via .  Information provided was noted to differ from PT evaluation       Objective   Safety Devices  Type of Devices: Call light within reach;Gait belt;Nurse notified;Left in bed;Bed alarm in place  Restraints  Restraints Initially in Place: No    Bed Mobility Training  Bed Mobility Training: Yes  Overall Level of Assistance: Minimum assistance;Additional time (pt engaged in sup > sit with Min A for trunk progression and retired to supine at end of session with CGA. VC provided throughout for appropriate sequencing)  Interventions: Safety awareness training;Verbal cues  Supine to Sit: Minimum assistance;Additional time  Sit to Supine: Contact-guard assistance;Additional time  Balance  Sitting: With support (upon attempting ROM assessment, pt lost balance posteriorly requiring CGA to correct. Pt required fluctuating assistance of SBA-CGA thereafter ~20 min sitting at EOB)  Standing: With support (pt completed static and dynamic standing for ~2 min with CGA and use of RW)  Transfer Training  Transfer Training: Yes  Overall Level of Assistance: Contact-guard assistance;Adaptive equipment (pt utilized RW and required CGA d/t slight balance concerns)  Interventions: Safety awareness training;Verbal cues  Sit to Stand: Contact-guard assistance;Adaptive equipment  Stand to Sit: Contact-guard assistance;Adaptive equipment  Gait  Overall Level of Assistance: Minimum assistance;Additional time;Adaptive equipment (pt engaged in functional mobility with Min A grossly for appropriate RW use. Pt tends to push RW too far forward and away from JOCELYNE and had difficulty correcting despite education. Mild balance and safety awareness deficits noted)  Interventions: Safety awareness training;Tactile cues;Verbal cues    AROM: Within functional limits  Strength: Within functional limits (grossly 4/5)  Coordination: Within functional limits  Tone: Normal  Sensation: Intact (denies numbness/tingling)    ADL  Feeding: Modified independent ;Setup  Feeding Skilled Clinical Factors: pt was setup with lunch at end of session and was able to feed self  independently  Grooming: Stand by assistance;Setup;Verbal cueing; Increased time to complete  UE Bathing: Contact guard assistance;Setup;Verbal cueing; Increased time to complete  LE Bathing: Contact guard assistance;Setup;Verbal cueing; Increased time to complete  UE Dressing: Contact guard assistance;Setup;Verbal cueing; Increased time to complete  LE Dressing: Minimal assistance;Setup;Verbal cueing; Increased time to complete  Toileting: Minimal assistance;Setup; Increased time to complete; Adaptive equipment    Vision  Vision: Impaired (pt states PRN use of glasses)  Hearing  Hearing: Exceptions to [facility]  Hearing Exceptions: Hard of hearing/hearing concerns    Cognition  Overall Cognitive Status: Exceptions  Following Commands: Follows one step commands with repetition; Follows one step commands with increased time  Safety Judgement: Decreased awareness of need for assistance;Decreased awareness of need for safety  Problem Solving: Assistance required to identify errors made;Decreased awareness of errors;Assistance required to generate solutions;Assistance required to implement solutions;Assistance required to correct errors made  Insights: Decreased awareness of deficits  Initiation: Requires cues for some  Sequencing: Requires cues for some  Orientation  Overall Orientation Status: Impaired  Orientation Level: Oriented to person;Disoriented to time;Disoriented to situation;Disoriented to place (Pt knew full name, but was unable to state birthday, pt [place], but unable to state [facility])                 Education Provided Comments: Pt ed on OT role, OT POC, orientation, safety awareness, transfer training, appropriate DME use, fall prevention, and importance of continued OT.  Poor return d/t cognition    LUE AROM (degrees)  LUE AROM : WFL  Left Hand AROM (degrees)  Left Hand AROM: WFL  RUE AROM (degrees)  RUE AROM : WFL  Right Hand AROM (degrees)  Right Hand AROM: WFL       Hand Dominance  Hand Dominance: Right            AM-PAC Score        AM-PAC Inpatient Daily Activity Raw Score: 19 (02/05/23 1636)  AM-PAC Inpatient ADL T-Scale Score : 40.22 (02/05/23 1636)  ADL Inpatient CMS 0-100% Score: 42.8 (02/05/23 1636)  ADL Inpatient CMS G-Code Modifier : CK (02/05/23 1636)    Goals  Short Term Goals  Time Frame for Short Term Goals: By discharge, pt will:  Short Term Goal 1: Demo Mod I for functional transfers and functional mobility with use of AE PRN for engagement in ADLs/IADLs  Short Term Goal 2: Demo I with UB ADLs  Short Term Goal 3: Demo SUP for LB ADLs and toileting tasks  Short Term Goal 4: Demo 8 min dynamic standing and reaching outside JOCELYNE with unilateral hand release and SUP for improved ADL/IADL participation  Short Term Goal 5: Follow 75% of 2-step commands with appropriate initiation and sequencing for improved functional independence  Short Term Goal 6: Demo good safety awareness throughout therapy session with <2 VCs       Therapy Time   Individual Concurrent Group Co-treatment   Time In 1314         Time Out 1348         Minutes 34         Timed Code Treatment Minutes: [address] [facility] KIARRA Frederick/L

## 2023-02-05 NOTE — PROGRESS NOTES
Trauma Tertiary Survey    Admit Date: 2/3/2023  Hospital day 1    [facility]     History reviewed. No pertinent past medical history. Scheduled Meds:   atorvastatin  40 mg Oral Daily    carbidopa-levodopa  1 tablet Oral BID    levothyroxine  100 mcg Oral QAM AC    metoprolol tartrate  25 mg Oral BID    sodium chloride flush  5-40 mL IntraVENous 2 times per day    methocarbamol  750 mg Oral Q6H    gabapentin  300 mg Oral q8h    acetaminophen  1,000 mg Oral q8h    insulin lispro  0-16 Units SubCUTAneous TID WC    insulin lispro  0-4 Units SubCUTAneous Nightly     Continuous Infusions:   sodium chloride      dextrose       PRN Meds:sodium chloride flush, sodium chloride, ondansetron **OR** ondansetron, oxyCODONE, glucose, dextrose bolus **OR** dextrose bolus, glucagon (rDNA), dextrose    Subjective:     Patient has complaints of right shoulder pain and headache. Patient denies any weakness, paresthesia to the right upper limb or elsewhere. Patient denies any visual changes, chest pain, dyspnea, nausea/vomiting and states her pain is well controlled with acetaminophen alone. Objective:   Patient Vitals for the past 8 hrs:   BP Temp Temp src Pulse Resp SpO2   02/05/23 0415 137/64 97.2 °F (36.2 °C) Oral 59 18 94 %   02/05/23 0030 135/87 97.6 °F (36.4 °C) Oral 59 20 --       I/O last 3 completed shifts: In: 719 [P.O.:150; I.V.:505]  Out: 1000 [Urine:1000]  I/O this shift:  In: 240 [P.O.:240]  Out: 1401 [place]    Radiology:  CT HEAD WO CONTRAST    Result Date: 2/4/2023  1. No significant change in the small amount of subarachnoid blood along the left parietal lobe. No significant mass effect or midline shift. 2. No new area of intracranial hemorrhage seen. 3. Mild chronic microvascular ischemic changes. 4. Atherosclerosis of the intracranial vasculature. CT HEAD WO CONTRAST    Result Date: 2/3/2023  Single small focus of subarachnoid hemorrhage in the left parietal region measuring 1.9 cm.      CT CHEST ABDOMEN PELVIS W CONTRAST Additional Contrast? None    Result Date: 2/4/2023  1. No acute traumatic injury in the aorta, chest, abdomen or pelvis. 2. No acute fracture in the thoracic or lumbar spine. 3. Incidental right middle lobe 9 mm nodule. Follow-up chest CT suggested in 3 months. CT LUMBAR SPINE TRAUMA RECONSTRUCTION    Result Date: 2/4/2023  1. No acute traumatic injury in the aorta, chest, abdomen or pelvis. 2. No acute fracture in the thoracic or lumbar spine. 3. Incidental right middle lobe 9 mm nodule. Follow-up chest CT suggested in 3 months. CT THORACIC SPINE TRAUMA RECONSTRUCTION    Result Date: 2/4/2023  1. No acute traumatic injury in the aorta, chest, abdomen or pelvis. 2. No acute fracture in the thoracic or lumbar spine. 3. Incidental right middle lobe 9 mm nodule. Follow-up chest CT suggested in 3 months. PHYSICAL EXAM (completed with aid of virtual )  GCS:  4 - Opens eyes on own   6 - Follows simple motor commands  5 - Alert and oriented    Pupil size:  Left 3 mm Right 3 mm  Pupil reaction: Yes  Wiggles fingers: Left Yes Right Yes  Hand grasp:   Left normal   Right normal  Wiggles toes: Left Yes    Right Yes  Plantar flexion: Left normal  Right normal      /64   Pulse 59   Temp 97.2 °F (36.2 °C) (Oral)   Resp 18   SpO2 94%   General appearance: alert, cooperative, and no distress  Head:  Normocephalic, right periorbital ecchymosis, minimal edema. Eyes: EOM intact, PERRL, no conjunctival hemorrhage bilaterally  Nose: Nares normal. Septum midline. Mucosa normal. No drainage or sinus tenderness. Abrasion on nasal bridge appreciable  Throat: lips, mucosa, and tongue normal; teeth and gums normal  Neck: no JVD and supple, symmetrical, trachea midline  Back: symmetric, no curvature. ROM normal. No CVA tenderness.   Lungs: clear to auscultation bilaterally  Heart: regular rate and rhythm, S1, S2 normal, no murmur, click, rub or gallop  Abdomen: soft, non-tender; bowel sounds normal; no masses,  no organomegaly  Extremities: extremities normal, atraumatic, no cyanosis or edema  Pulses: 2+ and symmetric  Neurologic: Grossly normal    Spine:     Spine Tenderness ROM   Cervical 0 /10 Normal   Thoracic 0 /10 Normal   Lumbar 0 /10 Not Indicated     Musculoskeletal    Joint Tenderness Swelling ROM   Right shoulder present absent Limited by pain   Left shoulder absent absent normal   Right elbow absent absent normal   Left elbow absent absent normal   Right wrist absent absent normal   Left wrist absent absent normal   Right hand grasp absent absent normal   Left hand grasp absent absent normal   Right hip absent absent normal   Left hip absent absent normal   Right knee absent absent normal   Left knee absent absent normal   Right ankle absent absent normal   Left ankle absent absent normal   Right foot absent absent normal   Left foot absent absent normal         CONSULTS: NSG    PROCEDURES: None    INJURIES:    - SAH  - R shoulder tenderness, XR negative for acute injury     Patient Active Problem List   Diagnosis    Intracranial hemorrhage (HCC)    Subarachnoid hemorrhage following injury, no loss of consciousness (HCC)         Assessment/Plan:     DISPOSITION: continue monitored bed          Sima Campos MD  2/5/23, 6:33 AM

## 2023-02-05 NOTE — PROGRESS NOTES
Assessed pt and unable to get pt to wake up right away. Pt was awake most of the night and is snoring lightly at present. Pt responsive to pain. VS all stable. Pupils responsive equally and bilaterally at this time. Pt did briefly open eyes and smiled at RN, but fell back asleep right away and was unable to stay awake to follow commands. Pt was assessed by 3 RN's. Will monitor closely.

## 2023-02-05 NOTE — CARE COORDINATION
Transitional planning:Phone call from RN. Son would like home care for patient. He does not care what company. 1057 Referral sent to Lilo Ravi 76. SHAINA from [facility] at [address] that they will accept patient [phone] to son at bedside to let him know that [facility] [employer] [facility] has accepted and will be contacting them.     [address] [facility] care orders to [facility] at [phone]

## 2023-02-05 NOTE — PROGRESS NOTES
1600 - LIAM cardenas served Dr. Vimal Oliveira regarding pts blood sugar being at 351. RN told to cover pt with sliding scale insulin and recheck in one hour as pt got 12 units of insulin at 1400 (due to late lunch tray) and the blood sugar did not decrease. [address] retook pts blood sugar which resulted as 251. Dr. Vimal Oliveira aware and no new orders. Pt ok to discharge according to Dr. Vimal Oliveira.

## 2023-02-05 NOTE — PROGRESS NOTES
TRAUMA PROGRESS NOTE        PATIENT NAME: Dora Zarate RECORD NO. [id number]  DATE: 2023    PRIMARY CARE PHYSICIAN: Kareen Sorenson MD    HD: # 2    ASSESSMENT    Patient Active Problem List   Diagnosis    Intracranial hemorrhage ([address]    Subarachnoid hemorrhage following injury, no loss of consciousness [facility])       MEDICAL DECISION MAKING AND PLAN  Neuro:  SAH left parietal region 1.9cm in size  Repeat head CT stable - no increase in size  Neurosurgery okay for DC  Dementia - alert to person and place  Renal/lytes  BUN/Cr 16/0.77  Heme  DVT prophylaxis, Lovenox   SCDs      DISPO: Home with daughter, has home health, DME equipment     Chief Complaint: \"fall\"    SUBJECTIVE    Charls Pump  feeling well. No headache. No chest pain. No SOB. No complaints at this time. Used  - speaks Malay, patient hard of hearing. Son to come visit later.       OBJECTIVE  VITALS: Temp: Temp: 97.2 °F (36.2 °C)Temp  Av.9 °F (36.6 °C)  Min: 97.2 °F (36.2 °C)  Max: 98.7 °F (55.8 °C) BP Systolic (99ZSI), PXT:310 , Min:114 , BKO:151   Diastolic (06DUU), NDO:94, Min:61, Max:87   Pulse Pulse  Av.1  Min: 52  Max: 83 Resp Resp  Av.9  Min: 14  Max: 23 Pulse ox SpO2  Av.4 %  Min: 84 %  Max: 97 %    CONSTITUTIONAL: awake alert smiling  HEENT: symmetrical smile, EOMI, PERRLA   LUNGS: ctab  CV: good pulses 2+ throughout 4 ext  GI: soft nontender  MUSCULOSKELETAL: normal appearing 4 ext, able to move all 4 without deficit  NEUROLOGIC: AxOx2, difficulty hearing,   SKIN: warm dry, bruising to right eye (periorbital)        Drain/tube output:      LAB:  CBC:   Recent Labs     23  0633 23  0424   WBC 10.1 8.4 8.2   HGB 12.8 12.5 11.9   HCT 40.0 40.8 39.0   MCV 87.7 90.7 89.7    291 314       BMP:   Recent Labs     23/23  0633 23  0424    133* 135   K 4.7 4.2 4.7    100 103   CO2 24 21 21   BUN 19 16 16   CREATININE 0.83 0.74 0.77   GLUCOSE 291* 77 174*           RADIOLOGY:  XR SHOULDER RIGHT (MIN 2 VIEWS)   Preliminary Result   No evidence of fracture or osseous malalignment at the right shoulder. 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 CT HEAD WO CONTRAST   Final Result   1. No significant change in the small amount of subarachnoid blood along the   left parietal lobe. No significant mass effect or midline shift. 2. No new area of intracranial hemorrhage seen. 3. Mild chronic microvascular ischemic changes. 4. Atherosclerosis of the intracranial vasculature. CT HEAD WO CONTRAST   Final Result   Single small focus of subarachnoid hemorrhage in the left parietal region   measuring 1.9 cm. CT CHEST ABDOMEN PELVIS W CONTRAST Additional Contrast? None   Final Result   1. No acute traumatic injury in the aorta, chest, abdomen or pelvis. 2. No acute fracture in the thoracic or lumbar spine. 3. Incidental right middle lobe 9 mm nodule. Follow-up chest CT suggested in   3 months. CT LUMBAR SPINE TRAUMA RECONSTRUCTION   Final Result   1. No acute traumatic injury in the aorta, chest, abdomen or pelvis. 2. No acute fracture in the thoracic or lumbar spine. 3. Incidental right middle lobe 9 mm nodule. Follow-up chest CT suggested in   3 months. CT THORACIC SPINE TRAUMA RECONSTRUCTION   Final Result   1. No acute traumatic injury in the aorta, chest, abdomen or pelvis. 2. No acute fracture in the thoracic or lumbar spine. 3. Incidental right middle lobe 9 mm nodule. Follow-up chest CT suggested in   3 months. CT HEAD WO CONTRAST    (Results Pending)       Electronically signed by Cyrena Sandhoff, MD on 2/5/2023 at 6:47 AM  Attestation signed by Delia Yarbrough MD    I personally evaluated the patient and directed the medical decision making with Resident/TYRON after the physical/radiologic exam and laboratory values were reviewed and confirmed.       Delia Yarbrough MD

## 2023-02-05 NOTE — DISCHARGE SUMMARY
DISCHARGE SUMMARY:    PATIENT NAME:  Rosalind Sher  YOB: 1935  MEDICAL RECORD NO. [id number]  DATE: 02/05/23  PRIMARY CARE PHYSICIAN: Harry Akhtar MD  ADMIT DATE: ***  DISPOSITION:  ***  DISCHARGE DATE:   ***  ADMITTING DIAGNOSIS:   [unfilled]    DIAGNOSIS:   Patient Active Problem List   Diagnosis    Intracranial hemorrhage (HCC)    Subarachnoid hemorrhage following injury, no loss of consciousness ([facility] [address]       CONSULTANTS:  {consultation:13790}    PROCEDURES:   ***:     HOSPITAL COURSE:   Rosalind Sher is a 81 y.o. female who was admitted on *** with ***    Labs and imaging were followed daily. At time of discharge, Rosalind Sher was tolerating a {SLP TOLERATE DIET:[id number]::\"regular diet\"}, having bowel movements, ambulating on *** own accord, had adequate analgesia on oral pain medications, and had no signs of symptoms of complications. She was deemed medically stable and discharged to *** on *** with instructions to ***. Pt expressed understanding of and agreement with DC plans. PHYSICAL EXAMINATION:        Discharge Vitals:  temporal temperature is 97.3 °F (36.3 °C). Her blood pressure is 112/71 and her pulse is 61. Her respiration is 20 and oxygen saturation is 95%.    General appearance - alert, well appearing, and in no distress  Chest - clear to ausculation  Heart - normal rate and regular rhythm  Abdomen - soft, non tender, non distended, bowel sounds present  Neurological - motor and sensory grossly normal bilaterally  Musculoskeletal - full range of motion without pain  Extremities - peripheral pulses normal, no pedal edema, no clubbing or cyanosis    LABS:     Recent Labs     02/03/23  2227 02/04/23  0633 02/05/23  0424   WBC 10.1 8.4 8.2   HGB 12.8 12.5 11.9   HCT 40.0 40.8 39.0    291 314    133* 135   K 4.7 4.2 4.7    100 103   CO2 24 21 21   BUN 19 16 16   CREATININE 0.83 0.74 0.77       DIAGNOSTIC TESTS:    CT HEAD WO CONTRAST    Result Date: 2/4/2023  EXAMINATION: CT OF THE HEAD WITHOUT CONTRAST  2/4/2023 6:01 am TECHNIQUE: CT of the head was performed without the administration of intravenous contrast. Automated exposure control, iterative reconstruction, and/or weight based adjustment of the mA/kV was utilized to reduce the radiation dose to as low as reasonably achievable. COMPARISON: 02/03/2023. HISTORY: ORDERING SYSTEM PROVIDED HISTORY: SAH TECHNOLOGIST PROVIDED HISTORY: [address] Reason for Exam: SAH Initial evaluation. FINDINGS: BRAIN/VENTRICLES: There is a small amount of subarachnoid blood seen along the left parietal lobe. This appears similar to the prior exam.  No new area of intracranial hemorrhage is identified. There is no significant mass effect or midline shift. The gray-white differentiation appears maintained. No evidence of hydrocephalus. There are areas of hypoattenuation in the periventricular and subcortical white matter, which is nonspecific, but may represent chronic microvasvular ischemic change. Atherosclerosis of the intracranial vasculature is noted. ORBITS: The visualized portion of the orbits demonstrate no acute abnormality. SINUSES: The visualized paranasal sinuses and mastoid air cells demonstrate no acute abnormality. SOFT TISSUES/SKULL:  No acute abnormality of the visualized skull or soft tissues. 1. No significant change in the small amount of subarachnoid blood along the left parietal lobe. No significant mass effect or midline shift. 2. No new area of intracranial hemorrhage seen. 3. Mild chronic microvascular ischemic changes. 4. Atherosclerosis of the intracranial vasculature.      CT HEAD WO CONTRAST    Result Date: 2/3/2023  EXAMINATION: CT OF THE HEAD WITHOUT CONTRAST  2/3/2023 10:19 pm TECHNIQUE: CT of the head was performed without the administration of intravenous contrast. Automated exposure control, iterative reconstruction, and/or weight based adjustment of the mA/kV was utilized to reduce the radiation dose to as low as reasonably achievable. COMPARISON: None. HISTORY: ORDERING SYSTEM PROVIDED HISTORY: trauma, known SAH TECHNOLOGIST PROVIDED HISTORY: trauma, known [address] Decision Support Exception - unselect if not a suspected or confirmed emergency medical condition->Emergency Medical Condition (MA) FINDINGS: BRAIN/VENTRICLES: Single small focus of subarachnoid hemorrhage in the left parietal region measuring 1.9 cm. No significant mass effect or midline shift. The gray-white differentiation is maintained without evidence of an acute infarct. There is no evidence of hydrocephalus. ORBITS: The visualized portion of the orbits demonstrate no acute abnormality. SINUSES: The visualized paranasal sinuses and mastoid air cells demonstrate no acute abnormality. SOFT TISSUES/SKULL:  No acute abnormality of the visualized skull or soft tissues. Single small focus of subarachnoid hemorrhage in the left parietal region measuring 1.9 cm. CT CHEST ABDOMEN PELVIS W CONTRAST Additional Contrast? None    Result Date: 2/4/2023  EXAMINATION: CT OF THE CHEST, ABDOMEN, AND PELVIS WITH CONTRAST; CT OF THE LUMBAR SPINE WITHOUT CONTRAST; CT OF THE THORACIC SPINE WITHOUT CONTRAST, 2/3/2023 10:19 pm TECHNIQUE: CT of the chest, abdomen and pelvis was performed with the administration of intravenous contrast. Multiplanar reformatted images are provided for review. Automated exposure control, iterative reconstruction, and/or weight based adjustment of the mA/kV was utilized to reduce the radiation dose to as low as reasonably achievable.; CT of the lumbar spine was performed without the administration of intravenous contrast. Multiplanar reformatted images are provided for review. Adjustment of mA and/or kV according to patient size was utilized. Automated exposure control, iterative reconstruction, and/or weight based adjustment of the mA/kV was utilized to reduce the radiation dose to as low as reasonably achievable. ; CT of the thoracic spine was performed without the administration of intravenous contrast. Multiplanar reformatted images are provided for review. Automated exposure control, iterative reconstruction, and/or weight based adjustment of the mA/kV was utilized to reduce the radiation dose to as low as reasonably achievable. COMPARISON: None. HISTORY: ORDERING SYSTEM PROVIDED HISTORY: trauma TECHNOLOGIST PROVIDED HISTORY: trauma Decision Support Exception - unselect if not a suspected or confirmed emergency medical condition->Emergency Medical Condition ([place]) Reason for Exam: fall  ; ORDERING SYSTEM PROVIDED HISTORY: trauma TECHNOLOGIST PROVIDED HISTORY: trauma Reason for Exam: fall; ORDERING SYSTEM PROVIDED HISTORY: trauma TECHNOLOGIST PROVIDED HISTORY: trauma FINDINGS: CTA CHEST: No acute traumatic injury of the thoracic aorta. Aortic arch branch vessels demonstrate no severe stenosis or occlusion. No mediastinal hematoma. Mild cardiomegaly. No pericardial effusion. No thoracic adenopathy. No acute lung or pleural injury. In the right middle lobe, there is a 9 mm nodule at the costophrenic angle. Linear right upper lobe and left upper lobe atelectasis. Linear left lower lobe atelectasis. No pleural effusion or pneumothorax. No central endobronchial lesion. No acute abnormality of the visualized osseous structures. CTA ABDOMEN: No acute injury of the abdominal aorta. Severe right renal artery stenosis. The liver, spleen, pancreas, adrenal glands and kidneys demonstrate no acute abnormality. Status post cholecystectomy. Nonspecific common bile duct dilation in the post cholecystectomy setting. No acutely dilated or inflamed loops of bowel. No evidence of acute bowel or mesenteric injury. No hemoperitoneum or pneumoperitoneum. CTA PELVIS: No acute injury of the iliac arteries. No pelvic hematoma or free fluid. No mass or adenopathy. No acute fracture.  THORACIC/LUMBAR SPINE: No acute fracture in the thoracic spine or lumbar spine. No lytic or blastic lesion. Multilevel mild-moderate degenerative changes. No severe central canal narrowing. Grade 1 anterolisthesis of L4. Moderate L4-5 central canal narrowing. 1. No acute traumatic injury in the aorta, chest, abdomen or pelvis. 2. No acute fracture in the thoracic or lumbar spine. 3. Incidental right middle lobe 9 mm nodule. Follow-up chest CT suggested in 3 months. CT LUMBAR SPINE TRAUMA RECONSTRUCTION    Result Date: 2/4/2023  EXAMINATION: CT OF THE CHEST, ABDOMEN, AND PELVIS WITH CONTRAST; CT OF THE LUMBAR SPINE WITHOUT CONTRAST; CT OF THE THORACIC SPINE WITHOUT CONTRAST, 2/3/2023 10:19 pm TECHNIQUE: CT of the chest, abdomen and pelvis was performed with the administration of intravenous contrast. Multiplanar reformatted images are provided for review. Automated exposure control, iterative reconstruction, and/or weight based adjustment of the mA/kV was utilized to reduce the radiation dose to as low as reasonably achievable.; CT of the lumbar spine was performed without the administration of intravenous contrast. Multiplanar reformatted images are provided for review. Adjustment of mA and/or kV according to patient size was utilized. Automated exposure control, iterative reconstruction, and/or weight based adjustment of the mA/kV was utilized to reduce the radiation dose to as low as reasonably achievable.; CT of the thoracic spine was performed without the administration of intravenous contrast. Multiplanar reformatted images are provided for review. Automated exposure control, iterative reconstruction, and/or weight based adjustment of the mA/kV was utilized to reduce the radiation dose to as low as reasonably achievable. COMPARISON: None.  HISTORY: ORDERING SYSTEM PROVIDED HISTORY: trauma TECHNOLOGIST PROVIDED HISTORY: trauma Decision Support Exception - unselect if not a suspected or confirmed emergency medical condition->Emergency Medical Condition ([place]) Reason for Exam: fall  ; ORDERING SYSTEM PROVIDED HISTORY: trauma TECHNOLOGIST PROVIDED HISTORY: trauma Reason for Exam: fall; ORDERING SYSTEM PROVIDED HISTORY: trauma TECHNOLOGIST PROVIDED HISTORY: trauma FINDINGS: CTA CHEST: No acute traumatic injury of the thoracic aorta. Aortic arch branch vessels demonstrate no severe stenosis or occlusion. No mediastinal hematoma. Mild cardiomegaly. No pericardial effusion. No thoracic adenopathy. No acute lung or pleural injury. In the right middle lobe, there is a 9 mm nodule at the costophrenic angle. Linear right upper lobe and left upper lobe atelectasis. Linear left lower lobe atelectasis. No pleural effusion or pneumothorax. No central endobronchial lesion. No acute abnormality of the visualized osseous structures. CTA ABDOMEN: No acute injury of the abdominal aorta. Severe right renal artery stenosis. The liver, spleen, pancreas, adrenal glands and kidneys demonstrate no acute abnormality. Status post cholecystectomy. Nonspecific common bile duct dilation in the post cholecystectomy setting. No acutely dilated or inflamed loops of bowel. No evidence of acute bowel or mesenteric injury. No hemoperitoneum or pneumoperitoneum. CTA PELVIS: No acute injury of the iliac arteries. No pelvic hematoma or free fluid. No mass or adenopathy. No acute fracture. THORACIC/LUMBAR SPINE: No acute fracture in the thoracic spine or lumbar spine. No lytic or blastic lesion. Multilevel mild-moderate degenerative changes. No severe central canal narrowing. Grade 1 anterolisthesis of L4. Moderate L4-5 central canal narrowing. 1. No acute traumatic injury in the aorta, chest, abdomen or pelvis. 2. No acute fracture in the thoracic or lumbar spine. 3. Incidental right middle lobe 9 mm nodule. Follow-up chest CT suggested in 3 months.      CT THORACIC SPINE TRAUMA RECONSTRUCTION    Result Date: 2/4/2023  EXAMINATION: CT OF THE CHEST, ABDOMEN, AND PELVIS WITH CONTRAST; CT OF THE LUMBAR SPINE WITHOUT CONTRAST; CT OF THE THORACIC SPINE WITHOUT CONTRAST, 2/3/2023 10:19 pm TECHNIQUE: CT of the chest, abdomen and pelvis was performed with the administration of intravenous contrast. Multiplanar reformatted images are provided for review. Automated exposure control, iterative reconstruction, and/or weight based adjustment of the mA/kV was utilized to reduce the radiation dose to as low as reasonably achievable.; CT of the lumbar spine was performed without the administration of intravenous contrast. Multiplanar reformatted images are provided for review. Adjustment of mA and/or kV according to patient size was utilized. Automated exposure control, iterative reconstruction, and/or weight based adjustment of the mA/kV was utilized to reduce the radiation dose to as low as reasonably achievable.; CT of the thoracic spine was performed without the administration of intravenous contrast. Multiplanar reformatted images are provided for review. Automated exposure control, iterative reconstruction, and/or weight based adjustment of the mA/kV was utilized to reduce the radiation dose to as low as reasonably achievable. COMPARISON: None. HISTORY: ORDERING SYSTEM PROVIDED HISTORY: trauma TECHNOLOGIST PROVIDED HISTORY: trauma Decision Support Exception - unselect if not a suspected or confirmed emergency medical condition->Emergency Medical Condition (MA) Reason for Exam: fall  ; ORDERING SYSTEM PROVIDED HISTORY: trauma TECHNOLOGIST PROVIDED HISTORY: trauma Reason for Exam: fall; ORDERING SYSTEM PROVIDED HISTORY: trauma TECHNOLOGIST PROVIDED HISTORY: trauma FINDINGS: CTA CHEST: No acute traumatic injury of the thoracic aorta. Aortic arch branch vessels demonstrate no severe stenosis or occlusion. No mediastinal hematoma. Mild cardiomegaly. No pericardial effusion. No thoracic adenopathy. No acute lung or pleural injury.   In the right middle lobe, there is a 9 mm nodule at the costophrenic angle. Linear right upper lobe and left upper lobe atelectasis. Linear left lower lobe atelectasis. No pleural effusion or pneumothorax. No central endobronchial lesion. No acute abnormality of the visualized osseous structures. CTA ABDOMEN: No acute injury of the abdominal aorta. Severe right renal artery stenosis. The liver, spleen, pancreas, adrenal glands and kidneys demonstrate no acute abnormality. Status post cholecystectomy. Nonspecific common bile duct dilation in the post cholecystectomy setting. No acutely dilated or inflamed loops of bowel. No evidence of acute bowel or mesenteric injury. No hemoperitoneum or pneumoperitoneum. CTA PELVIS: No acute injury of the iliac arteries. No pelvic hematoma or free fluid. No mass or adenopathy. No acute fracture. THORACIC/LUMBAR SPINE: No acute fracture in the thoracic spine or lumbar spine. No lytic or blastic lesion. Multilevel mild-moderate degenerative changes. No severe central canal narrowing. Grade 1 anterolisthesis of L4. Moderate L4-5 central canal narrowing. 1. No acute traumatic injury in the aorta, chest, abdomen or pelvis. 2. No acute fracture in the thoracic or lumbar spine. 3. Incidental right middle lobe 9 mm nodule. Follow-up chest CT suggested in 3 months.              DISCHARGE INSTRUCTIONS     Discharge Medications:        Medication List        CONTINUE taking these medications      alendronate 70 MG tablet  Commonly known as: FOSAMAX     aspirin 81 MG EC tablet     atorvastatin 40 MG tablet  Commonly known as: LIPITOR     busPIRone 5 MG tablet  Commonly known as: BUSPAR     carbidopa-levodopa  MG per extended release tablet  Commonly known as: SINEMET CR     docusate sodium 100 MG capsule  Commonly known as: COLACE     ezetimibe 10 MG tablet  Commonly known as: ZETIA     furosemide 20 MG tablet  Commonly known as: LASIX     glipiZIDE 10 MG tablet  Commonly known as: GLUCOTROL     Januvia 100 MG tablet  Generic drug: SITagliptin     levothyroxine 100 MCG tablet  Commonly known as: SYNTHROID     metFORMIN 850 MG tablet  Commonly known as: GLUCOPHAGE     metoprolol tartrate 25 MG tablet  Commonly known as: LOPRESSOR     Myrbetriq 25 MG Tb24  Generic drug: mirabegron     rivastigmine 3 MG capsule  Commonly known as: EXELON     sertraline 50 MG tablet  Commonly known as: ZOLOFT     traMADol 50 MG tablet  Commonly known as: ULTRAM     valsartan 80 MG tablet  Commonly known as: DIOVAN     vitamin B-12 1000 MCG tablet  Commonly known as: CYANOCOBALAMIN            STOP taking these medications      clopidogrel 75 MG tablet  Commonly known as: PLAVIX            Diet: ADULT DIET;  Regular; 4 carb choices (60 gm/meal) diet as tolerated  Activity: - Avoid strenuous activity or exercise until cleared during follow-up appointment  - No driving or operating heavy machinery while taking narcotics   Wound Care: Daily and as needed  Follow-up:   Call *** Clinic to make appointment with Dr. Mickey Chaudhary in:  {NUMBERS 1-10:35678}{HH DAYS/WEEKS/MONTHS:886636}  Follow up in the next few weeks with PCP: Rosie Tate MD    Time Spent for discharge: 30 minutes    Maria Teresa Choi MD  2/5/2023, 10:22 AM

## 2023-02-05 NOTE — DISCHARGE INSTR - COC
Continuity of Care Form    Patient Name: Hannah Mata   :  1935  MRN:  [id number]    Admit date:  2/3/2023  Discharge date:  23    Code Status Order: Full Code   Advance Directives:     Admitting Physician:  Heaven Tubbs MD  PCP: Shiva Romano MD    Discharging Nurse: Lata Garcia RN  [address] Unit/Room#: 5643/3155-44  Discharging Unit Phone Number: [phone]    Emergency Contact:   Extended Emergency Contact Information  Primary Emergency Contact: [address] Phone: [phone]  Relation: Child  Preferred language: English   needed? No    Past Surgical History:  History reviewed. No pertinent surgical history. Immunization History:   Immunization History   Administered Date(s) Administered    COVID-19, PFIZER PURPLE top, DILUTE for use, (age 15 y+), 30mcg/0.3mL 2021, 2021, 2021       Active Problems:  Patient Active Problem List   Diagnosis Code    Intracranial hemorrhage (HCC) I62.9    Subarachnoid hemorrhage following injury, no loss of consciousness ([facility] [address] S06.6X0A       Isolation/Infection:   Isolation            No Isolation          Patient Infection Status       None to display            Nurse Assessment:  Last Vital Signs: BP (!) 144/69   Pulse 71   Temp 97 °F (36.1 °C) (Temporal)   Resp 21   SpO2 91%     Last documented pain score (0-10 scale): Pain Level: 3  Last Weight:   Wt Readings from Last 1 Encounters:   22 170 lb (77.1 kg)     Mental Status:  disoriented and alert    IV Access:  - None    Nursing Mobility/ADLs:  Walking   Assisted  Transfer  Independent  Bathing  Assisted  Dressing  Assisted  Toileting  Assisted  Feeding  [address]  Independent  Med Delivery   whole    Wound Care Documentation and Therapy:        Elimination:  Continence:    Bowel: Yes  Bladder: Yes  Urinary Catheter: None   Colostomy/Ileostomy/Ileal Conduit: No       Date of Last BM: ***    Intake/Output Summary (Last 24 hours) at 2023 Luke Lugo filed at 2/5/2023 0558  Gross per 24 hour   Intake 240 ml   Output 1200 ml   Net -960 ml     I/O last 3 completed shifts: In: 80 [P.O.:390; I.V.:505]  Out: 1700 [Urine:1700]    Safety Concerns:     History of Falls (last 30 days) and At Risk for Falls    Impairments/Disabilities:      Language Barrier - speaks Slovak only    Nutrition Therapy:  Current Nutrition Therapy:   - Oral Diet:  Carb Control 4 carbs/meal (1800kcals/day)    Routes of Feeding: Oral  Liquids: No Restrictions  Daily Fluid Restriction: no  Last Modified Barium Swallow with Video (Video Swallowing Test): not done    Treatments at the Time of Hospital Discharge:   Respiratory Treatments: none  Oxygen Therapy:  is not on home oxygen therapy.   Ventilator:    - No ventilator support    Rehab Therapies: Physical Therapy and Occupational Therapy  Weight Bearing Status/Restrictions: No weight bearing restrictions  Other Medical Equipment (for information only, NOT a DME order):  walker  Other Treatments: none    Patient's personal belongings (please select all that are sent with patient):  Dentures upper    RN SIGNATURE:  Electronically signed by Luis Manuel Ward RN on 2/5/23 at 11:29 AM EST    CASE MANAGEMENT/SOCIAL WORK SECTION    Inpatient Status Date: 2/3/23    Readmission Risk Assessment Score:  Readmission Risk              Risk of Unplanned Readmission:  11           Discharging to Facility/ Agency   Name: home  Address:  Phone:  Fax:    Dialysis Facility (if applicable)   Name:  Address:  Dialysis Schedule:  Phone:  Fax:    / signature: Electronically signed by Maryellen Mcgowan RN on 2/5/23 at 2:54 PM EST    PHYSICIAN SECTION    Prognosis: {Prognosis:2584072534}    Condition at Discharge: [address] Patient Condition:[id number]}    Rehab Potential (if transferring to Rehab): {Prognosis:9289913604}    Recommended Labs or Other Treatments After Discharge: ***    Physician Certification: I certify the above information and transfer of Lexi Rangel  is necessary for the continuing treatment of the diagnosis listed and that she requires {Admit to Appropriate Level of Care:36334} for {GREATER/LESS:363409353} 30 days.      Update Admission H&P: {CHP DME Changes in SVMFI:358592441}    PHYSICIAN SIGNATURE:  {Esignature:563970862}

## 2023-02-05 NOTE — PROGRESS NOTES
Speech Language Pathology  Facility/Department: [address] BURN UNIT  Initial Speech/Language/Cognitive Assessment    NAME: Kenneth Campbell  : 1935   MRN: [id number]  ADMISSION DATE: 2/3/2023  ADMITTING DIAGNOSIS: has Intracranial hemorrhage (HCC) and Subarachnoid hemorrhage following injury, no loss of consciousness ([address] on their problem list.      Date of Eval: 2023   Evaluating Therapist: Roman Fair        Primary Complaint:   Obtained from H&P  Kenneth Campbell is a 80 y/o Tajik speaking female that presented to the Emergency Department via EMS after suffering a fall from standing. Patient is a native Tajik speaker and history was obtained using the help of a native Tajik speaking medical student until [place] [address] speaking  available via iPad. Patient states that she fell face first from standing height and was unable to catch her self. Denies loss of consciousness. Patient takes aspirin and Plavix. Was initially taken to [facility].  CT of the head performed at [place] [address] revealed small left parietal subarachnoid hemorrhage. Patient was then subsequently transferred to [facility] for further evaluation and treatment. Upon presentation to [facility], patient is awake and alert, GCS 15, able to move all 4 extremities. Complaining of cervical spine pain, however CT C-spine performed at [facility] was negative for any acute osseous abnormality.        Pain:  Pain Assessment  Pain Assessment: None - Denies Pain  Pain Level: 3  Patient's Stated Pain Goal: 0 - No pain  Pain Location: Generalized  Pain Descriptors: Aching, Discomfort  Non-Pharmaceutical Pain Intervention(s): Repositioned, Rest  Response to Pain Intervention: Patient satisfied  Side Effects: No reported side effects    Vision/ Hearing  Vision  Vision: Within Functional Limits  Hearing  Hearing: Exceptions to [facility]  Hearing Exceptions: Hard of hearing/hearing concerns    Assessment:  Cognitive Diagnosis: Pt presented with mild-mod cognitive-communication deficits characterized by difficulty with answering yes/no questions and wh-questions, in addition to having difficulty orienting to time. Pt. Presents with no dysarthria, no O/M deficits at this time. ST to follow up and provide treatment to address noted deficits. Education provided. ST recommends 3-5x/week at this time. Pt was reported to have dementia at baseline and family does not report any significant changes s/p fall. Assessment was completed with assistance of  device, which son also assisted in. Son reported they speak a [place] dialect of Persian and  used was not so he was assisting with his mother's comprehension. Recommendations:  Recommendations  Requires SLP Intervention: Yes  Patient Education: reviewed with pt and son  Patient Education Response: Verbalizes understanding             Plan:   Speech Therapy Prognosis  Prognosis: Fair  Prognosis Considerations: Age; Co-Morbidities  Individuals consulted  Consulted and agree with results and recommendations: Patient;RN;Family member  Family member consulted: son  RN Name: Riya Deluna    Goals:  Short Term Goals  Goal 1: Pt will answer yes/no questions with 90% accuracy  Goal 2: Pt will answer mod wh-questions with 90% accuracy.    Patient/family involved in developing goals and treatment plan: yes    Subjective:   Previous level of function and limitations: living at home with family for assistance with daily tasks and safety     Social/Functional History  Lives With: Family  Vision  Vision: Within Functional Limits  Hearing  Hearing: Exceptions to [facility]  Hearing Exceptions: Hard of hearing/hearing concerns           Objective:       Oral Motor   Labial: No impairment  Lingual: No impairment    Motor Speech  Dysarthric Characteristics: None  Intelligibility: No impairment  Overall Impairment Severity: None    Auditory Comprehension  Comprehension: Exceptions  Basic Questions: Mild (2/3)  Complex Questions: Moderate (1/5)  One Step Commands: WFL (with extended processing time)  Two Step Commands: Moderate (with extended processing time and gestural cues)  Common Objects: WFL         Expression  Primary Mode of Expression: Verbal    Verbal Expression  Verbal Expression: Within functional limits  Automatic Speech: WFL (1-10 and days of week)  Confrontation: WFL (items throughout room)            Cognition:      Orientation  Overall Orientation Status: Impaired  Orientation Level: Oriented to person;Oriented to place; Disoriented to time  Attention  Attention: Within Functional Limits  Memory  Memory: Unable to assess        Prognosis:  Speech Therapy Prognosis  Prognosis: Fair  Prognosis Considerations: Age; Co-Morbidities  Individuals consulted  Consulted and agree with results and recommendations: Patient;RN;Family member  Family member consulted: son  RN Name: Sheliah Ormond    Education:  Patient Education: reviewed with pt and son  Patient Education Response: Verbalizes understanding          Therapy Time:   Individual Concurrent Group Co-treatment   Time In          Time Out 6622         Minutes 26                 Electronically signed by Chava SNYDER CCC-SLP  on 2/5/2023 at 10:49 AM

## 2023-02-05 NOTE — PROGRESS NOTES
REJI Valentine Both was evaluated today and a DME order was entered for a wheeled walker because she requires this to successfully complete daily living tasks of ambulating. A wheeled walker is necessary due to the patient's unsteady gait, upper body weakness, and inability to  an ambulation device; and she can ambulate only by pushing a walker instead of a lesser assistive device such as a cane, crutch, or standard walker.      Golda Osgood, DO  General Surgery PGY-4

## 2023-02-05 NOTE — PROGRESS NOTES
RN was in other patients room when [facility] RN saw patient became tachy on the monitor and went to check on patient. She found patient standing at bedside and voiding/having large brown bowel movement on floor. She had patient sit down on the floor since the floor was wet and slippery. Patient did not hit her head. I came to bedside to help clean and shower the patient. RN used the  to assess patient. VSS and Dr. Laci Ramesh notified and came down. Pt told RN she was fine and was thankful we showered her after her accident.

## 2023-02-05 NOTE — PROGRESS NOTES
Reassessed patient. Pt opened eyes and is responding appropriately. Able to follow commands. Toileted pt per request.Ambulated to restroom with walker. Will continue to monitor.

## 2023-02-05 NOTE — PROGRESS NOTES
REJI Valentine Both requires a shower chair to perform her activities of daily living - grooming and is physically incapable of utilizing regular shower facilities.      Golda Osgood, DO  General Surgery PGY-4

## 2023-02-05 NOTE — CARE COORDINATION
Discharge [facility] Case Management Department  Written by: Lawyer Mitch RN    Patient Name: Shea Rehman  Attending Provider: Karis Ansari MD  Admit Date: 2/3/2023 10:03 PM  MRN: [id number]  Account: [de-identified]                     : 1935  Discharge Date:       Disposition: home    Lawyer Mitch RN

## 2023-02-05 NOTE — PLAN OF CARE
Problem: Discharge Planning  Goal: Discharge to home or other facility with appropriate resources  Outcome: Completed     Problem: Pain  Goal: Verbalizes/displays adequate comfort level or baseline comfort level  Outcome: Completed     Problem: Safety - Adult  Goal: Free from fall injury  Outcome: Completed     Problem: ABCDS Injury Assessment  Goal: Absence of physical injury  Outcome: Completed     Problem: SLP Adult - Disturbed Thought Process  Goal: By Discharge: Demonstrates cognitive skills at highest level of function for planned discharge setting. See evaluation for individualized goals.   2/5/2023 1129 by Melva Garcia  Outcome: Progressing

## 2023-02-05 NOTE — PROGRESS NOTES
RN went over all d/c instructions with son and patient. All questions were answered in full and RN emphasized not to take plavix until neuro follow up. Pt left with all belongings including upper dentures. RN wheeled pt to main lobby and helped into car. Home care agency needed AVS faxed, RN faxed it.

## 2023-02-13 ENCOUNTER — HOSPITAL ENCOUNTER (OUTPATIENT)
Dept: CT IMAGING | Age: 88
Discharge: HOME | End: 2023-02-15
Payer: COMMERCIAL

## 2023-02-13 DIAGNOSIS — I60.9 SUBARACHNOID BLEED (HCC): ICD-10-CM

## 2023-02-13 PROCEDURE — 70450 CT HEAD/BRAIN W/O DYE: CPT

## 2023-02-21 ENCOUNTER — OFFICE VISIT (OUTPATIENT)
Dept: NEUROSURGERY | Age: 88
End: 2023-02-21
Payer: COMMERCIAL

## 2023-02-21 VITALS
HEIGHT: 65 IN | TEMPERATURE: 97.3 F | WEIGHT: 170 LBS | HEART RATE: 67 BPM | DIASTOLIC BLOOD PRESSURE: 71 MMHG | SYSTOLIC BLOOD PRESSURE: 135 MMHG | OXYGEN SATURATION: 93 % | BODY MASS INDEX: 28.32 KG/M2
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
## 2023-02-21 DIAGNOSIS — Z79.02 LONG TERM CURRENT USE OF ANTITHROMBOTICS/ANTIPLATELETS: ICD-10-CM

## 2023-02-21 DIAGNOSIS — S06.6X0D SUBARACHNOID HEMORRHAGE FOLLOWING INJURY, NO LOSS OF CONSCIOUSNESS, SUBSEQUENT ENCOUNTER: Primary | ICD-10-CM

## 2023-02-21 PROCEDURE — 1123F ACP DISCUSS/DSCN MKR DOCD: CPT | Performed by: NURSE PRACTITIONER

## 2023-02-21 PROCEDURE — 99214 OFFICE O/P EST MOD 30 MIN: CPT | Performed by: NURSE PRACTITIONER

## 2023-02-21 NOTE — PROGRESS NOTES
[address]  [facility] [address] [facility] [address]  Dept: [phone]    Patient:  Jayson Pink  YOB: 1935  Date: 2/21/23    The patient is a 81 y.o. female who presents today for consult of the following problems:     Chief Complaint   Patient presents with    Neurologic Problem         HPI:     Jayson Pink is a 81 y.o. female who presents to the office with her son for follow-up of recent hospitalization following fall. Patient fell on the ice, resulting in left parietal subarachnoid hemorrhage. Patient has been doing very well since discharge. Per son has essentially returned to baseline. Does have some mild gait instability. Does have home physical therapy starting later today. Patient completed repeat CT scan last week, waiting to resume aspirin and Plavix pending stability. Patient has not been complaining of any headaches. No seizures. History:     History reviewed. No pertinent past medical history. History reviewed. No pertinent surgical history. History reviewed. No pertinent family history.   Current Outpatient Medications on File Prior to Visit   Medication Sig Dispense Refill    clopidogrel (PLAVIX) 75 MG tablet Take 1 tablet by mouth daily 30 tablet 0    vitamin B-12 (CYANOCOBALAMIN) 1000 MCG tablet Take 1,000 mcg by mouth daily      aspirin 81 MG EC tablet Take 81 mg by mouth daily      alendronate (FOSAMAX) 70 MG tablet Take 70 mg by mouth once a week      atorvastatin (LIPITOR) 40 MG tablet Take 40 mg by mouth daily      busPIRone (BUSPAR) 5 MG tablet Take 5 mg by mouth 2 times daily      carbidopa-levodopa (SINEMET CR)  MG per extended release tablet Take  tablets by mouth 2 times daily      docusate sodium (COLACE) 100 MG capsule Take 100 mg by mouth daily      ezetimibe (ZETIA) 10 MG tablet Take 10 mg by mouth daily      furosemide (LASIX) 20 MG tablet Take 20 mg by mouth three times a week      glipiZIDE (GLUCOTROL) 10 MG tablet Take 10 mg by mouth 2 times daily      levothyroxine (SYNTHROID) 100 MCG tablet Take 100 mcg by mouth every morning (before breakfast)      metFORMIN (GLUCOPHAGE) 850 MG tablet Take 850 mg by mouth 2 times daily (with meals)      metoprolol tartrate (LOPRESSOR) 25 MG tablet Take 25 mg by mouth 2 times daily      rivastigmine (EXELON) 3 MG capsule Take 3 mg by mouth 2 times daily      mirabegron (MYRBETRIQ) 25 MG TB24 Take 1 tablet by mouth daily      sertraline (ZOLOFT) 50 MG tablet Take 50 mg by mouth daily      traMADol (ULTRAM) 50 MG tablet Take 50 mg by mouth 2 times daily as needed. valsartan (DIOVAN) 80 MG tablet Take 80 mg by mouth daily      SITagliptin (JANUVIA) 100 MG tablet Take 100 mg by mouth daily       No current facility-administered medications on file prior to visit. Social History     Tobacco Use    Smoking status: Never    Smokeless tobacco: Never   Substance Use Topics    Alcohol use: Defer    Drug use: Defer       No Known Allergies    Review of Systems  Constitutional: Negative for activity change and appetite change. HENT: Negative for ear pain and facial swelling. Eyes: Negative for discharge and itching. Respiratory: Negative for choking and chest tightness. Cardiovascular: Negative for chest pain and leg swelling. Gastrointestinal: Negative for nausea and abdominal pain. Endocrine: Negative for cold intolerance and heat intolerance. Genitourinary: Negative for frequency and flank pain. Musculoskeletal: Negative for myalgias and joint swelling. Skin: Negative for rash and wound. Allergic/Immunologic: Negative for environmental allergies and food allergies. Hematological: Negative for adenopathy. Does not bruise/bleed easily. Psychiatric/Behavioral: Negative for self-injury. The patient is not nervous/anxious.       Physical Exam:      /71 (Site: Right Upper Arm, Position: Sitting, Cuff Size: Large Adult)   Pulse 67   Temp 97.3 °F (36.3 °C) (Temporal)   Ht 5' 5\" (1.651 m)   Wt 170 lb (77.1 kg) Comment: In wheelchair, currently cannot physically stand to be weighed. SpO2 93%   BMI 28.29 kg/m²   Estimated body mass index is 28.29 kg/m² as calculated from the following:    Height as of this encounter: 5' 5\" (1.651 m). Weight as of this encounter: 170 lb (77.1 kg). General:  Luciano Singh is a 81y.o. year old female who appears her stated age. HEENT: Normocephalic atraumatic. Neck supple. Chest: regular rate; pulses equal  Abdomen: Soft nontender nondistended. Ext: DP and PT pulses 2+, good cap refill  Neuro    Mentation  Alert, following commands  Primarily Frisian speaking-baseline dementia    Cranial Nerves:   Pupils equal and reactive to light  Extraocular motion intact  Face and shrug symmetric  Tongue midline  No dysarthria  v1-3 sensation symmetric, masseter tone symmetric  Hearing symmetric    Sensation: Intact    Motor  L deltoid 5/5; R deltoid 5/5  L biceps 5/5; R biceps 5/5  L triceps 5/5; R triceps 5/5  L wrist extension 5/5; R wrist extension 5/5  L intrinsics 5/5; R intrinsics 5/5     L iliopsoas 5/5 , R iliopsoas 5/5  L quadriceps 5/5; R quadriceps 5/5  L Dorsiflexion 5/5; R dorsiflexion 5/5  L Plantarflexion 5/5; R plantarflexion 5/5  L EHL 5/5; R EHL 5/5    Reflexes  L Brachioradialis 2+/4; R brachioradialis 2+/4  L Biceps 2+/4; R Biceps 2+/4  L Triceps 2+/4; R Triceps 2+/4  L Patellar 2+/4: R Patellar 2+/4  L Achilles 2+/4; R Achilles 2+/4    hoffmans L: neg  hoffmans R: neg  Clonus L: neg  Clonus R: neg  Babinski L: neg  Babinski R: neg    Studies Review:     CT head 2/13/2023:  FINDINGS:   BRAIN/VENTRICLES: There has been interval decrease in volume of subarachnoid   hemorrhage within the left parietal lobe since the previous exam.  There is   no new hemorrhage identified. There is no mass effect or midline shift.   No   acute infarct is identified. There is mild diffuse cerebral volume loss. Periventricular hypoattenuation   is unchanged. There is no ventriculomegaly. ORBITS: Limited evaluation of the orbits is unremarkable. SINUSES: The paranasal sinuses and mastoid air cells are clear. SOFT TISSUES/SKULL:  No skull fracture is identified. CT head 2/4/2023:  FINDINGS:   BRAIN/VENTRICLES: There is a small amount of subarachnoid blood seen along   the left parietal lobe. This appears similar to the prior exam.  No new area   of intracranial hemorrhage is identified. There is no significant mass   effect or midline shift. The gray-white differentiation appears maintained. No evidence of hydrocephalus. There are areas of hypoattenuation in the   periventricular and subcortical white matter, which is nonspecific, but may   represent chronic microvasvular ischemic change. Atherosclerosis of the   intracranial vasculature is noted. ORBITS: The visualized portion of the orbits demonstrate no acute abnormality. SINUSES: The visualized paranasal sinuses and mastoid air cells demonstrate   no acute abnormality. SOFT TISSUES/SKULL:  No acute abnormality of the visualized skull or soft   tissues. Hospital records reviewed    Assessment and Plan:      1. Subarachnoid hemorrhage following injury, no loss of consciousness, subsequent encounter    2. Fall from standing, subsequent encounter    3. Long term current use of antithrombotics/antiplatelets          Plan: Patient doing well clinically, repeat CT showing interval improvement with no evidence of new bleeding. Okay to resume aspirin and Plavix. We will plan for follow-up in 4 to 6 weeks. Followup: Return in about 6 weeks (around 4/4/2023), or if symptoms worsen or fail to improve. Prescriptions Ordered:  No orders of the defined types were placed in this encounter.      Orders Placed:  No orders of the defined types were placed in this encounter. Electronically signed by [address] Gulf[address] StreetNATALIE CNP on 2/21/2023 at 11:02 AM    Please note that this chart was generated using voice recognition Dragon dictation software. Although every effort was made to ensure the accuracy of this automated transcription, some errors in transcription may have occurred.

## 2023-02-24 DIAGNOSIS — Z79.02 LONG TERM CURRENT USE OF ANTITHROMBOTICS/ANTIPLATELETS: ICD-10-CM

## 2023-02-24 DIAGNOSIS — S06.6X0D SUBARACHNOID HEMORRHAGE FOLLOWING INJURY, NO LOSS OF CONSCIOUSNESS, SUBSEQUENT ENCOUNTER: Primary | ICD-10-CM

## 2024-07-04 ENCOUNTER — HOSPITAL ENCOUNTER (INPATIENT)
Age: 89
LOS: 1 days | Discharge: ANOTHER HOSPITAL | DRG: 521 | End: 2024-07-04
Attending: EMERGENCY MEDICINE | Admitting: FAMILY MEDICINE
Payer: COMMERCIAL

## 2024-07-04 ENCOUNTER — APPOINTMENT (OUTPATIENT)
Dept: GENERAL RADIOLOGY | Age: 89
DRG: 521 | End: 2024-07-04
Payer: COMMERCIAL

## 2024-07-04 ENCOUNTER — HOSPITAL ENCOUNTER (INPATIENT)
Age: 89
LOS: 13 days | Discharge: NURSING, REHAB OR LONG-TERM CARE | DRG: 521 | End: 2024-07-17
Attending: STUDENT IN AN ORGANIZED HEALTH CARE EDUCATION/TRAINING PROGRAM | Admitting: STUDENT IN AN ORGANIZED HEALTH CARE EDUCATION/TRAINING PROGRAM
Payer: COMMERCIAL

## 2024-07-04 VITALS
HEART RATE: 64 BPM | DIASTOLIC BLOOD PRESSURE: 57 MMHG | OXYGEN SATURATION: 94 % | SYSTOLIC BLOOD PRESSURE: 108 MMHG | RESPIRATION RATE: 15 BRPM | TEMPERATURE: 98.1 F

## 2024-07-04 DIAGNOSIS — S72.001A CLOSED FRACTURE OF RIGHT HIP, INITIAL ENCOUNTER (HCC): Primary | ICD-10-CM

## 2024-07-04 DIAGNOSIS — R09.02 HYPOXEMIA: ICD-10-CM

## 2024-07-04 DIAGNOSIS — R06.02 SHORTNESS OF BREATH: Primary | ICD-10-CM

## 2024-07-04 PROBLEM — K21.9 GASTROESOPHAGEAL REFLUX DISEASE WITHOUT ESOPHAGITIS: Status: ACTIVE | Noted: 2023-09-24

## 2024-07-04 PROBLEM — I50.32 CHRONIC DIASTOLIC CONGESTIVE HEART FAILURE (HCC): Status: ACTIVE | Noted: 2023-09-24

## 2024-07-04 PROBLEM — G30.0 ALZHEIMER'S DISEASE WITH EARLY ONSET (HCC): Status: ACTIVE | Noted: 2023-09-24
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
## 2024-07-04 PROBLEM — E11.69 DM TYPE 2 WITH DIABETIC MIXED HYPERLIPIDEMIA (HCC): Status: ACTIVE | Noted: 2023-09-23

## 2024-07-04 PROBLEM — S42.211A FX HUMERAL NECK, RIGHT, CLOSED, INITIAL ENCOUNTER: Status: ACTIVE | Noted: 2024-07-04

## 2024-07-04 PROBLEM — S42.291A HUMERAL HEAD FRACTURE, RIGHT, CLOSED, INITIAL ENCOUNTER: Status: ACTIVE | Noted: 2024-07-04

## 2024-07-04 PROBLEM — E11.65 TYPE 2 DIABETES MELLITUS WITH HYPERGLYCEMIA (HCC): Status: ACTIVE | Noted: 2023-09-23

## 2024-07-04 PROBLEM — I10 ESSENTIAL HYPERTENSION: Status: ACTIVE | Noted: 2023-09-23

## 2024-07-04 PROBLEM — F02.80 ALZHEIMER'S DISEASE WITH EARLY ONSET (HCC): Status: ACTIVE | Noted: 2023-09-24

## 2024-07-04 PROBLEM — M51.36 DDD (DEGENERATIVE DISC DISEASE), LUMBAR: Status: ACTIVE | Noted: 2023-09-24

## 2024-07-04 PROBLEM — F41.1 GAD (GENERALIZED ANXIETY DISORDER): Status: ACTIVE | Noted: 2023-09-24

## 2024-07-04 PROBLEM — E78.2 DM TYPE 2 WITH DIABETIC MIXED HYPERLIPIDEMIA (HCC): Status: ACTIVE | Noted: 2023-09-23

## 2024-07-04 PROBLEM — I65.23 OCCLUSION AND STENOSIS OF BILATERAL CAROTID ARTERIES: Status: ACTIVE | Noted: 2023-09-24

## 2024-07-04 PROBLEM — E78.5 HYPERLIPIDEMIA: Status: ACTIVE | Noted: 2023-09-24

## 2024-07-04 PROBLEM — I25.10 CORONARY ARTERY DISEASE INVOLVING NATIVE CORONARY ARTERY OF NATIVE HEART WITHOUT ANGINA PECTORIS: Status: ACTIVE | Noted: 2023-09-24

## 2024-07-04 LAB
ALBUMIN SERPL-MCNC: 3.9 G/DL (ref 3.5–5.2)
ALBUMIN/GLOB SERPL: 1.2 {RATIO} (ref 1–2.5)
ALP SERPL-CCNC: 95 U/L (ref 35–104)
ALT SERPL-CCNC: 5 U/L (ref 5–33)
ANION GAP SERPL CALCULATED.3IONS-SCNC: 15 MMOL/L (ref 9–17)
AST SERPL-CCNC: 29 U/L
BASOPHILS # BLD: 0.1 K/UL (ref 0–0.2)
BASOPHILS NFR BLD: 1 % (ref 0–2)
BILIRUB SERPL-MCNC: 0.4 MG/DL (ref 0.3–1.2)
BNP SERPL-MCNC: 547 PG/ML
BUN SERPL-MCNC: 19 MG/DL (ref 8–23)
CALCIUM SERPL-MCNC: 8.8 MG/DL (ref 8.6–10.4)
CHLORIDE SERPL-SCNC: 97 MMOL/L (ref 98–107)
CO2 SERPL-SCNC: 21 MMOL/L (ref 20–31)
CREAT SERPL-MCNC: 1.1 MG/DL (ref 0.5–0.9)
EOSINOPHIL # BLD: 0.2 K/UL (ref 0–0.4)
EOSINOPHILS RELATIVE PERCENT: 3 % (ref 1–4)
ERYTHROCYTE [DISTWIDTH] IN BLOOD BY AUTOMATED COUNT: 17.3 % (ref 12.5–15.4)
GFR, ESTIMATED: 49 ML/MIN/1.73M2
GLUCOSE BLD-MCNC: 117 MG/DL (ref 65–105)
GLUCOSE SERPL-MCNC: 278 MG/DL (ref 70–99)
HCT VFR BLD AUTO: 40.2 % (ref 36–46)
HGB BLD-MCNC: 13.4 G/DL (ref 12–16)
LYMPHOCYTES NFR BLD: 0.6 K/UL (ref 1–4.8)
LYMPHOCYTES RELATIVE PERCENT: 7 % (ref 24–44)
MAGNESIUM SERPL-MCNC: 2 MG/DL (ref 1.6–2.6)
MCH RBC QN AUTO: 27.4 PG (ref 26–34)
MCHC RBC AUTO-ENTMCNC: 33.3 G/DL (ref 31–37)
MCV RBC AUTO: 82.2 FL (ref 80–100)
MONOCYTES NFR BLD: 0.6 K/UL (ref 0.1–1.2)
MONOCYTES NFR BLD: 7 % (ref 2–11)
NEUTROPHILS NFR BLD: 82 % (ref 36–66)
NEUTS SEG NFR BLD: 7.3 K/UL (ref 1.8–7.7)
PLATELET # BLD AUTO: 256 K/UL (ref 140–450)
PMV BLD AUTO: 8.6 FL (ref 6–12)
POTASSIUM SERPL-SCNC: 4.5 MMOL/L (ref 3.7–5.3)
PROT SERPL-MCNC: 7.2 G/DL (ref 6.4–8.3)
RBC # BLD AUTO: 4.89 M/UL (ref 4–5.2)
SODIUM SERPL-SCNC: 133 MMOL/L (ref 135–144)
TROPONIN I SERPL HS-MCNC: 21 NG/L (ref 0–14)
TROPONIN I SERPL HS-MCNC: 22 NG/L (ref 0–14)
WBC OTHER # BLD: 8.8 K/UL (ref 3.5–11)

## 2024-07-04 PROCEDURE — 73502 X-RAY EXAM HIP UNI 2-3 VIEWS: CPT

## 2024-07-04 PROCEDURE — 80053 COMPREHEN METABOLIC PANEL: CPT

## 2024-07-04 PROCEDURE — 83880 ASSAY OF NATRIURETIC PEPTIDE: CPT

## 2024-07-04 PROCEDURE — 99223 1ST HOSP IP/OBS HIGH 75: CPT | Performed by: STUDENT IN AN ORGANIZED HEALTH CARE EDUCATION/TRAINING PROGRAM

## 2024-07-04 PROCEDURE — 82947 ASSAY GLUCOSE BLOOD QUANT: CPT

## 2024-07-04 PROCEDURE — 36415 COLL VENOUS BLD VENIPUNCTURE: CPT

## 2024-07-04 PROCEDURE — 83735 ASSAY OF MAGNESIUM: CPT

## 2024-07-04 PROCEDURE — 2060000000 HC ICU INTERMEDIATE R&B

## 2024-07-04 PROCEDURE — 6370000000 HC RX 637 (ALT 250 FOR IP): Performed by: CLINICAL NURSE SPECIALIST

## 2024-07-04 PROCEDURE — 84484 ASSAY OF TROPONIN QUANT: CPT

## 2024-07-04 PROCEDURE — 71045 X-RAY EXAM CHEST 1 VIEW: CPT

## 2024-07-04 PROCEDURE — 2580000003 HC RX 258: Performed by: CLINICAL NURSE SPECIALIST

## 2024-07-04 PROCEDURE — 2580000003 HC RX 258

## 2024-07-04 PROCEDURE — 6370000000 HC RX 637 (ALT 250 FOR IP)

## 2024-07-04 PROCEDURE — 1200000000 HC SEMI PRIVATE

## 2024-07-04 PROCEDURE — 85025 COMPLETE CBC W/AUTO DIFF WBC: CPT

## 2024-07-04 PROCEDURE — 93005 ELECTROCARDIOGRAM TRACING: CPT | Performed by: EMERGENCY MEDICINE

## 2024-07-04 PROCEDURE — 99285 EMERGENCY DEPT VISIT HI MDM: CPT

## 2024-07-04 RX ORDER — ALENDRONATE SODIUM 70 MG/1
70 TABLET ORAL WEEKLY
Status: DISCONTINUED | OUTPATIENT
Start: 2024-07-04 | End: 2024-07-04

## 2024-07-04 RX ORDER — LEVOTHYROXINE SODIUM 0.1 MG/1
100 TABLET ORAL
Status: DISCONTINUED | OUTPATIENT
Start: 2024-07-05 | End: 2024-07-17 | Stop reason: HOSPADM

## 2024-07-04 RX ORDER — GLUCAGON 1 MG/ML
1 KIT INJECTION PRN
Status: DISCONTINUED | OUTPATIENT
Start: 2024-07-04 | End: 2024-07-17 | Stop reason: HOSPADM

## 2024-07-04 RX ORDER — EZETIMIBE 10 MG/1
10 TABLET ORAL DAILY
Status: DISCONTINUED | OUTPATIENT
Start: 2024-07-05 | End: 2024-07-17 | Stop reason: HOSPADM

## 2024-07-04 RX ORDER — RIVASTIGMINE TARTRATE 3 MG/1
3 CAPSULE ORAL 2 TIMES DAILY
Status: DISCONTINUED | OUTPATIENT
Start: 2024-07-04 | End: 2024-07-17 | Stop reason: HOSPADM

## 2024-07-04 RX ORDER — SODIUM CHLORIDE 0.9 % (FLUSH) 0.9 %
5-40 SYRINGE (ML) INJECTION PRN
Status: DISCONTINUED | OUTPATIENT
Start: 2024-07-04 | End: 2024-07-04 | Stop reason: HOSPADM

## 2024-07-04 RX ORDER — GLIPIZIDE 5 MG/1
10 TABLET ORAL 2 TIMES DAILY
Status: DISCONTINUED | OUTPATIENT
Start: 2024-07-04 | End: 2024-07-04 | Stop reason: HOSPADM

## 2024-07-04 RX ORDER — TROSPIUM CHLORIDE 20 MG/1
20 TABLET, FILM COATED ORAL NIGHTLY
Status: DISCONTINUED | OUTPATIENT
Start: 2024-07-04 | End: 2024-07-08

## 2024-07-04 RX ORDER — FUROSEMIDE 20 MG/1
20 TABLET ORAL
Status: DISCONTINUED | OUTPATIENT
Start: 2024-07-05 | End: 2024-07-04

## 2024-07-04 RX ORDER — POTASSIUM CHLORIDE 7.45 MG/ML
10 INJECTION INTRAVENOUS PRN
Status: DISCONTINUED | OUTPATIENT
Start: 2024-07-04 | End: 2024-07-17 | Stop reason: HOSPADM

## 2024-07-04 RX ORDER — SODIUM CHLORIDE 0.9 % (FLUSH) 0.9 %
5-40 SYRINGE (ML) INJECTION EVERY 12 HOURS SCHEDULED
Status: DISCONTINUED | OUTPATIENT
Start: 2024-07-04 | End: 2024-07-17 | Stop reason: HOSPADM

## 2024-07-04 RX ORDER — ONDANSETRON 2 MG/ML
4 INJECTION INTRAMUSCULAR; INTRAVENOUS EVERY 6 HOURS PRN
Status: DISCONTINUED | OUTPATIENT
Start: 2024-07-04 | End: 2024-07-17 | Stop reason: HOSPADM

## 2024-07-04 RX ORDER — RIVASTIGMINE TARTRATE 1.5 MG/1
3 CAPSULE ORAL 2 TIMES DAILY
Status: DISCONTINUED | OUTPATIENT
Start: 2024-07-04 | End: 2024-07-04 | Stop reason: HOSPADM

## 2024-07-04 RX ORDER — POLYETHYLENE GLYCOL 3350 17 G/17G
17 POWDER, FOR SOLUTION ORAL DAILY PRN
Status: DISCONTINUED | OUTPATIENT
Start: 2024-07-04 | End: 2024-07-04 | Stop reason: HOSPADM

## 2024-07-04 RX ORDER — ENOXAPARIN SODIUM 100 MG/ML
40 INJECTION SUBCUTANEOUS DAILY
Status: DISCONTINUED | OUTPATIENT
Start: 2024-07-05 | End: 2024-07-05

## 2024-07-04 RX ORDER — BUSPIRONE HYDROCHLORIDE 5 MG/1
5 TABLET ORAL 2 TIMES DAILY
Status: DISCONTINUED | OUTPATIENT
Start: 2024-07-04 | End: 2024-07-04 | Stop reason: HOSPADM

## 2024-07-04 RX ORDER — ALOGLIPTIN 6.25 MG/1
6.25 TABLET, FILM COATED ORAL DAILY
Status: DISCONTINUED | OUTPATIENT
Start: 2024-07-05 | End: 2024-07-05

## 2024-07-04 RX ORDER — POTASSIUM CHLORIDE 7.45 MG/ML
10 INJECTION INTRAVENOUS PRN
Status: DISCONTINUED | OUTPATIENT
Start: 2024-07-04 | End: 2024-07-04 | Stop reason: HOSPADM

## 2024-07-04 RX ORDER — CLOPIDOGREL BISULFATE 75 MG/1
75 TABLET ORAL DAILY
Status: DISCONTINUED | OUTPATIENT
Start: 2024-07-05 | End: 2024-07-05

## 2024-07-04 RX ORDER — SODIUM CHLORIDE 0.9 % (FLUSH) 0.9 %
10 SYRINGE (ML) INJECTION PRN
Status: DISCONTINUED | OUTPATIENT
Start: 2024-07-04 | End: 2024-07-17 | Stop reason: HOSPADM

## 2024-07-04 RX ORDER — BUSPIRONE HYDROCHLORIDE 5 MG/1
5 TABLET ORAL 2 TIMES DAILY
Status: DISCONTINUED | OUTPATIENT
Start: 2024-07-04 | End: 2024-07-17 | Stop reason: HOSPADM

## 2024-07-04 RX ORDER — INSULIN LISPRO 100 [IU]/ML
0-8 INJECTION, SOLUTION INTRAVENOUS; SUBCUTANEOUS EVERY 6 HOURS
Status: DISCONTINUED | OUTPATIENT
Start: 2024-07-04 | End: 2024-07-06

## 2024-07-04 RX ORDER — DEXTROSE MONOHYDRATE 100 MG/ML
INJECTION, SOLUTION INTRAVENOUS CONTINUOUS PRN
Status: DISCONTINUED | OUTPATIENT
Start: 2024-07-04 | End: 2024-07-04 | Stop reason: HOSPADM

## 2024-07-04 RX ORDER — ACETAMINOPHEN 325 MG/1
650 TABLET ORAL EVERY 6 HOURS PRN
Status: DISCONTINUED | OUTPATIENT
Start: 2024-07-04 | End: 2024-07-04 | Stop reason: HOSPADM

## 2024-07-04 RX ORDER — ATORVASTATIN CALCIUM 40 MG/1
40 TABLET, FILM COATED ORAL DAILY
Status: DISCONTINUED | OUTPATIENT
Start: 2024-07-05 | End: 2024-07-17 | Stop reason: HOSPADM

## 2024-07-04 RX ORDER — POTASSIUM CHLORIDE 20 MEQ/1
40 TABLET, EXTENDED RELEASE ORAL PRN
Status: DISCONTINUED | OUTPATIENT
Start: 2024-07-04 | End: 2024-07-04 | Stop reason: HOSPADM

## 2024-07-04 RX ORDER — ACETAMINOPHEN 650 MG/1
650 SUPPOSITORY RECTAL EVERY 6 HOURS PRN
Status: DISCONTINUED | OUTPATIENT
Start: 2024-07-04 | End: 2024-07-17 | Stop reason: HOSPADM

## 2024-07-04 RX ORDER — CARBIDOPA AND LEVODOPA 25; 100 MG/1; MG/1
1 TABLET, EXTENDED RELEASE ORAL 2 TIMES DAILY
Status: DISCONTINUED | OUTPATIENT
Start: 2024-07-04 | End: 2024-07-17 | Stop reason: HOSPADM

## 2024-07-04 RX ORDER — ONDANSETRON 4 MG/1
4 TABLET, ORALLY DISINTEGRATING ORAL EVERY 8 HOURS PRN
Status: DISCONTINUED | OUTPATIENT
Start: 2024-07-04 | End: 2024-07-04 | Stop reason: HOSPADM

## 2024-07-04 RX ORDER — SODIUM CHLORIDE 0.9 % (FLUSH) 0.9 %
5-40 SYRINGE (ML) INJECTION EVERY 12 HOURS SCHEDULED
Status: DISCONTINUED | OUTPATIENT
Start: 2024-07-04 | End: 2024-07-04 | Stop reason: HOSPADM

## 2024-07-04 RX ORDER — INSULIN LISPRO 100 [IU]/ML
0-8 INJECTION, SOLUTION INTRAVENOUS; SUBCUTANEOUS
Status: DISCONTINUED | OUTPATIENT
Start: 2024-07-05 | End: 2024-07-04

## 2024-07-04 RX ORDER — POLYETHYLENE GLYCOL 3350 17 G/17G
17 POWDER, FOR SOLUTION ORAL DAILY PRN
Status: DISCONTINUED | OUTPATIENT
Start: 2024-07-04 | End: 2024-07-17 | Stop reason: HOSPADM

## 2024-07-04 RX ORDER — INSULIN LISPRO 100 [IU]/ML
0-4 INJECTION, SOLUTION INTRAVENOUS; SUBCUTANEOUS NIGHTLY
Status: DISCONTINUED | OUTPATIENT
Start: 2024-07-04 | End: 2024-07-04 | Stop reason: SDUPTHER

## 2024-07-04 RX ORDER — POTASSIUM CHLORIDE 20 MEQ/1
40 TABLET, EXTENDED RELEASE ORAL PRN
Status: DISCONTINUED | OUTPATIENT
Start: 2024-07-04 | End: 2024-07-17 | Stop reason: HOSPADM

## 2024-07-04 RX ORDER — SODIUM CHLORIDE 9 MG/ML
INJECTION, SOLUTION INTRAVENOUS PRN
Status: DISCONTINUED | OUTPATIENT
Start: 2024-07-04 | End: 2024-07-04 | Stop reason: HOSPADM

## 2024-07-04 RX ORDER — MAGNESIUM SULFATE IN WATER 40 MG/ML
2000 INJECTION, SOLUTION INTRAVENOUS PRN
Status: DISCONTINUED | OUTPATIENT
Start: 2024-07-04 | End: 2024-07-04 | Stop reason: HOSPADM

## 2024-07-04 RX ORDER — INSULIN LISPRO 100 [IU]/ML
0-4 INJECTION, SOLUTION INTRAVENOUS; SUBCUTANEOUS NIGHTLY
Status: DISCONTINUED | OUTPATIENT
Start: 2024-07-05 | End: 2024-07-04 | Stop reason: SDUPTHER

## 2024-07-04 RX ORDER — ONDANSETRON 2 MG/ML
4 INJECTION INTRAMUSCULAR; INTRAVENOUS EVERY 6 HOURS PRN
Status: DISCONTINUED | OUTPATIENT
Start: 2024-07-04 | End: 2024-07-04 | Stop reason: HOSPADM

## 2024-07-04 RX ORDER — UREA 10 %
1000 LOTION (ML) TOPICAL DAILY
Status: DISCONTINUED | OUTPATIENT
Start: 2024-07-05 | End: 2024-07-17 | Stop reason: HOSPADM

## 2024-07-04 RX ORDER — ACETAMINOPHEN 650 MG/1
650 SUPPOSITORY RECTAL EVERY 6 HOURS PRN
Status: DISCONTINUED | OUTPATIENT
Start: 2024-07-04 | End: 2024-07-04 | Stop reason: HOSPADM

## 2024-07-04 RX ORDER — ONDANSETRON 4 MG/1
4 TABLET, ORALLY DISINTEGRATING ORAL EVERY 8 HOURS PRN
Status: DISCONTINUED | OUTPATIENT
Start: 2024-07-04 | End: 2024-07-17 | Stop reason: HOSPADM

## 2024-07-04 RX ORDER — ACETAMINOPHEN 325 MG/1
650 TABLET ORAL EVERY 6 HOURS PRN
Status: DISCONTINUED | OUTPATIENT
Start: 2024-07-04 | End: 2024-07-17 | Stop reason: HOSPADM

## 2024-07-04 RX ORDER — GLIPIZIDE 10 MG/1
10 TABLET ORAL 2 TIMES DAILY
Status: DISCONTINUED | OUTPATIENT
Start: 2024-07-04 | End: 2024-07-05

## 2024-07-04 RX ORDER — DEXTROSE MONOHYDRATE 100 MG/ML
INJECTION, SOLUTION INTRAVENOUS CONTINUOUS PRN
Status: DISCONTINUED | OUTPATIENT
Start: 2024-07-04 | End: 2024-07-17 | Stop reason: HOSPADM

## 2024-07-04 RX ORDER — MAGNESIUM SULFATE 1 G/100ML
1000 INJECTION INTRAVENOUS PRN
Status: DISCONTINUED | OUTPATIENT
Start: 2024-07-04 | End: 2024-07-17 | Stop reason: HOSPADM

## 2024-07-04 RX ORDER — GLUCAGON 1 MG/ML
1 KIT INJECTION PRN
Status: DISCONTINUED | OUTPATIENT
Start: 2024-07-04 | End: 2024-07-04 | Stop reason: HOSPADM

## 2024-07-04 RX ORDER — SODIUM CHLORIDE 9 MG/ML
INJECTION, SOLUTION INTRAVENOUS PRN
Status: DISCONTINUED | OUTPATIENT
Start: 2024-07-04 | End: 2024-07-17 | Stop reason: HOSPADM

## 2024-07-04 RX ORDER — ASPIRIN 81 MG/1
81 TABLET ORAL DAILY
Status: DISCONTINUED | OUTPATIENT
Start: 2024-07-05 | End: 2024-07-05

## 2024-07-04 RX ORDER — CARBIDOPA AND LEVODOPA 25; 100 MG/1; MG/1
1 TABLET, EXTENDED RELEASE ORAL 2 TIMES DAILY
Status: DISCONTINUED | OUTPATIENT
Start: 2024-07-04 | End: 2024-07-04 | Stop reason: HOSPADM

## 2024-07-04 RX ORDER — DOCUSATE SODIUM 100 MG/1
100 CAPSULE, LIQUID FILLED ORAL DAILY
Status: DISCONTINUED | OUTPATIENT
Start: 2024-07-05 | End: 2024-07-17 | Stop reason: HOSPADM

## 2024-07-04 RX ORDER — HYDROCODONE BITARTRATE AND ACETAMINOPHEN 5; 325 MG/1; MG/1
1 TABLET ORAL EVERY 6 HOURS PRN
Status: DISCONTINUED | OUTPATIENT
Start: 2024-07-04 | End: 2024-07-04 | Stop reason: HOSPADM

## 2024-07-04 RX ADMIN — TROSPIUM CHLORIDE 20 MG: 20 TABLET, FILM COATED ORAL at 23:23

## 2024-07-04 RX ADMIN — BUSPIRONE HYDROCHLORIDE 5 MG: 5 TABLET ORAL at 21:12

## 2024-07-04 RX ADMIN — METFORMIN HYDROCHLORIDE 750 MG: 500 TABLET ORAL at 17:36

## 2024-07-04 RX ADMIN — ACETAMINOPHEN 650 MG: 325 TABLET ORAL at 23:25

## 2024-07-04 RX ADMIN — SODIUM CHLORIDE, PRESERVATIVE FREE 10 ML: 5 INJECTION INTRAVENOUS at 21:13

## 2024-07-04 RX ADMIN — SODIUM CHLORIDE, PRESERVATIVE FREE 10 ML: 5 INJECTION INTRAVENOUS at 23:19

## 2024-07-04 RX ADMIN — RIVASTIGMINE TARTRATE 3 MG: 1.5 CAPSULE ORAL at 21:12

## 2024-07-04 RX ADMIN — HYDROCODONE BITARTRATE AND ACETAMINOPHEN 1 TABLET: 5; 325 TABLET ORAL at 17:37

## 2024-07-04 RX ADMIN — METOPROLOL TARTRATE 25 MG: 25 TABLET, FILM COATED ORAL at 21:13

## 2024-07-04 RX ADMIN — GLIPIZIDE 10 MG: 5 TABLET ORAL at 21:12

## 2024-07-04 RX ADMIN — CARBIDOPA AND LEVODOPA 1 TABLET: 25; 100 TABLET, EXTENDED RELEASE ORAL at 21:13

## 2024-07-04 ASSESSMENT — PAIN SCALES - GENERAL: PAINLEVEL_OUTOF10: 7

## 2024-07-04 ASSESSMENT — ENCOUNTER SYMPTOMS
SHORTNESS OF BREATH: 1
WHEEZING: 0
VOMITING: 1
COUGH: 0
COLOR CHANGE: 0
TROUBLE SWALLOWING: 0
CHEST TIGHTNESS: 0
NAUSEA: 0
ABDOMINAL PAIN: 0
RHINORRHEA: 0

## 2024-07-04 ASSESSMENT — PAIN DESCRIPTION - ORIENTATION
ORIENTATION: RIGHT
ORIENTATION: RIGHT

## 2024-07-04 ASSESSMENT — PAIN DESCRIPTION - LOCATION
LOCATION: HIP
LOCATION: HIP

## 2024-07-04 ASSESSMENT — PAIN - FUNCTIONAL ASSESSMENT: PAIN_FUNCTIONAL_ASSESSMENT: PREVENTS OR INTERFERES WITH MANY ACTIVE NOT PASSIVE ACTIVITIES

## 2024-07-04 ASSESSMENT — PAIN DESCRIPTION - DESCRIPTORS: DESCRIPTORS: ACHING;SORE;DISCOMFORT

## 2024-07-04 NOTE — H&P
[facility] Family Medicine Residency  Inpatient Service     HISTORY AND PHYSICAL EXAMINATION            Date:   7/4/2024  Patient name:  Eliz Macias  Date of admission:  7/4/2024 10:30 AM  MRN:   [id number]  Account:  [id number]  YOB: 1935  PCP:    Lee Sainz MD  Room:   [address] [place] [address]  Code Status:    Full Code  hPOA Name:   Son Deepika Macias [phone]    History Obtained From:     Persian speaker, obtained by  and Son at bedside    History of Present Illness:     Eliz Macias is a 88 y.o.  female, PMHx colon DM2, coronary artery disease with a heart attack greater than 40 years ago, hypertension, hyperlipidemiawho presents to the ER after she had a mechanical fall on 7/3 around 6 PM in her living room admitted right femoral neck fracture found on x-ray.    Patient normally uses a walker and while holding a pie fell on her right side. She denied hitting her head, having any pain or discomfort in any other area besides hip, or loss of consciousness. Patient is afebrile with normal vitals and saturating 94% on 3 L.  She is not on oxygen at home.  She is only having mild pain in her right hip.  She was unable to bear weight on her right leg in the morning and was brought to the hospital. Patient can feel and wiggle the toes on her right leg and does not have any numbness or tingling.    In ED significant labs done included: Troponin 21, , normal WBCs, hemoglobin 13.4, slightly low but stable sodium at 133, slightly increased but stable creatinine at 1.1 with baseline around 0.9, glucose 278 and stable, and an EKG that showed sinus rhythm with Q waves in inferior leads and abnormal R wave progression in the anterior leads. Chest CXR showed hypoventilation with probably basilar atelectasis or chronic change. No clear vascular congestion, consolidation or large pleural effusion.   Social History:     Social History     Socioeconomic History    Marital  large pleural effusion.       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 No clear cut vascular congestion, consolidation or large pleural effusion  -Patient currently afebrile with stable vitals saturating at 93% 3 L nasal cannula (not normally on oxygen at home)  - wean O2 as tolerated  -Trop 21, repeat pending  -Ordered Norco 5-325 q6 hours for pain  -Orthopedic surgery consulted, Dr. Ramez Marvin requested patient to be transferred to [facility] for higher order of care because no Ortho surgeon available onsite today or tomorrow.  - Patient will be transferred to [facility] and will be accepted by Dr. Pamella Bell in medicine on the stepdown unit.      Type 2 diabetes  Coronary artery disease with past history of MI greater than 40 years ago  Hypertension  Hyperlipidemia  Dementia without behavioral disturbance  Alzheimer's disease   Degenerative disc disease   -will give nightly medications prior to transfer which include rivastigmine, lopressor, metformin, glipizide, Buspar    Anticoagulation: SCD, no AC because surgery upcoming    Patient is admitted as  inpatient to [facility] but will be transferred to medicine in the step down unit    Patient was discussed with Rodrigo Reynoso,

## 2024-07-04 NOTE — PROGRESS NOTES
Patient admitted to floor; / used, with assistance from pt's son as well. VS, admission, assessment, plan of care, surgery, education, medication, hx, diet, call light, safety and room orientation all discussed via  and pt's son, with Dr Felton, Dr Rehman and writer; all questions answered to satisfaction.  Son/patient updated about pt transfer to [facility] for surgery.

## 2024-07-04 NOTE — CARE COORDINATION
JOHANAFN to transport by stretcher to [facility] @ 9pm  to  405 bed 1.     Rn Report  [phone]    Packet     H&P

## 2024-07-04 NOTE — ED PROVIDER NOTES
[facility] Emergency Department  [address]  [facility] [place] [facility] [address]  Phone: [phone]  Fax: [phone]      Pt Name: Eliz Macias  MRN:[id number]  Birthdate 1935  Date of evaluation: 7/4/2024      CHIEF COMPLAINT       Chief Complaint   Patient presents with    Fall       HISTORY OF PRESENT ILLNESS    88-year-old female presents to the emergency department today complaining of right-sided hip pain.  Yesterday she had a mechanical fall.  She typically walks with a walker but was walking with her walker as well as a pie in her right hand when she fell.  She landed on her right side and injured her right hip.  Since then she has been nonweightbearing on her right hip.  She did not strike her head.  There was no loss of consciousness.  This morning again they tried to get her up to ambulate her and she could not and therefore she presents here by EMS.  Is been no other contemporaneous evaluation or management of the symptoms prior to arrival.  Patient exclusively speaks Romansh.  We have attempted to use her  here which patient's at the does not understand to interact with the work cannot interact with.  Her son is at bedside and is fluent in English and is used as her  here.     REVIEW OF SYSTEMS     Review of Systems   All other systems reviewed and are negative.        PAST MEDICAL HISTORY    has no past medical history on file.    SURGICAL HISTORY      has no past surgical history on file.    CURRENT MEDICATIONS       Previous Medications    ALENDRONATE (FOSAMAX) 70 MG TABLET    Take 70 mg by mouth once a week    ASPIRIN 81 MG EC TABLET    Take 81 mg by mouth daily    ATORVASTATIN (LIPITOR) 40 MG TABLET    Take 40 mg by mouth daily    BUSPIRONE (BUSPAR) 5 MG TABLET    Take 5 mg by mouth 2 times daily    CARBIDOPA-LEVODOPA (SINEMET CR)  MG PER EXTENDED RELEASE TABLET    Take  tablets by mouth 2 times daily    CLOPIDOGREL (PLAVIX) 75 MG TABLET    Take 1

## 2024-07-04 NOTE — DISCHARGE SUMMARY
[facility] Family Medicine Residency  Inpatient Service    Discharge Summary     Patient ID: Eliz Macias  :  1935   MRN: [id number]     ACCOUNT:  [id number]   Patient's PCP: Lee Sainz MD  Admit Date: 2024   Discharge Date: 24 (Transfer to [facility] to R femoral neck acute slightly displaced fracture)  Length of Stay: 0  Code Status:  Full Code  Admitting Physician: Rodrigo Reynoso MD  Discharge Physician: Rodrigo Reynoso MD     Active Discharge Diagnoses:     Hospital Problem Lists:  Principal Problem:    Humeral head fracture, right, closed, initial encounter  Resolved Problems:    * No resolved hospital problems. *      Admission Condition:  good     Discharged Condition: good    Hospital Stay:     Hospital Course:      Eliz Macias is a 88 y.o.  female, PMHx colon DM2, coronary artery disease with a heart attack greater than 40 years ago, hypertension, hyperlipidemiawho presents to the ER after she had a mechanical fall on 7/3 around 6 PM in her living room admitted right femoral neck fracture found on x-ray.     In ED patient was started on O2 due to desaturation, Chest CXR showed hypoventilation with probably basilar atelectasis or chronic change. No clear vascular congestion, consolidation or large pleural. Xray hip showed Acute slightly displaced right femoral neck fracture.     While hospitalized Ortho was consulted and provided with Norco 5-325 to control her pain. Orthopedic surgery Dr. Ramez Marvin requested patient to be transferred to [facility] for higher order of care because no Ortho was available onsite at [facility] today or tomorrow. Patient will be transferred to [facility] and will be accepted by Dr. Pamella Bell in medicine on the stepdown unit.  Orthopedic surgery will be consulted at [facility].    Significant therapeutic interventions: CXR, ortho consult, O2 treatment, pain management    Significant Diagnostic  Studies:   Labs:  Hematology:  Recent Labs     07/04/24  1053   WBC 8.8   RBC 4.89   HGB 13.4   HCT 40.2   MCV 82.2   MCH 27.4   MCHC 33.3   RDW 17.3*      MPV 8.6     Chemistry:  Recent Labs     07/04/24  1053   *   K 4.5   CL 97*   CO2 21   GLUCOSE 278*   BUN 19   CREATININE 1.1*   MG 2.0   ANIONGAP 15   LABGLOM 49*   CALCIUM 8.8   PROBNP 547*   TROPHS 21*     Recent Labs     07/04/24  1053   AST 29   ALT 5   ALKPHOS 95   BILITOT 0.4     ABG:  Lab Results   Component Value Date/Time    FIO2 INFORMATION NOT PROVIDED 02/03/2023 10:27 PM     No results found for: \"SPECIAL\"  No results found for: \"CULTURE\"    Radiology:  XR HIP 2-3 VW W PELVIS RIGHT    Result Date: 7/4/2024  Acute slightly displaced right femoral neck fracture.     XR CHEST PORTABLE    Result Date: 7/4/2024  Hypoventilation with probable basilar atelectasis or chronic change. No clear cut vascular congestion, consolidation or large pleural effusion.       Consultations:    Consults:     Final Specialist Recommendations/Findings:   IP CONSULT TO ORTHOPEDIC SURGERY      The patient was seen and examined on day of discharge and this discharge summary is in conjunction with any daily progress note from day of discharge.    Immunization History   Administered Date(s) Administered    COVID-19, PFIZER PURPLE top, DILUTE for use, (age 12 y+), 30mcg/0.3mL 01/28/2021, 02/18/2021, 09/29/2021        Discharge plan:     Disposition: Transfer to [facility] for R femoral neck fracture surgery    Discharge Medications:      Medication List        ASK your doctor about these medications      alendronate 70 MG tablet  Commonly known as: FOSAMAX     aspirin 81 MG EC tablet     atorvastatin 40 MG tablet  Commonly known as: LIPITOR     busPIRone 5 MG tablet  Commonly known as: BUSPAR     carbidopa-levodopa  MG per extended release tablet  Commonly known as: SINEMET CR     clopidogrel 75 MG tablet  Commonly known as: Plavix  Take 1 tablet by mouth

## 2024-07-05 ENCOUNTER — APPOINTMENT (OUTPATIENT)
Dept: GENERAL RADIOLOGY | Age: 89
DRG: 521 | End: 2024-07-05
Attending: STUDENT IN AN ORGANIZED HEALTH CARE EDUCATION/TRAINING PROGRAM
Payer: COMMERCIAL

## 2024-07-05 ENCOUNTER — ANESTHESIA (OUTPATIENT)
Dept: OPERATING ROOM | Age: 89
End: 2024-07-05
Payer: COMMERCIAL

## 2024-07-05 ENCOUNTER — APPOINTMENT (OUTPATIENT)
Age: 89
DRG: 521 | End: 2024-07-05
Attending: STUDENT IN AN ORGANIZED HEALTH CARE EDUCATION/TRAINING PROGRAM
Payer: COMMERCIAL

## 2024-07-05 ENCOUNTER — APPOINTMENT (OUTPATIENT)
Dept: CT IMAGING | Age: 89
DRG: 521 | End: 2024-07-05
Attending: STUDENT IN AN ORGANIZED HEALTH CARE EDUCATION/TRAINING PROGRAM
Payer: COMMERCIAL

## 2024-07-05 ENCOUNTER — ANESTHESIA EVENT (OUTPATIENT)
Dept: OPERATING ROOM | Age: 89
End: 2024-07-05
Payer: COMMERCIAL

## 2024-07-05 PROBLEM — R06.02 SHORTNESS OF BREATH: Status: ACTIVE | Noted: 2024-07-05

## 2024-07-05 PROBLEM — Z01.818 PREOPERATIVE CLEARANCE: Status: ACTIVE | Noted: 2024-07-05

## 2024-07-05 LAB
25(OH)D3 SERPL-MCNC: 41.5 NG/ML (ref 30–100)
25(OH)D3 SERPL-MCNC: 47.1 NG/ML (ref 30–100)
ALBUMIN SERPL-MCNC: 3.8 G/DL (ref 3.5–5.2)
ALBUMIN/GLOB SERPL: ABNORMAL {RATIO} (ref 1–2.5)
ALP SERPL-CCNC: 76 U/L (ref 35–104)
ALT SERPL-CCNC: <5 U/L (ref 10–35)
ANION GAP SERPL CALCULATED.3IONS-SCNC: 14 MMOL/L (ref 9–16)
AST SERPL-CCNC: 30 U/L (ref 10–35)
B PARAP IS1001 DNA NPH QL NAA+NON-PROBE: NOT DETECTED
B PERT DNA SPEC QL NAA+PROBE: NOT DETECTED
BASOPHILS # BLD: 0.07 K/UL (ref 0–0.2)
BASOPHILS NFR BLD: 1 % (ref 0–2)
BILIRUB SERPL-MCNC: 0.3 MG/DL (ref 0–1.2)
BUN SERPL-MCNC: 23 MG/DL (ref 8–23)
C PNEUM DNA NPH QL NAA+NON-PROBE: NOT DETECTED
CALCIUM SERPL-MCNC: 8.6 MG/DL (ref 8.6–10.4)
CHLORIDE SERPL-SCNC: 98 MMOL/L (ref 98–107)
CO2 SERPL-SCNC: 20 MMOL/L (ref 20–31)
CREAT SERPL-MCNC: 1.1 MG/DL (ref 0.5–0.9)
D DIMER PPP FEU-MCNC: 3.8 UG/ML FEU (ref 0–0.57)
ECHO LA DIAMETER: 4.6 CM
ECHO LV INTERNAL DIMENSION DIASTOLIC: 4.9 CM (ref 3.9–5.3)
ECHO LV IVSD: 0.9 CM (ref 0.6–0.9)
ECHO LV MASS 2D: 188.1 G (ref 67–162)
ECHO LV POSTERIOR WALL DIASTOLIC: 1.2 CM (ref 0.6–0.9)
ECHO LV RELATIVE WALL THICKNESS RATIO: 0.49
ECHO RV INTERNAL DIMENSION: 3 CM
EKG ATRIAL RATE: 70 BPM
EKG P AXIS: 42 DEGREES
EKG P-R INTERVAL: 160 MS
EKG Q-T INTERVAL: 444 MS
EKG QRS DURATION: 88 MS
EKG QTC CALCULATION (BAZETT): 479 MS
EKG R AXIS: 55 DEGREES
EKG T AXIS: 21 DEGREES
EKG VENTRICULAR RATE: 70 BPM
EOSINOPHIL # BLD: 0.41 K/UL (ref 0–0.44)
EOSINOPHILS RELATIVE PERCENT: 5 % (ref 1–4)
ERYTHROCYTE [DISTWIDTH] IN BLOOD BY AUTOMATED COUNT: 17.1 % (ref 11.8–14.4)
FLUAV RNA NPH QL NAA+NON-PROBE: NOT DETECTED
FLUBV RNA NPH QL NAA+NON-PROBE: NOT DETECTED
GFR, ESTIMATED: ABNORMAL ML/MIN/1.73M2
GLUCOSE BLD-MCNC: 101 MG/DL (ref 65–105)
GLUCOSE BLD-MCNC: 120 MG/DL (ref 65–105)
GLUCOSE BLD-MCNC: 214 MG/DL (ref 65–105)
GLUCOSE BLD-MCNC: 77 MG/DL (ref 65–105)
GLUCOSE SERPL-MCNC: 110 MG/DL (ref 74–99)
HADV DNA NPH QL NAA+NON-PROBE: NOT DETECTED
HCOV 229E RNA NPH QL NAA+NON-PROBE: NOT DETECTED
HCOV HKU1 RNA NPH QL NAA+NON-PROBE: NOT DETECTED
HCOV NL63 RNA NPH QL NAA+NON-PROBE: NOT DETECTED
HCOV OC43 RNA NPH QL NAA+NON-PROBE: NOT DETECTED
HCT VFR BLD AUTO: 43.1 % (ref 36.3–47.1)
HGB BLD-MCNC: 13.5 G/DL (ref 11.9–15.1)
HMPV RNA NPH QL NAA+NON-PROBE: NOT DETECTED
HPIV1 RNA NPH QL NAA+NON-PROBE: NOT DETECTED
HPIV2 RNA NPH QL NAA+NON-PROBE: NOT DETECTED
HPIV3 RNA NPH QL NAA+NON-PROBE: NOT DETECTED
HPIV4 RNA NPH QL NAA+NON-PROBE: NOT DETECTED
IMM GRANULOCYTES # BLD AUTO: 0.05 K/UL (ref 0–0.3)
IMM GRANULOCYTES NFR BLD: 1 %
INR PPP: 1.1
LYMPHOCYTES NFR BLD: 0.92 K/UL (ref 1.1–3.7)
LYMPHOCYTES RELATIVE PERCENT: 10 % (ref 24–43)
M PNEUMO DNA NPH QL NAA+NON-PROBE: NOT DETECTED
MCH RBC QN AUTO: 26.9 PG (ref 25.2–33.5)
MCHC RBC AUTO-ENTMCNC: 31.3 G/DL (ref 28.4–34.8)
MCV RBC AUTO: 85.9 FL (ref 82.6–102.9)
MONOCYTES NFR BLD: 0.82 K/UL (ref 0.1–1.2)
MONOCYTES NFR BLD: 9 % (ref 3–12)
NEUTROPHILS NFR BLD: 74 % (ref 36–65)
NEUTS SEG NFR BLD: 6.92 K/UL (ref 1.5–8.1)
NRBC BLD-RTO: 0 PER 100 WBC
PLATELET # BLD AUTO: 248 K/UL (ref 138–453)
PMV BLD AUTO: 10.6 FL (ref 8.1–13.5)
POTASSIUM SERPL-SCNC: 4.3 MMOL/L (ref 3.7–5.3)
PROT SERPL-MCNC: 6.6 G/DL (ref 6.6–8.7)
PROTHROMBIN TIME: 13.8 SEC (ref 11.7–14.9)
RBC # BLD AUTO: 5.02 M/UL (ref 3.95–5.11)
RBC # BLD: ABNORMAL 10*6/UL
RSV RNA NPH QL NAA+NON-PROBE: NOT DETECTED
RV+EV RNA NPH QL NAA+NON-PROBE: DETECTED
SARS-COV-2 RNA NPH QL NAA+NON-PROBE: NOT DETECTED
SODIUM SERPL-SCNC: 132 MMOL/L (ref 136–145)
SPECIMEN DESCRIPTION: ABNORMAL
TROPONIN I SERPL HS-MCNC: 23 NG/L (ref 0–14)
TROPONIN I SERPL HS-MCNC: 28 NG/L (ref 0–14)
WBC OTHER # BLD: 9.2 K/UL (ref 3.5–11.3)

## 2024-07-05 PROCEDURE — 93308 TTE F-UP OR LMTD: CPT | Performed by: INTERNAL MEDICINE

## 2024-07-05 PROCEDURE — 80053 COMPREHEN METABOLIC PANEL: CPT

## 2024-07-05 PROCEDURE — 3600000004 HC SURGERY LEVEL 4 BASE: Performed by: STUDENT IN AN ORGANIZED HEALTH CARE EDUCATION/TRAINING PROGRAM

## 2024-07-05 PROCEDURE — 2580000003 HC RX 258: Performed by: CHIROPRACTOR

## 2024-07-05 PROCEDURE — 73501 X-RAY EXAM HIP UNI 1 VIEW: CPT

## 2024-07-05 PROCEDURE — 6360000002 HC RX W HCPCS: Performed by: CHIROPRACTOR

## 2024-07-05 PROCEDURE — 84484 ASSAY OF TROPONIN QUANT: CPT

## 2024-07-05 PROCEDURE — 6370000000 HC RX 637 (ALT 250 FOR IP): Performed by: CHIROPRACTOR

## 2024-07-05 PROCEDURE — 0202U NFCT DS 22 TRGT SARS-COV-2: CPT

## 2024-07-05 PROCEDURE — 5A0935A ASSISTANCE WITH RESPIRATORY VENTILATION, LESS THAN 24 CONSECUTIVE HOURS, HIGH NASAL FLOW/VELOCITY: ICD-10-PCS | Performed by: STUDENT IN AN ORGANIZED HEALTH CARE EDUCATION/TRAINING PROGRAM

## 2024-07-05 PROCEDURE — 6360000002 HC RX W HCPCS: Performed by: STUDENT IN AN ORGANIZED HEALTH CARE EDUCATION/TRAINING PROGRAM

## 2024-07-05 PROCEDURE — 6370000000 HC RX 637 (ALT 250 FOR IP): Performed by: CLINICAL NURSE SPECIALIST

## 2024-07-05 PROCEDURE — 3700000000 HC ANESTHESIA ATTENDED CARE: Performed by: STUDENT IN AN ORGANIZED HEALTH CARE EDUCATION/TRAINING PROGRAM

## 2024-07-05 PROCEDURE — 85025 COMPLETE CBC W/AUTO DIFF WBC: CPT

## 2024-07-05 PROCEDURE — 82947 ASSAY GLUCOSE BLOOD QUANT: CPT

## 2024-07-05 PROCEDURE — 0SRR0JZ REPLACEMENT OF RIGHT HIP JOINT, FEMORAL SURFACE WITH SYNTHETIC SUBSTITUTE, OPEN APPROACH: ICD-10-PCS | Performed by: STUDENT IN AN ORGANIZED HEALTH CARE EDUCATION/TRAINING PROGRAM

## 2024-07-05 PROCEDURE — 85379 FIBRIN DEGRADATION QUANT: CPT

## 2024-07-05 PROCEDURE — 3600000014 HC SURGERY LEVEL 4 ADDTL 15MIN: Performed by: STUDENT IN AN ORGANIZED HEALTH CARE EDUCATION/TRAINING PROGRAM

## 2024-07-05 PROCEDURE — 2580000003 HC RX 258: Performed by: STUDENT IN AN ORGANIZED HEALTH CARE EDUCATION/TRAINING PROGRAM

## 2024-07-05 PROCEDURE — 82306 VITAMIN D 25 HYDROXY: CPT

## 2024-07-05 PROCEDURE — 6360000002 HC RX W HCPCS

## 2024-07-05 PROCEDURE — 27236 TREAT THIGH FRACTURE: CPT | Performed by: STUDENT IN AN ORGANIZED HEALTH CARE EDUCATION/TRAINING PROGRAM

## 2024-07-05 PROCEDURE — 85610 PROTHROMBIN TIME: CPT

## 2024-07-05 PROCEDURE — 93308 TTE F-UP OR LMTD: CPT

## 2024-07-05 PROCEDURE — 2500000003 HC RX 250 WO HCPCS

## 2024-07-05 PROCEDURE — 6360000004 HC RX CONTRAST MEDICATION: Performed by: STUDENT IN AN ORGANIZED HEALTH CARE EDUCATION/TRAINING PROGRAM

## 2024-07-05 PROCEDURE — 73552 X-RAY EXAM OF FEMUR 2/>: CPT

## 2024-07-05 PROCEDURE — 99223 1ST HOSP IP/OBS HIGH 75: CPT | Performed by: STUDENT IN AN ORGANIZED HEALTH CARE EDUCATION/TRAINING PROGRAM

## 2024-07-05 PROCEDURE — 7100000001 HC PACU RECOVERY - ADDTL 15 MIN: Performed by: STUDENT IN AN ORGANIZED HEALTH CARE EDUCATION/TRAINING PROGRAM

## 2024-07-05 PROCEDURE — 7100000000 HC PACU RECOVERY - FIRST 15 MIN: Performed by: STUDENT IN AN ORGANIZED HEALTH CARE EDUCATION/TRAINING PROGRAM

## 2024-07-05 PROCEDURE — 99223 1ST HOSP IP/OBS HIGH 75: CPT | Performed by: INTERNAL MEDICINE

## 2024-07-05 PROCEDURE — 2700000000 HC OXYGEN THERAPY PER DAY

## 2024-07-05 PROCEDURE — 2720000010 HC SURG SUPPLY STERILE: Performed by: STUDENT IN AN ORGANIZED HEALTH CARE EDUCATION/TRAINING PROGRAM

## 2024-07-05 PROCEDURE — 99232 SBSQ HOSP IP/OBS MODERATE 35: CPT | Performed by: STUDENT IN AN ORGANIZED HEALTH CARE EDUCATION/TRAINING PROGRAM

## 2024-07-05 PROCEDURE — 2060000000 HC ICU INTERMEDIATE R&B

## 2024-07-05 PROCEDURE — 71260 CT THORAX DX C+: CPT

## 2024-07-05 PROCEDURE — 3700000001 HC ADD 15 MINUTES (ANESTHESIA): Performed by: STUDENT IN AN ORGANIZED HEALTH CARE EDUCATION/TRAINING PROGRAM

## 2024-07-05 PROCEDURE — 2580000003 HC RX 258

## 2024-07-05 PROCEDURE — 2580000003 HC RX 258: Performed by: CLINICAL NURSE SPECIALIST

## 2024-07-05 PROCEDURE — C1776 JOINT DEVICE (IMPLANTABLE): HCPCS | Performed by: STUDENT IN AN ORGANIZED HEALTH CARE EDUCATION/TRAINING PROGRAM

## 2024-07-05 PROCEDURE — 36415 COLL VENOUS BLD VENIPUNCTURE: CPT

## 2024-07-05 PROCEDURE — 2709999900 HC NON-CHARGEABLE SUPPLY: Performed by: STUDENT IN AN ORGANIZED HEALTH CARE EDUCATION/TRAINING PROGRAM

## 2024-07-05 PROCEDURE — 73502 X-RAY EXAM HIP UNI 2-3 VIEWS: CPT

## 2024-07-05 DEVICE — ACTIS DUOFIX HIP PROSTHESIS (FEMORAL STEM 12/14 TAPER CEMENTLESS SIZE 1 HIGH COLLAR)  CE
Type: IMPLANTABLE DEVICE | Site: HIP | Status: FUNCTIONAL
Brand: ACTIS

## 2024-07-05 DEVICE — CABLE SURG DIA1.7MM S STL HA CERCLAGE W/ CRMP 29880101S] DEPUY SYNTHES USA]: Type: IMPLANTABLE DEVICE | Site: FEMUR | Status: FUNCTIONAL

## 2024-07-05 DEVICE — IMPL CAPPED H6 HEMI UNI/BIPOLAR DEPUYSYNTHES: Type: IMPLANTABLE DEVICE | Site: HIP | Status: FUNCTIONAL

## 2024-07-05 DEVICE — ARTICUL/EZE FEMORAL HEAD DIAMETER 28MM +8.5 12/14 TAPER
Type: IMPLANTABLE DEVICE | Site: HIP | Status: FUNCTIONAL
Brand: ARTICUL/EZE

## 2024-07-05 DEVICE — SELF CENTERING BI-POLAR HEAD 28MM ID 47MM OD
Type: IMPLANTABLE DEVICE | Site: HIP | Status: FUNCTIONAL
Brand: SELF CENTERING

## 2024-07-05 RX ORDER — SODIUM CHLORIDE 0.9 % (FLUSH) 0.9 %
5-40 SYRINGE (ML) INJECTION PRN
Status: DISCONTINUED | OUTPATIENT
Start: 2024-07-05 | End: 2024-07-05 | Stop reason: HOSPADM

## 2024-07-05 RX ORDER — FENTANYL CITRATE 50 UG/ML
INJECTION, SOLUTION INTRAMUSCULAR; INTRAVENOUS PRN
Status: DISCONTINUED | OUTPATIENT
Start: 2024-07-05 | End: 2024-07-05 | Stop reason: SDUPTHER

## 2024-07-05 RX ORDER — LIDOCAINE HYDROCHLORIDE 10 MG/ML
INJECTION, SOLUTION EPIDURAL; INFILTRATION; INTRACAUDAL; PERINEURAL PRN
Status: DISCONTINUED | OUTPATIENT
Start: 2024-07-05 | End: 2024-07-05 | Stop reason: SDUPTHER

## 2024-07-05 RX ORDER — ONDANSETRON 2 MG/ML
4 INJECTION INTRAMUSCULAR; INTRAVENOUS
Status: DISCONTINUED | OUTPATIENT
Start: 2024-07-05 | End: 2024-07-05 | Stop reason: HOSPADM

## 2024-07-05 RX ORDER — VANCOMYCIN HYDROCHLORIDE 1 G/20ML
INJECTION, POWDER, LYOPHILIZED, FOR SOLUTION INTRAVENOUS PRN
Status: DISCONTINUED | OUTPATIENT
Start: 2024-07-05 | End: 2024-07-05 | Stop reason: HOSPADM

## 2024-07-05 RX ORDER — FUROSEMIDE 10 MG/ML
20 INJECTION INTRAMUSCULAR; INTRAVENOUS ONCE
Status: COMPLETED | OUTPATIENT
Start: 2024-07-05 | End: 2024-07-05

## 2024-07-05 RX ORDER — LABETALOL HYDROCHLORIDE 5 MG/ML
10 INJECTION, SOLUTION INTRAVENOUS
Status: DISCONTINUED | OUTPATIENT
Start: 2024-07-05 | End: 2024-07-05 | Stop reason: HOSPADM

## 2024-07-05 RX ORDER — TRANEXAMIC ACID 10 MG/ML
INJECTION, SOLUTION INTRAVENOUS PRN
Status: DISCONTINUED | OUTPATIENT
Start: 2024-07-05 | End: 2024-07-05 | Stop reason: SDUPTHER

## 2024-07-05 RX ORDER — ONDANSETRON 2 MG/ML
INJECTION INTRAMUSCULAR; INTRAVENOUS PRN
Status: DISCONTINUED | OUTPATIENT
Start: 2024-07-05 | End: 2024-07-05 | Stop reason: SDUPTHER

## 2024-07-05 RX ORDER — NALOXONE HYDROCHLORIDE 0.4 MG/ML
INJECTION, SOLUTION INTRAMUSCULAR; INTRAVENOUS; SUBCUTANEOUS PRN
Status: DISCONTINUED | OUTPATIENT
Start: 2024-07-05 | End: 2024-07-05 | Stop reason: HOSPADM

## 2024-07-05 RX ORDER — MAGNESIUM HYDROXIDE 1200 MG/15ML
LIQUID ORAL CONTINUOUS PRN
Status: DISCONTINUED | OUTPATIENT
Start: 2024-07-05 | End: 2024-07-05 | Stop reason: HOSPADM

## 2024-07-05 RX ORDER — ROCURONIUM BROMIDE 10 MG/ML
INJECTION, SOLUTION INTRAVENOUS PRN
Status: DISCONTINUED | OUTPATIENT
Start: 2024-07-05 | End: 2024-07-05 | Stop reason: SDUPTHER

## 2024-07-05 RX ORDER — PROPOFOL 10 MG/ML
INJECTION, EMULSION INTRAVENOUS PRN
Status: DISCONTINUED | OUTPATIENT
Start: 2024-07-05 | End: 2024-07-05 | Stop reason: SDUPTHER

## 2024-07-05 RX ORDER — SODIUM CHLORIDE 9 MG/ML
INJECTION, SOLUTION INTRAVENOUS PRN
Status: DISCONTINUED | OUTPATIENT
Start: 2024-07-05 | End: 2024-07-05 | Stop reason: HOSPADM

## 2024-07-05 RX ORDER — DEXTROSE MONOHYDRATE 100 MG/ML
INJECTION, SOLUTION INTRAVENOUS CONTINUOUS PRN
Status: DISCONTINUED | OUTPATIENT
Start: 2024-07-05 | End: 2024-07-17 | Stop reason: HOSPADM

## 2024-07-05 RX ORDER — SODIUM CHLORIDE 0.9 % (FLUSH) 0.9 %
5-40 SYRINGE (ML) INJECTION EVERY 12 HOURS SCHEDULED
Status: DISCONTINUED | OUTPATIENT
Start: 2024-07-05 | End: 2024-07-05 | Stop reason: HOSPADM

## 2024-07-05 RX ORDER — TOBRAMYCIN 1.2 G/30ML
INJECTION, POWDER, LYOPHILIZED, FOR SOLUTION INTRAVENOUS PRN
Status: DISCONTINUED | OUTPATIENT
Start: 2024-07-05 | End: 2024-07-05 | Stop reason: HOSPADM

## 2024-07-05 RX ORDER — CEFAZOLIN SODIUM 1 G/3ML
INJECTION, POWDER, FOR SOLUTION INTRAMUSCULAR; INTRAVENOUS PRN
Status: DISCONTINUED | OUTPATIENT
Start: 2024-07-05 | End: 2024-07-05 | Stop reason: SDUPTHER

## 2024-07-05 RX ORDER — HYDRALAZINE HYDROCHLORIDE 20 MG/ML
10 INJECTION INTRAMUSCULAR; INTRAVENOUS
Status: DISCONTINUED | OUTPATIENT
Start: 2024-07-05 | End: 2024-07-05 | Stop reason: HOSPADM

## 2024-07-05 RX ORDER — SODIUM CHLORIDE, SODIUM LACTATE, POTASSIUM CHLORIDE, CALCIUM CHLORIDE 600; 310; 30; 20 MG/100ML; MG/100ML; MG/100ML; MG/100ML
INJECTION, SOLUTION INTRAVENOUS CONTINUOUS PRN
Status: DISCONTINUED | OUTPATIENT
Start: 2024-07-05 | End: 2024-07-05 | Stop reason: SDUPTHER

## 2024-07-05 RX ORDER — PHENYLEPHRINE HCL IN 0.9% NACL 1 MG/10 ML
SYRINGE (ML) INTRAVENOUS PRN
Status: DISCONTINUED | OUTPATIENT
Start: 2024-07-05 | End: 2024-07-05 | Stop reason: SDUPTHER

## 2024-07-05 RX ORDER — DEXAMETHASONE SODIUM PHOSPHATE 10 MG/ML
INJECTION, SOLUTION INTRAMUSCULAR; INTRAVENOUS PRN
Status: DISCONTINUED | OUTPATIENT
Start: 2024-07-05 | End: 2024-07-05 | Stop reason: SDUPTHER

## 2024-07-05 RX ORDER — GLUCAGON 1 MG/ML
1 KIT INJECTION PRN
Status: DISCONTINUED | OUTPATIENT
Start: 2024-07-05 | End: 2024-07-17 | Stop reason: HOSPADM

## 2024-07-05 RX ORDER — MORPHINE SULFATE 2 MG/ML
2 INJECTION, SOLUTION INTRAMUSCULAR; INTRAVENOUS EVERY 4 HOURS PRN
Status: DISCONTINUED | OUTPATIENT
Start: 2024-07-05 | End: 2024-07-08

## 2024-07-05 RX ORDER — SODIUM CHLORIDE, SODIUM LACTATE, POTASSIUM CHLORIDE, CALCIUM CHLORIDE 600; 310; 30; 20 MG/100ML; MG/100ML; MG/100ML; MG/100ML
INJECTION, SOLUTION INTRAVENOUS CONTINUOUS
Status: DISCONTINUED | OUTPATIENT
Start: 2024-07-05 | End: 2024-07-06

## 2024-07-05 RX ADMIN — BUSPIRONE HYDROCHLORIDE 5 MG: 5 TABLET ORAL at 09:27

## 2024-07-05 RX ADMIN — Medication 200 MCG: at 14:45

## 2024-07-05 RX ADMIN — SODIUM CHLORIDE, PRESERVATIVE FREE 10 ML: 5 INJECTION INTRAVENOUS at 09:29

## 2024-07-05 RX ADMIN — Medication 100 MCG: at 15:30

## 2024-07-05 RX ADMIN — CEFAZOLIN 2 G: 1 INJECTION, POWDER, FOR SOLUTION INTRAMUSCULAR; INTRAVENOUS at 14:37

## 2024-07-05 RX ADMIN — Medication 1000 MCG: at 09:27

## 2024-07-05 RX ADMIN — MORPHINE SULFATE 2 MG: 2 INJECTION, SOLUTION INTRAMUSCULAR; INTRAVENOUS at 22:44

## 2024-07-05 RX ADMIN — Medication 100 MCG: at 15:13

## 2024-07-05 RX ADMIN — RIVASTIGMINE TARTRATE 3 MG: 3 CAPSULE ORAL at 09:28

## 2024-07-05 RX ADMIN — Medication 100 MCG: at 15:46

## 2024-07-05 RX ADMIN — CARBIDOPA AND LEVODOPA 1 TABLET: 25; 100 TABLET, EXTENDED RELEASE ORAL at 21:13

## 2024-07-05 RX ADMIN — INSULIN LISPRO 2 UNITS: 100 INJECTION, SOLUTION INTRAVENOUS; SUBCUTANEOUS at 22:44

## 2024-07-05 RX ADMIN — Medication 200 MCG: at 14:07

## 2024-07-05 RX ADMIN — FENTANYL CITRATE 25 MCG: 50 INJECTION, SOLUTION INTRAMUSCULAR; INTRAVENOUS at 15:09

## 2024-07-05 RX ADMIN — SUGAMMADEX 200 MG: 100 INJECTION, SOLUTION INTRAVENOUS at 16:05

## 2024-07-05 RX ADMIN — Medication 200 MCG: at 14:02

## 2024-07-05 RX ADMIN — SODIUM CHLORIDE, POTASSIUM CHLORIDE, SODIUM LACTATE AND CALCIUM CHLORIDE: 600; 310; 30; 20 INJECTION, SOLUTION INTRAVENOUS at 13:51

## 2024-07-05 RX ADMIN — Medication 100 MCG: at 15:34

## 2024-07-05 RX ADMIN — RIVASTIGMINE TARTRATE 3 MG: 3 CAPSULE ORAL at 21:13

## 2024-07-05 RX ADMIN — PROPOFOL 100 MG: 10 INJECTION, EMULSION INTRAVENOUS at 13:55

## 2024-07-05 RX ADMIN — ONDANSETRON 4 MG: 2 INJECTION INTRAMUSCULAR; INTRAVENOUS at 15:39

## 2024-07-05 RX ADMIN — IOPAMIDOL 75 ML: 755 INJECTION, SOLUTION INTRAVENOUS at 04:46

## 2024-07-05 RX ADMIN — Medication 100 MCG: at 15:42

## 2024-07-05 RX ADMIN — CARBIDOPA AND LEVODOPA 1 TABLET: 25; 100 TABLET, EXTENDED RELEASE ORAL at 09:28

## 2024-07-05 RX ADMIN — SODIUM CHLORIDE, POTASSIUM CHLORIDE, SODIUM LACTATE AND CALCIUM CHLORIDE: 600; 310; 30; 20 INJECTION, SOLUTION INTRAVENOUS at 22:48

## 2024-07-05 RX ADMIN — SODIUM CHLORIDE, POTASSIUM CHLORIDE, SODIUM LACTATE AND CALCIUM CHLORIDE: 600; 310; 30; 20 INJECTION, SOLUTION INTRAVENOUS at 15:22

## 2024-07-05 RX ADMIN — FENTANYL CITRATE 50 MCG: 50 INJECTION, SOLUTION INTRAMUSCULAR; INTRAVENOUS at 13:55

## 2024-07-05 RX ADMIN — FENTANYL CITRATE 50 MCG: 50 INJECTION, SOLUTION INTRAMUSCULAR; INTRAVENOUS at 14:52

## 2024-07-05 RX ADMIN — Medication 100 MCG: at 14:22

## 2024-07-05 RX ADMIN — TRANEXAMIC ACID 1 G: 10 INJECTION, SOLUTION INTRAVENOUS at 14:46

## 2024-07-05 RX ADMIN — FUROSEMIDE 20 MG: 10 INJECTION, SOLUTION INTRAMUSCULAR; INTRAVENOUS at 01:58

## 2024-07-05 RX ADMIN — DEXAMETHASONE SODIUM PHOSPHATE 5 MG: 10 INJECTION, SOLUTION INTRAMUSCULAR; INTRAVENOUS at 14:41

## 2024-07-05 RX ADMIN — SODIUM CHLORIDE, POTASSIUM CHLORIDE, SODIUM LACTATE AND CALCIUM CHLORIDE: 600; 310; 30; 20 INJECTION, SOLUTION INTRAVENOUS at 11:18

## 2024-07-05 RX ADMIN — TROSPIUM CHLORIDE 20 MG: 20 TABLET, FILM COATED ORAL at 21:13

## 2024-07-05 RX ADMIN — Medication 150 MCG: at 15:22

## 2024-07-05 RX ADMIN — Medication 200 MCG: at 15:03

## 2024-07-05 RX ADMIN — MORPHINE SULFATE 2 MG: 2 INJECTION, SOLUTION INTRAMUSCULAR; INTRAVENOUS at 10:38

## 2024-07-05 RX ADMIN — LIDOCAINE HYDROCHLORIDE 50 MG: 10 INJECTION, SOLUTION EPIDURAL; INFILTRATION; INTRACAUDAL; PERINEURAL at 13:55

## 2024-07-05 RX ADMIN — ROCURONIUM BROMIDE 10 MG: 10 INJECTION, SOLUTION INTRAVENOUS at 15:10

## 2024-07-05 RX ADMIN — SERTRALINE HYDROCHLORIDE 50 MG: 50 TABLET ORAL at 09:28

## 2024-07-05 RX ADMIN — FENTANYL CITRATE 25 MCG: 50 INJECTION, SOLUTION INTRAMUSCULAR; INTRAVENOUS at 16:11

## 2024-07-05 RX ADMIN — Medication 100 MCG: at 15:59

## 2024-07-05 RX ADMIN — DESMOPRESSIN ACETATE 40 MG: 0.2 TABLET ORAL at 09:27

## 2024-07-05 RX ADMIN — METOPROLOL TARTRATE 25 MG: 25 TABLET ORAL at 21:13

## 2024-07-05 RX ADMIN — ROCURONIUM BROMIDE 50 MG: 10 INJECTION, SOLUTION INTRAVENOUS at 13:55

## 2024-07-05 RX ADMIN — DOCUSATE SODIUM 100 MG: 100 CAPSULE, LIQUID FILLED ORAL at 09:28

## 2024-07-05 RX ADMIN — Medication 100 MCG: at 15:51

## 2024-07-05 RX ADMIN — SODIUM CHLORIDE, PRESERVATIVE FREE 10 ML: 5 INJECTION INTRAVENOUS at 21:13

## 2024-07-05 RX ADMIN — Medication 2000 MG: at 22:44

## 2024-07-05 RX ADMIN — BUSPIRONE HYDROCHLORIDE 5 MG: 5 TABLET ORAL at 21:13

## 2024-07-05 RX ADMIN — METOPROLOL TARTRATE 25 MG: 25 TABLET ORAL at 09:28

## 2024-07-05 RX ADMIN — EZETIMIBE 10 MG: 10 TABLET ORAL at 21:13

## 2024-07-05 ASSESSMENT — PAIN DESCRIPTION - LOCATION
LOCATION: HIP

## 2024-07-05 ASSESSMENT — PAIN DESCRIPTION - DESCRIPTORS
DESCRIPTORS: THROBBING
DESCRIPTORS: THROBBING;STABBING;JABBING
DESCRIPTORS: THROBBING

## 2024-07-05 ASSESSMENT — PAIN - FUNCTIONAL ASSESSMENT
PAIN_FUNCTIONAL_ASSESSMENT: INTOLERABLE, UNABLE TO DO ANY ACTIVE OR PASSIVE ACTIVITIES
PAIN_FUNCTIONAL_ASSESSMENT: INTOLERABLE, UNABLE TO DO ANY ACTIVE OR PASSIVE ACTIVITIES

## 2024-07-05 ASSESSMENT — PAIN SCALES - GENERAL
PAINLEVEL_OUTOF10: 10
PAINLEVEL_OUTOF10: 10
PAINLEVEL_OUTOF10: 0
PAINLEVEL_OUTOF10: 10

## 2024-07-05 ASSESSMENT — PAIN DESCRIPTION - ORIENTATION
ORIENTATION: RIGHT
ORIENTATION: RIGHT

## 2024-07-05 NOTE — OP NOTE
Operative Note    Eliz Macias  YOB: 1935  [id number]    DOS: 7/5/24    Pre-operative Diagnosis: Right Femoral Neck Fracture    Post-operative Diagnosis: Right Femoral Neck Fracture    Procedure:  Right Hip Hemiarthroplasty     Anesthesia: General    Surgeons: Ramez Marvin DO    Assistants: Alexi Edward DO; Skip Mejia DO     Estimated Blood Loss: 250cc    IVF: 1200cc crystalloid    Complications: None    Specimens: Was Not Obtained    Findings: Displaced Right Femoral Neck Fracture    Implants: Depuy Corail Size 47 Head + 8.5, Size 1 Stem, HI offset     Indications:  This is a 89 yo Female who presents for operative intervention of their Right femoral neck fracture. All treatment options, including conservative and operative, were discussed with the patient in detail including the risks and benefits of each. Risks including but not limited to  bleeding, blood clots, infection, damage to nearby tissues, vessels and nerves, wound healing complications, failed procedure, stiffness, loss of motion, intraoperative fracture, anesthesia risk, leg length inequality, loss of life were all discussed with the patient.  Knowing these risks, the patient wished to proceed with surgery as indicated. Consent was obtained. Site Marked. All questions answered to the patient's satisfaction.        Operative Summary:  The patient was wheeled to the operative suite and laid on the table in supine fashion. Endotracheal intubation was performed under general anesthesia. The patient was place in the lateral decubitus position, axillary roll was placed. All bony prominences were appropriately protected. The operative leg was prepped and draped in sterile fashion. Preoperative antibiotics and single dose 1 gram TXA was administered.  At this time all team members paused to identify proper patient name, indications, site, allergies.  All team members were in agreement.    Using a #10 blade, an incision through skin

## 2024-07-05 NOTE — PROGRESS NOTES
Pt admitted to stepdown unit. Unable to communicate properly with pt d/t language barrier. Pt only speaks Syriac and doesn't seem to understand how to communicate with . Pts son states he believes dementia to be the issue.

## 2024-07-05 NOTE — PROGRESS NOTES
Occupational Therapy    [facility]  Occupational Therapy Not Seen Note    DATE: 2024    NAME: Eliz Macias  MRN: [id number]   : 1935      Patient not seen this date for Occupational Therapy due to:    Strict Bedrest: Plan for OR 2024 with Dr. Marvin for right hip hemiarthroplasty pending risk stratification per chart. Check back post OP for OT evaluation.     Electronically signed by KIARRA Wood/L on 2024 at 7:26 AM

## 2024-07-05 NOTE — PROGRESS NOTES
Orthopaedic Surgery Consult  (Dr. Marvin)    Time if Evaluation: 1210    CC/Reason for consult: Right femoral neck fracture    HPI:      The patient is a 88 y.o. female who fell from standing height over 1 day ago in her kitchen.  Patient initially presented to the emergency department in [place] where she was seen in the emergency department for right hip pain.  Plain radiographs were taken which demonstrated a right femoral neck fracture.  Patient was subsequently transferred to [place] emergency department for further evaluation and orthopedic treatment.  Patient was seen resting comfortably in her bed however she has mild dementia and only speaks certain dialect of French.   was attempted to be used however patient could not understand the dialect so majority of her questions were translated through her son over the phone who is her POA and through chart review.  Patient did not hit her head and does not admit to pain to any of her other extremities at this time.  She has had no recent fever, chills, chest pain, or shortness of breath.  She denies any new numbness, tingling or weakness.  Patient normally ambulates with a walker.  Other than her right hip pain she has no other orthopedic complaints or concerns at this time.    Patient is on Plavix, aspirin, and multiple other medications for her diabetes, and coronary artery disease.  Patient also has hypertension, hyperlipidemia and Parkinson features.  There is no significant surgical history in patient's chart or from history given by her son.    Past Medical History:    Past Medical History:   Diagnosis Date    Fall 09/23/2023     Past Surgical History:    No past surgical history on file.  Medications Prior to Admission:   Prior to Admission medications    Medication Sig Start Date End Date Taking? Authorizing Provider   clopidogrel (PLAVIX) 75 MG tablet Take 1 tablet by mouth daily 2/5/23   Tigre Agustin MD   vitamin B-12  8.8   HGB 13.4   HCT 40.2      *   K 4.5   BUN 19   CREATININE 1.1*   GLUCOSE 278*        Imaging:   Multiple plain radiographic views of the pelvis and right hip AP, and lateral demonstrating a subcapital femoral neck fracture.  Fracture is shortened.  There is no subluxation or dislocation of the femoral acetabular joint.  There are no distal extensions of the fracture into the intertrochanteric region or distal femur.  There are no fractures appreciated about the pelvis.  No other osseous abnormalities are noted    Assessment/Plan: 88 y.o. female who fell from standing height, being seen for:    -Right subcapital femoral neck fracture    -Plan for OR 7/5/2024 with Dr. Marvin for right hip hemiarthroplasty pending risk stratification  -WB status: Nonweightbearing to the right lower extremity  -primary team: Internal medicine  -consults  -Diet: N.p.o. in anticipation for or  -abx/tetanus: Ancef on-call to the OR  -DVT ppx: Lovenox per primary recommendations.  -consent/marked  -Pre-op labs  -F/u VitD level  -Pain control per primary: Internal medicine  -Ice and elevate extremity for pain and swelling  -PT/OT: To see and evaluate postoperatively  -Follow up with Dr. Marvin regarding patient plan and disposition  -Please contact ortho with any questions      Hugo Suh MD  Resident Physician, PGY-1   Orthopaedic Surgery  12:02 AM 7/5/2024    This note is created with the assistance of a speech recognition program. While intending to generate a document that actually reflects the content of the visit, the document can still have some errors including those of syntax and sound a like substitutions which may escape proof reading.  In such instances, actual meaning can be extrapolated by contextual diversion.

## 2024-07-05 NOTE — PROGRESS NOTES
Patient left floor for OR. Transported by bed. Ancef 2000 mg IV sent with patient. Patient's son (POA) at bedside.

## 2024-07-05 NOTE — PROGRESS NOTES
Pt was transported to OR with purple yarn wrapped around her left wrist/hand. Staff in the room wasn't sure if it was of Tenriism or sentimental importance to the patient. It had to be cut off in order to access wrist for arterial line placement. Yarn was placed in a bag, labeled, and placed in patient's chart. Dentures were also removed by CRNA and placed in a labeled denture cup.

## 2024-07-05 NOTE — PROGRESS NOTES
Patient SPO2 85-88% on 8 /L simple mask. Writer notified Respiratory. Respiratory bedside. Patient to be placed on [place].

## 2024-07-05 NOTE — H&P
[facility]  Office: [phone]  Eugenio Davis DO, Stahish Michelle DO, Chito Duke DO, Tommie Camacho DO, Sanjay Koroma MD, Summer Salazar MD, Luis Enrique Beckman MD, Dhara Morales MD,  Jourdan Moore MD, Eris Boyle MD, Matt Beatty DO, Karlo Gibbs MD,  Rhys Das DO, Álvaro Couch MD, Carlos Bender MD, Luis Davis DO, Julia Fitzpatrick MD, Fransisco Ramirez MD, Lincoln Chamberlain DO, Argenis Lopez MD, Eleanor Johnson MD, Lara Murcia MD, Alise Newell MD,  Harrison Rodriguez DO, Cristina Figueroa MD,  Shirley Waterhouse, CNP,  Mary Beth Tomas, CNP, Dominique Barton, CNP, Alok Zimmerman, CNP,  Shanelle Oquendo, ANDREIA, Gina Torrez, CNP, Steffany Hillman, CNP, Yomaira Curran, CNP, Natty Guerra, CNP, Mayra Sandoval, CNP, SOURAV Rodas-ABIGAIL, Nereyda Will, CNS, Briana Jane, CNP, Shyann Linton, CNP         [facility]   IN-PATIENT SERVICE   [facility]    HISTORY AND PHYSICAL EXAMINATION            Date:   7/5/2024  Patient name:  Eliz Macias  Date of admission:  7/4/2024 10:41 PM  MRN:   [id number]  Account:  [id number]  YOB: 1935  PCP:    Lee Sainz MD  Room:   [address] [place] [address]  Code Status:    Prior    Chief Complaint:     Femoral fracture transferred for orthopedic/surgical intervention    History Obtained From:     Mostly through chart review, tried using  patient was not understanding and just nodding her head without clear understanding    History of Present Illness:     Eliz Macias is a 88 y.o. Non- / non  female who presents with fall resulted in fracture transferred for surgical intervention   and is admitted to the hospital for the management of Fracture of unspecified part of neck of right femur, initial encounter for closed fracture (HCC).    88-year-old female Azeri speaking with past medical history of diabetes mellitus type 2, CAD heart attack 4 years ago on aspirin and Plavix, hypertension, hyperlipidemia presented

## 2024-07-05 NOTE — CARE COORDINATION
Post Acute Facility/Agency List     Provided child with the following list, the list includes the overall star ratings obtained from CMS per the Medicare Web site (www.Medicare.gov):     [] Long Term Acute Care Facilities  [x] Acute Inpatient Rehabilitation Facilities  [x] Skilled Nursing Facilities  [] Home Care    Provided verbal instructions on how to utilize the QR Code to obtain additional detailed star ratings from www.Medicare.gov     offered to print and provide the detailed list:    []Accepted   [x]Declined    The following printed detailed lists were provided: n/a

## 2024-07-05 NOTE — PLAN OF CARE
Problem: Discharge Planning  Goal: Discharge to home or other facility with appropriate resources  Outcome: Progressing     Problem: Safety - Adult  Goal: Free from fall injury  Outcome: Progressing  Flowsheets (Taken 7/4/2024 3810)  Free From Fall Injury:   Instruct family/caregiver on patient safety   Based on caregiver fall risk screen, instruct family/caregiver to ask for assistance with transferring infant if caregiver noted to have fall risk factors     Problem: Skin/Tissue Integrity  Goal: Absence of new skin breakdown  Description: 1.  Monitor for areas of redness and/or skin breakdown  2.  Assess vascular access sites hourly  3.  Every 4-6 hours minimum:  Change oxygen saturation probe site  4.  Every 4-6 hours:  If on nasal continuous positive airway pressure, respiratory therapy assess nares and determine need for appliance change or resting period.  Outcome: Progressing     Problem: ABCDS Injury Assessment  Goal: Absence of physical injury  Outcome: Progressing

## 2024-07-05 NOTE — CARE COORDINATION
Transitional planning      Writer received vm from [facility], can accept if surgery goes well and appropriate, will follow.

## 2024-07-05 NOTE — BRIEF OP NOTE
Brief Postoperative Note      Patient: Eliz Macias  YOB: 1935  MRN: [id number]    Date of Procedure: 7/5/2024    Pre-Op Diagnosis Codes:  Right femoral neck fracture    Post-Op Diagnosis:   Same       Procedure(s):  Right hip hemiarthroplasty     Surgeon(s):  Ramez Marvin DO    Assistant:  Resident: Alexi Edward DO; Skip Mejia DO    Anesthesia: General    Estimated Blood Loss (mL): 250    Fluids (mL): 1200    Complications: None    Specimens:   * No specimens in log *    Implants:  Implant Name Type Inv. Item Serial No.  Lot No. LRB No. Used Action   CABLE SURG DIA1.7MM S STL HA CERCLAGE W/ CRMP [id number]] DEPUY SYNTHES [facility]] - [id number]  CABLE SURG DIA1.7MM S STL HA CERCLAGE W/ CRMP [id number]] DEPUY SYNTHES [facility]]  DEPUY [employer]- [id number] Right 1 Implanted   HEAD FEM [id number] +8.5MM OFFSET HIP ULT STD ARTC EZ 12/14 - [id number]  HEAD FEM [id number] +8.5MM OFFSET HIP ULT STD ARTC EZ 12/14  [facility] [place] [facility] [id number] Right 1 Implanted   HEAD BPLR OD47MM ID28MM FEM HIP BRN POS ECC SELF CNTR - [id number]  HEAD BPLR OD47MM ID28MM FEM HIP BRN POS ECC SELF CNTR  [facility] DEPAndroid App Review Source [facility] [id number] Right 1 Implanted   STEM FEM SZ 1 HI OFFSET CLLRD HIP ARTC EZ 12/14 TAPR ACTIS - [id number]  STEM FEM SZ 1 HI OFFSET CLLRD HIP ARTC EZ 12/14 TAPR ACTIS  [facility] DEPAndroid App Review Source ORTHOPEDICS- [id number] Right 1 Implanted         Drains:   [REMOVED] External Urinary Catheter (Removed)   Site Assessment Clean,dry & intact 07/05/24 0400   Placement Replaced 07/05/24 0400   Securement Method Other (Comment) 07/05/24 0400   Catheter Care Catheter/Wick replaced;Suction Canister/Tubing changed 07/05/24 0400   Perineal Care No 07/04/24 2248   Suction 40 mmgHg continuous 07/05/24 0400   Urine Color Yellow 07/04/24 2045   Urine Appearance Clear 07/04/24 2045       Findings:  Infection Present At Time Of Surgery (PATOS) (choose all levels that have infection present):  No infection

## 2024-07-05 NOTE — CONSULTS
Attestation signed by      Attending Physician Statement:    I have discussed the care of  Eliz Macias , including pertinent history and exam findings, with the Cardiology fellow/resident.     I have seen and examined the patient and the key elements of all parts of the encounter have been performed by me. I agree with the assessment, plan and orders as documented by the fellow/resident, after I modified exam findings and plan of treatments, and the final version is my approved version of the assessment.     Additional Comments: No chest pain or SOB. Mechanical fall and femur fracture. Echo reviewed with preserved EF and no significant valvular disease. Ok to proceed with surgery with moderate cardiac risk.              [facility]   Admission Note                 Patient's name:  Eliz Macias  Medical Record Number: [id number]  Patient's account/billing number: [id number]  Patient's YOB: 1935  Age: 88 y.o.  Date of Admission: 7/4/2024 10:41 PM  Date of History and Physical Examination: 7/5/2024  Primary Care Physician: Lee Sainz MD    Code Status: Prior    CHIEF COMPLAINT: Mechanical fall leading to right femoral neck fracture    CONSULT REASON: Elevated troponin, clearance for right femoral neck fracture repair    HISTORY OF PRESENT ILLNESS:      History was obtained from chart review and the patient.      Eliz Macias is a 88 y.o. with past medical history significant for type 2 diabetes, essential hypertension and dyslipidemia who presented at an outside hospital after a mechanical fall on 7/3 around 6 PM in the living room and found to have right femoral neck fracture.  Patient is Pashto speaking and was not understanding the .  Some interpretation has been made, patient denies any chest pain and shortness of breath right now.  She is in a lot of pain from the femoral neck fracture.  At baseline she does not walk that much but uses an walker to ambulate.    We

## 2024-07-05 NOTE — ANESTHESIA PRE PROCEDURE
discrete inferior wall abnormality appreciated except at the      inferolateral aspect. This is fixed.            2.  Overall left ventricular systolic function is maintained with      some limited motion of the lateral wall.            3.  The nuclear findings indicate the risk of myocardial ischemia to      be moderate given the EKG changes and the preexisting    lateral wall      chronic ischemic changes.     4.4.  There is no significant change from the previous study of      1/10/2012.          Neuro/Psych:   (+) CVA:, psychiatric history:depression/anxiety dementia             ROS comment: mechanical fall on 7/3     Parkinson    B/L carotid stenosis GI/Hepatic/Renal:   (+) GERD:          Endo/Other:    (+) DiabetesType II DM, hypothyroidism, blood dyscrasia: anticoagulation therapy:..                  ROS comment: fracture of right femoral neck    Plavix    Rivastigmine Abdominal:             Vascular:   + PVD, aortic or cerebral.       Other Findings:             Anesthesia Plan      general     ASA 4       Induction: intravenous.  arterial line  MIPS: Postoperative opioids intended, Prophylactic antiemetics administered, Postoperative trial extubation and Postoperative ventilation.  Anesthetic plan and risks discussed with child/children (Consent obtained from son (Shaquille Macias)).    Use of blood products discussed with child/children whom consented to blood products.    Plan discussed with CRNA.                  Pedro Mccurdy MD   7/5/2024

## 2024-07-05 NOTE — CONSULTS
Orthopaedic Surgery Consult  (Dr. Marvin)    Time if Evaluation: 1210    CC/Reason for consult: Right femoral neck fracture    HPI:      The patient is a 88 y.o. female who fell from standing height over 1 day ago in her kitchen.  Patient initially presented to the emergency department in [place] where she was seen in the emergency department for right hip pain.  Plain radiographs were taken which demonstrated a right femoral neck fracture.  Patient was subsequently transferred to [place] emergency department for further evaluation and orthopedic treatment.  Patient was seen resting comfortably in her bed however she has mild dementia and only speaks certain dialect of Pashto.   was attempted to be used however patient could not understand the dialect so majority of her questions were translated through her son over the phone who is her POA and through chart review.  Patient did not hit her head and does not admit to pain to any of her other extremities at this time.  She has had no recent fever, chills, chest pain, or shortness of breath.  She denies any new numbness, tingling or weakness.  Patient normally ambulates with a walker.  Other than her right hip pain she has no other orthopedic complaints or concerns at this time.    Patient is on Plavix, aspirin, and multiple other medications for her diabetes, and coronary artery disease.  Patient also has hypertension, hyperlipidemia and Parkinson features.  There is no significant surgical history in patient's chart or from history given by her son.    Past Medical History:    Past Medical History:   Diagnosis Date    Fall 09/23/2023     Past Surgical History:    No past surgical history on file.  Medications Prior to Admission:   Prior to Admission medications    Medication Sig Start Date End Date Taking? Authorizing Provider   clopidogrel (PLAVIX) 75 MG tablet Take 1 tablet by mouth daily 2/5/23   Tigre Agustin MD   vitamin B-12

## 2024-07-05 NOTE — DISCHARGE INSTRUCTIONS
Continue taking medications as prescribed, follow up with PCP and cardiologist as outpatient  Follow up for lung nodule/esophagitis seen on imaging as outpatient    Orthopaedic Instructions:  -Weight bearing status: Weight bearing as tolerated with the right leg  -Posterior hip precautions  Posterior Hip Precautions   Don’t bend your hip past a 90 degree angle.   Don’t cross your legs.   Don’t twist your hip inwards- keep knees and toes pointed upwards.  -Do not remove Optifoam bandage (the large sealed \"Band-aid\"-like dressing) until your follow-up date in clinic. It is okay to shower with the Optifoam bandage. Should it fall off, replace with band-aids or gauze & tape until dry. It is still okay to shower if it falls off, but avoid baths and underwater submersion.  -Always look for signs of compartment syndrome: pain out of proportion to the injury, pain not controlled with pain medication, numbness in digits, changing of color of digits (paleness). If these signs occur return to ED immediately for reassessment.  -Ice (20 minutes on and off 1 hour) to reduce swelling and throbbing pain.  -Call the office or come to Emergency Room if signs of infection appear (hot, swollen, red, draining pus, fever).  -Take medications as prescribed.  -Follow up with Dr. Marvin in his office 7/24/24 at 2:15pm. Call [phone] to schedule/confirm or with any questions/concerns.

## 2024-07-05 NOTE — PROGRESS NOTES
[email or web address]    [facility]   IN-PATIENT SERVICE   [facility]    Progress Note    7/5/2024    4:46 PM    Name:   Eliz Macias  MRN:     [id number]     Acct:      [id number]   Room:   [facility] OR [place] [facility]/NONE  IP Day:  1  Admit Date:  7/4/2024 10:41 PM    PCP:   Lee Sainz MD  Code Status:  Prior    Subjective:     C/C: No chief complaint on file.  Femoral fracture  Interval History Status: not changed.     Seen at bedside, hemodynamically stable, currently on 3 L nasal cannula does not look in distress  Labs reviewed, continues to be on IV fluids, n.p.o. for surgery  Cardiology service on board, cleared for hip surgery today    Brief History:     Per my colleague   \"Eliz Macias is a 88 y.o. Non- / non  female who presents with fall resulted in fracture transferred for surgical intervention   and is admitted to the hospital for the management of Fracture of unspecified part of neck of right femur, initial encounter for closed fracture (HCC).     88-year-old female Mongolian speaking with past medical history of diabetes mellitus type 2, CAD heart attack 4 years ago on aspirin and Plavix, hypertension, hyperlipidemia presented to ED at outside hospital after a mechanical fall on 7/3 around 6 PM in the living room.  Since then patient has been non weightbearing on her right hip.  No loss of consciousness denies hitting her head.  On the morning of 7/4 patient tried  to ambulate but she could not and then presented to ED.     On my evaluation  was called, despite multiple attempts patient was not understanding Amharic through .  Family was also not at bedside.  She intermittently nods her head to some of the question but unable to get any history from her.  Patient was not in pain.  Patient coughed when I was present there and also patient noted to be slightly increased work of breathing.     While in ED patient was noted to be hypoxic 86% on room  PORTABLE    Result Date: 7/4/2024  Hypoventilation with probable basilar atelectasis or chronic change. No clear cut vascular congestion, consolidation or large pleural effusion.       Physical Examination:        General appearance:  alert, cooperative, in no acute distress  Mental Status: Unable to understand language barrier  Lungs:  clear to auscultation bilaterally, normal effort  Heart:  regular rate and rhythm, no murmur  Abdomen:  soft, nontender, nondistended, normal bowel sounds, no masses, hepatomegaly, splenomegaly  Extremities:  no edema, redness, tenderness in the calves  Skin:  no gross lesions, rashes, induration    Assessment:        Hospital Problems             Last Modified POA    * (Principal) Fracture of unspecified part of neck of right femur, initial encounter for closed fracture (HCC) 7/4/2024 Yes    Alzheimer's disease with early onset (HCC) 7/5/2024 Yes    Chronic diastolic congestive heart failure (HCC) 7/5/2024 Yes    Coronary artery disease involving native coronary artery of native heart without angina pectoris 7/5/2024 Yes    Essential hypertension 7/5/2024 Yes    ARCENIO (generalized anxiety disorder) 7/5/2024 Yes    Gastroesophageal reflux disease without esophagitis 7/5/2024 Yes    Hyperlipidemia 7/5/2024 Yes    Type 2 diabetes mellitus with hyperglycemia (HCC) 7/5/2024 Yes    Preoperative clearance 7/5/2024 Yes       Plan:        -Management of femoral fracture per orthopedic surgery, hold antiplatelets/anticoagulation today  -Continue supplemental oxygen, positive for rhinovirus, follow-up for lung nodule as outpatient  -Appreciate cardiology service recommendations, cleared for surgery today, echo pending, will resume diuretics as tolerated,  -Continue IV fluids for now  -Resume home antihypertensives as tolerated  -Currently n.p.o., will start on diabetic diet once back from surgery  -Resume rest of the home meds once patient starts eating    Celine Martinez Sra, MD  7/5/2024  4:46

## 2024-07-05 NOTE — PROGRESS NOTES
Physical Therapy        Physical Therapy Cancel Note      DATE: 2024    NAME: Eliz Macias  MRN: [id number]   : 1935      Patient not seen this date for Physical Therapy due to:    Surgery/Procedure: Plan for OR 2024 with Dr. Marvin for right hip hemiarthroplasty pending risk stratification per chart. Check back post OP for PT evaluation.        Electronically signed by ELENA CORTEZ on 2024 at 8:00 AM   This treatment/evaluation completed by signing SPT. Signing PT agrees with treatment and documentation.

## 2024-07-05 NOTE — PROGRESS NOTES
Patient returned from OR. Patient is resting quietly with eyes closed. Has no s/s of pain. VS WNL.  Patient on simple mask 6 Liters d/t desat post op.   Family at bedside.

## 2024-07-05 NOTE — CARE COORDINATION
07/05/24 1049   Readmission Assessment   Number of Days since last admission?   (Not a RAC, transfer)   Previous Disposition Other (comment)  (Not a RAC, transfer)   Who is being Interviewed   (Not a RAC, transfer)   What was the patient's/caregiver's perception as to why they think they needed to return back to the hospital? Other (Comment)  (Not a RAC, transfer)   Did you visit your Primary Care Physician after you left the hospital, before you returned this time?   (Not a RAC, transfer)   Did you see a specialist, such as Cardiac, Pulmonary, Orthopedic Physician, etc. after you left the hospital? Other (Comment)  (Not a RAC, transfer)   Who advised the patient to return to the hospital? Other (Comment)  (Not a RAC, transfer)   Does the patient report anything that got in the way of taking their medications?   (Not a RAC, transfer)   In our efforts to provide the best possible care to you and others like you, can you think of anything that we could have done to help you after you left the hospital the first time, so that you might not have needed to return so soon? Other (Comment)  (Not a RAC, transfer)

## 2024-07-05 NOTE — PROGRESS NOTES
PHARMACY NOTE    Eliz Macias was ordered the oral bisphosphonate, Fosamax. Oral Bisphosphonates have an increased risk of serious GI events if the strict dosing instructions are not followed. Per the FDA labeling, oral bisphosphonates must be taken at least 30 min (60 min for ibandronate) before the first food, beverage or medication of the day.  Patients MUST also avoid lying down for at least 30 min (60 minutes for ibandronate) after taking the oral bisphosphonate.   Because these strict dosage instructions are difficult to follow in many hospitalized patients, orders for oral bisphosphonate therapy will be discontinued per the [facility] Formulary Committee Policy. Consider risk versus benefits when resuming the oral bisphosphonate at discharge.      Rodrigo Rollins.D.    7/4/2024  10:46 PM

## 2024-07-05 NOTE — ANESTHESIA PROCEDURE NOTES
Arterial Line:    An arterial line was placed using ultrasound guidance and surface landmarks, in the OR for the following indication(s): continuous blood pressure monitoring and blood sampling needed.    A 20 gauge (size), 1 and 3/4 inch (length), Arrow (type) catheter was placed, into the left radial artery, secured by Tegaderm and tape.  Anesthesia type: General    Events:  patient tolerated procedure well with no complications and EBL < 5mL.7/5/2024 2:21 PM  Anesthesiologist: Pedro Mccurdy MD  Resident/CRNA: Olman Mccarthy APRN - CRNA  Performed: Anesthesiologist   Preanesthetic Checklist  Completed: patient identified, IV checked, site marked, risks and benefits discussed, surgical/procedural consents, equipment checked, pre-op evaluation, timeout performed, anesthesia consent given, oxygen available, monitors applied/VS acknowledged, fire risk safety assessment completed and verbalized and blood product R/B/A discussed and consented

## 2024-07-06 ENCOUNTER — APPOINTMENT (OUTPATIENT)
Dept: GENERAL RADIOLOGY | Age: 89
DRG: 521 | End: 2024-07-06
Attending: STUDENT IN AN ORGANIZED HEALTH CARE EDUCATION/TRAINING PROGRAM
Payer: COMMERCIAL

## 2024-07-06 PROBLEM — N30.00 ACUTE CYSTITIS WITHOUT HEMATURIA: Status: ACTIVE | Noted: 2024-07-06

## 2024-07-06 PROBLEM — B34.8 RHINOVIRUS INFECTION: Status: ACTIVE | Noted: 2024-07-06

## 2024-07-06 PROBLEM — J18.9 CAP (COMMUNITY ACQUIRED PNEUMONIA): Status: ACTIVE | Noted: 2024-07-06

## 2024-07-06 LAB
ANION GAP SERPL CALCULATED.3IONS-SCNC: 12 MMOL/L (ref 9–16)
BACTERIA URNS QL MICRO: ABNORMAL
BASOPHILS # BLD: 0.04 K/UL (ref 0–0.2)
BASOPHILS NFR BLD: 0 % (ref 0–2)
BILIRUB UR QL STRIP: NEGATIVE
BUN SERPL-MCNC: 26 MG/DL (ref 8–23)
CALCIUM SERPL-MCNC: 8.2 MG/DL (ref 8.6–10.4)
CHLORIDE SERPL-SCNC: 98 MMOL/L (ref 98–107)
CLARITY UR: ABNORMAL
CO2 SERPL-SCNC: 21 MMOL/L (ref 20–31)
COLOR UR: YELLOW
CREAT SERPL-MCNC: 1.1 MG/DL (ref 0.5–0.9)
EOSINOPHIL # BLD: 0.05 K/UL (ref 0–0.44)
EOSINOPHILS RELATIVE PERCENT: 1 % (ref 1–4)
EPI CELLS #/AREA URNS HPF: ABNORMAL /HPF (ref 0–5)
ERYTHROCYTE [DISTWIDTH] IN BLOOD BY AUTOMATED COUNT: 16.9 % (ref 11.8–14.4)
GFR, ESTIMATED: 50 ML/MIN/1.73M2
GLUCOSE BLD-MCNC: 137 MG/DL (ref 65–105)
GLUCOSE BLD-MCNC: 170 MG/DL (ref 65–105)
GLUCOSE BLD-MCNC: 247 MG/DL (ref 65–105)
GLUCOSE BLD-MCNC: 249 MG/DL (ref 65–105)
GLUCOSE SERPL-MCNC: 133 MG/DL (ref 74–99)
GLUCOSE UR STRIP-MCNC: ABNORMAL MG/DL
HCT VFR BLD AUTO: 44.8 % (ref 36.3–47.1)
HGB BLD-MCNC: 13.1 G/DL (ref 11.9–15.1)
HGB UR QL STRIP.AUTO: ABNORMAL
IMM GRANULOCYTES # BLD AUTO: 0.04 K/UL (ref 0–0.3)
IMM GRANULOCYTES NFR BLD: 0 %
KETONES UR STRIP-MCNC: ABNORMAL MG/DL
LEUKOCYTE ESTERASE UR QL STRIP: ABNORMAL
LYMPHOCYTES NFR BLD: 0.97 K/UL (ref 1.1–3.7)
LYMPHOCYTES RELATIVE PERCENT: 10 % (ref 24–43)
MCH RBC QN AUTO: 26.9 PG (ref 25.2–33.5)
MCHC RBC AUTO-ENTMCNC: 29.2 G/DL (ref 28.4–34.8)
MCV RBC AUTO: 92 FL (ref 82.6–102.9)
MONOCYTES NFR BLD: 0.92 K/UL (ref 0.1–1.2)
MONOCYTES NFR BLD: 9 % (ref 3–12)
MUCOUS THREADS URNS QL MICRO: ABNORMAL
NEUTROPHILS NFR BLD: 79 % (ref 36–65)
NEUTS SEG NFR BLD: 7.77 K/UL (ref 1.5–8.1)
NITRITE UR QL STRIP: NEGATIVE
NRBC BLD-RTO: 0 PER 100 WBC
PH UR STRIP: 5.5 [PH] (ref 5–8)
PLATELET # BLD AUTO: 261 K/UL (ref 138–453)
PMV BLD AUTO: 10.7 FL (ref 8.1–13.5)
POTASSIUM SERPL-SCNC: 4.9 MMOL/L (ref 3.7–5.3)
PROT UR STRIP-MCNC: ABNORMAL MG/DL
RBC # BLD AUTO: 4.87 M/UL (ref 3.95–5.11)
RBC # BLD: ABNORMAL 10*6/UL
RBC #/AREA URNS HPF: ABNORMAL /HPF (ref 0–2)
SODIUM SERPL-SCNC: 131 MMOL/L (ref 136–145)
SP GR UR STRIP: 1.03 (ref 1–1.03)
UROBILINOGEN UR STRIP-ACNC: NORMAL EU/DL (ref 0–1)
WBC #/AREA URNS HPF: ABNORMAL /HPF (ref 0–5)
WBC OTHER # BLD: 9.8 K/UL (ref 3.5–11.3)

## 2024-07-06 PROCEDURE — 85025 COMPLETE CBC W/AUTO DIFF WBC: CPT

## 2024-07-06 PROCEDURE — 6360000002 HC RX W HCPCS: Performed by: STUDENT IN AN ORGANIZED HEALTH CARE EDUCATION/TRAINING PROGRAM

## 2024-07-06 PROCEDURE — 6370000000 HC RX 637 (ALT 250 FOR IP): Performed by: CHIROPRACTOR

## 2024-07-06 PROCEDURE — 99232 SBSQ HOSP IP/OBS MODERATE 35: CPT | Performed by: STUDENT IN AN ORGANIZED HEALTH CARE EDUCATION/TRAINING PROGRAM

## 2024-07-06 PROCEDURE — 36415 COLL VENOUS BLD VENIPUNCTURE: CPT

## 2024-07-06 PROCEDURE — 97167 OT EVAL HIGH COMPLEX 60 MIN: CPT

## 2024-07-06 PROCEDURE — 71045 X-RAY EXAM CHEST 1 VIEW: CPT

## 2024-07-06 PROCEDURE — 2700000000 HC OXYGEN THERAPY PER DAY

## 2024-07-06 PROCEDURE — 87086 URINE CULTURE/COLONY COUNT: CPT

## 2024-07-06 PROCEDURE — 6360000002 HC RX W HCPCS: Performed by: CHIROPRACTOR

## 2024-07-06 PROCEDURE — 97163 PT EVAL HIGH COMPLEX 45 MIN: CPT

## 2024-07-06 PROCEDURE — 97116 GAIT TRAINING THERAPY: CPT

## 2024-07-06 PROCEDURE — 80048 BASIC METABOLIC PNL TOTAL CA: CPT

## 2024-07-06 PROCEDURE — 2580000003 HC RX 258: Performed by: CHIROPRACTOR

## 2024-07-06 PROCEDURE — 81001 URINALYSIS AUTO W/SCOPE: CPT

## 2024-07-06 PROCEDURE — 97530 THERAPEUTIC ACTIVITIES: CPT

## 2024-07-06 PROCEDURE — 51798 US URINE CAPACITY MEASURE: CPT

## 2024-07-06 PROCEDURE — 82947 ASSAY GLUCOSE BLOOD QUANT: CPT

## 2024-07-06 PROCEDURE — 6370000000 HC RX 637 (ALT 250 FOR IP): Performed by: STUDENT IN AN ORGANIZED HEALTH CARE EDUCATION/TRAINING PROGRAM

## 2024-07-06 PROCEDURE — 94761 N-INVAS EAR/PLS OXIMETRY MLT: CPT

## 2024-07-06 PROCEDURE — 2060000000 HC ICU INTERMEDIATE R&B

## 2024-07-06 PROCEDURE — 51701 INSERT BLADDER CATHETER: CPT

## 2024-07-06 RX ORDER — LEVOFLOXACIN 5 MG/ML
750 INJECTION, SOLUTION INTRAVENOUS EVERY 24 HOURS
Status: DISCONTINUED | OUTPATIENT
Start: 2024-07-06 | End: 2024-07-06

## 2024-07-06 RX ORDER — INSULIN LISPRO 100 [IU]/ML
0-8 INJECTION, SOLUTION INTRAVENOUS; SUBCUTANEOUS
Status: DISCONTINUED | OUTPATIENT
Start: 2024-07-06 | End: 2024-07-06

## 2024-07-06 RX ORDER — INSULIN LISPRO 100 [IU]/ML
0-4 INJECTION, SOLUTION INTRAVENOUS; SUBCUTANEOUS NIGHTLY
Status: DISCONTINUED | OUTPATIENT
Start: 2024-07-06 | End: 2024-07-06

## 2024-07-06 RX ORDER — LEVOFLOXACIN 5 MG/ML
500 INJECTION, SOLUTION INTRAVENOUS
Status: DISCONTINUED | OUTPATIENT
Start: 2024-07-06 | End: 2024-07-08

## 2024-07-06 RX ORDER — INSULIN LISPRO 100 [IU]/ML
0-8 INJECTION, SOLUTION INTRAVENOUS; SUBCUTANEOUS
Status: DISCONTINUED | OUTPATIENT
Start: 2024-07-06 | End: 2024-07-17 | Stop reason: HOSPADM

## 2024-07-06 RX ORDER — LEVOFLOXACIN 5 MG/ML
250 INJECTION, SOLUTION INTRAVENOUS
Status: DISCONTINUED | OUTPATIENT
Start: 2024-07-06 | End: 2024-07-08

## 2024-07-06 RX ADMIN — CARBIDOPA AND LEVODOPA 1 TABLET: 25; 100 TABLET, EXTENDED RELEASE ORAL at 08:21

## 2024-07-06 RX ADMIN — BUSPIRONE HYDROCHLORIDE 5 MG: 5 TABLET ORAL at 08:21

## 2024-07-06 RX ADMIN — ACETAMINOPHEN 650 MG: 325 TABLET ORAL at 18:33

## 2024-07-06 RX ADMIN — DESMOPRESSIN ACETATE 40 MG: 0.2 TABLET ORAL at 08:21

## 2024-07-06 RX ADMIN — RIVASTIGMINE TARTRATE 3 MG: 3 CAPSULE ORAL at 20:29

## 2024-07-06 RX ADMIN — DOCUSATE SODIUM 100 MG: 100 CAPSULE, LIQUID FILLED ORAL at 08:21

## 2024-07-06 RX ADMIN — CARBIDOPA AND LEVODOPA 1 TABLET: 25; 100 TABLET, EXTENDED RELEASE ORAL at 20:29

## 2024-07-06 RX ADMIN — LEVOFLOXACIN 250 MG: 250 INJECTION, SOLUTION INTRAVENOUS at 21:48

## 2024-07-06 RX ADMIN — METOPROLOL TARTRATE 25 MG: 25 TABLET ORAL at 20:29

## 2024-07-06 RX ADMIN — Medication 1000 MCG: at 08:25

## 2024-07-06 RX ADMIN — MORPHINE SULFATE 2 MG: 2 INJECTION, SOLUTION INTRAMUSCULAR; INTRAVENOUS at 05:53

## 2024-07-06 RX ADMIN — INSULIN LISPRO 2 UNITS: 100 INJECTION, SOLUTION INTRAVENOUS; SUBCUTANEOUS at 15:47

## 2024-07-06 RX ADMIN — MORPHINE SULFATE 2 MG: 2 INJECTION, SOLUTION INTRAMUSCULAR; INTRAVENOUS at 20:29

## 2024-07-06 RX ADMIN — METOPROLOL TARTRATE 25 MG: 25 TABLET ORAL at 08:21

## 2024-07-06 RX ADMIN — EZETIMIBE 10 MG: 10 TABLET ORAL at 08:21

## 2024-07-06 RX ADMIN — SODIUM CHLORIDE, PRESERVATIVE FREE 10 ML: 5 INJECTION INTRAVENOUS at 08:38

## 2024-07-06 RX ADMIN — SODIUM CHLORIDE, PRESERVATIVE FREE 10 ML: 5 INJECTION INTRAVENOUS at 20:47

## 2024-07-06 RX ADMIN — BUSPIRONE HYDROCHLORIDE 5 MG: 5 TABLET ORAL at 20:29

## 2024-07-06 RX ADMIN — TROSPIUM CHLORIDE 20 MG: 20 TABLET, FILM COATED ORAL at 20:29

## 2024-07-06 RX ADMIN — RIVASTIGMINE TARTRATE 3 MG: 3 CAPSULE ORAL at 08:25

## 2024-07-06 RX ADMIN — INSULIN LISPRO 2 UNITS: 100 INJECTION, SOLUTION INTRAVENOUS; SUBCUTANEOUS at 11:44

## 2024-07-06 RX ADMIN — MORPHINE SULFATE 2 MG: 2 INJECTION, SOLUTION INTRAMUSCULAR; INTRAVENOUS at 12:50

## 2024-07-06 RX ADMIN — SERTRALINE HYDROCHLORIDE 50 MG: 50 TABLET ORAL at 08:22

## 2024-07-06 RX ADMIN — LEVOFLOXACIN 500 MG: 5 INJECTION, SOLUTION INTRAVENOUS at 20:34

## 2024-07-06 RX ADMIN — ACETAMINOPHEN 650 MG: 325 TABLET ORAL at 08:21

## 2024-07-06 RX ADMIN — Medication 2000 MG: at 06:30

## 2024-07-06 RX ADMIN — LEVOTHYROXINE SODIUM 100 MCG: 100 TABLET ORAL at 06:30

## 2024-07-06 ASSESSMENT — PAIN DESCRIPTION - ORIENTATION
ORIENTATION: LEFT
ORIENTATION: RIGHT
ORIENTATION: RIGHT
ORIENTATION: ANTERIOR

## 2024-07-06 ASSESSMENT — PAIN SCALES - WONG BAKER
WONGBAKER_NUMERICALRESPONSE: HURTS LITTLE MORE
WONGBAKER_NUMERICALRESPONSE: NO HURT
WONGBAKER_NUMERICALRESPONSE: HURTS LITTLE MORE

## 2024-07-06 ASSESSMENT — PAIN - FUNCTIONAL ASSESSMENT: PAIN_FUNCTIONAL_ASSESSMENT: ACTIVITIES ARE NOT PREVENTED

## 2024-07-06 ASSESSMENT — PAIN DESCRIPTION - LOCATION
LOCATION: LEG
LOCATION: HEAD
LOCATION: HIP
LOCATION: HIP

## 2024-07-06 ASSESSMENT — PAIN SCALES - GENERAL
PAINLEVEL_OUTOF10: 3
PAINLEVEL_OUTOF10: 3
PAINLEVEL_OUTOF10: 0
PAINLEVEL_OUTOF10: 8
PAINLEVEL_OUTOF10: 7
PAINLEVEL_OUTOF10: 4
PAINLEVEL_OUTOF10: 3
PAINLEVEL_OUTOF10: 2
PAINLEVEL_OUTOF10: 4

## 2024-07-06 ASSESSMENT — PAIN DESCRIPTION - DESCRIPTORS
DESCRIPTORS: ACHING

## 2024-07-06 NOTE — PROGRESS NOTES
Pharmacy Note     Renal Dose Adjustment    Eliz Macias is a 88 y.o. female. Pharmacist assessment of renally cleared medications.    Recent Labs     07/05/24  0151 07/06/24  0440   BUN 23 26*       Recent Labs     07/05/24  0151 07/06/24  0440   CREATININE 1.1* 1.1*       Estimated Creatinine Clearance: 35 mL/min (A) (based on SCr of 1.1 mg/dL (H)).      Height:   Ht Readings from Last 1 Encounters:   07/05/24 1.651 m (5' 5\")     Weight:  Wt Readings from Last 1 Encounters:   07/05/24 69.9 kg (154 lb)       The following medication dose has been adjusted based upon renal function per P&T Guidelines:             Levofloxacin adjusted to 750mg q48 for renal function.    -All RollinsD, BCPS 7/6/2024 7:35 PM

## 2024-07-06 NOTE — PROGRESS NOTES
Writer to room with . Writer chose Icelandic  per language listed on chart.  was not able to understand patient. Writer was connected to another  who was able to communicate with patient in Paraguayan, however patient would only intermittently respond appropriately. Pt was alert and oriented to self and place. Unable to discern orientation status further. Pt was able to follow commands. Writer updated language in chart to reflect Paraguayan as pt's spoken language.

## 2024-07-06 NOTE — PROGRESS NOTES
Writer notified Dr. Woo of urinary retention as well as increased agitation. Pt pulling at leads and electrodes. OK to take telemetry off per Dr. Woo.    1530: Straight cath with 700 mL out. PVR 0 mL.

## 2024-07-06 NOTE — PROGRESS NOTES
[facility]    [facility]   IN-PATIENT SERVICE   [facility]    Progress Note    7/6/2024    7:35 PM    Name:   Eliz Macias  MRN:     [id number]     Acct:      [id number]   Room:   0405/0405-01   Day:  2  Admit Date:  7/4/2024 10:41 PM    PCP:   Lee Sainz MD  Code Status:  Prior    Subjective:     C/C: No chief complaint on file.  Femoral fracture  Interval History Status: not changed.     Seen at bedside, hemodynamically stable, currently on 3 L nasal cannula does not look in distress  S/p right hip arthroplasty  Labs reviewed,   CXR reviewed, urine analysis reviewed, started on levaquin   Echo reviewed    Brief History:     Per my colleague   \"Eliz Macias is a 88 y.o. Non- / non  female who presents with fall resulted in fracture transferred for surgical intervention   and is admitted to the hospital for the management of Fracture of unspecified part of neck of right femur, initial encounter for closed fracture (HCC).     88-year-old female Italian speaking with past medical history of diabetes mellitus type 2, CAD heart attack 4 years ago on aspirin and Plavix, hypertension, hyperlipidemia presented to ED at outside hospital after a mechanical fall on 7/3 around 6 PM in the living room.  Since then patient has been non weightbearing on her right hip.  No loss of consciousness denies hitting her head.  On the morning of 7/4 patient tried  to ambulate but she could not and then presented to ED.     On my evaluation  was called, despite multiple attempts patient was not understanding Portuguese through .  Family was also not at bedside.  She intermittently nods her head to some of the question but unable to get any history from her.  Patient was not in pain.  Patient coughed when I was present there and also patient noted to be slightly increased work of breathing.     While in ED patient was noted to be hypoxic 86% on room air placed on  --    ALT 5  --  <5*  --   --   --   --   --   --   --    ALKPHOS 95  --  76  --   --   --   --   --   --   --    BILITOT 0.4  --  0.3  --   --   --   --   --   --   --    POCGLU  --    < >  --    < > 101 77 214* 137* 249* 247*    < > = values in this interval not displayed.     ABG:  Lab Results   Component Value Date/Time    FIO2 INFORMATION NOT PROVIDED 02/03/2023 10:27 PM     No results found for: \"SPECIAL\"  No results found for: \"CULTURE\"    Radiology:  XR FEMUR RIGHT (MIN 2 VIEWS)    Result Date: 7/5/2024  1. Redemonstration of mildly displaced subcapital fracture of the neck of the right femur. 2. No additional fracture or focal abnormality in the rest of the right femur. 3. Right knee joint is intact.     CT CHEST PULMONARY EMBOLISM W CONTRAST    Result Date: 7/5/2024  1. No evidence of pulmonary embolism. 2. A 1.4 cm right middle lobe pulmonary nodule has increased in size from 1.1 cm on 02/03/2023.  PET-CT may be considered for further evaluation on a nonemergent basis. 3. There is circumferential wall thickening of the distal esophagus, as can be seen in esophagitis.  There is upstream fluid-filled distension of the esophagus.  Consider EGD to rule out an underlying mass if clinically indicated.     XR HIP 2-3 VW W PELVIS RIGHT    Result Date: 7/4/2024  Acute slightly displaced right femoral neck fracture.     XR CHEST PORTABLE    Result Date: 7/4/2024  Hypoventilation with probable basilar atelectasis or chronic change. No clear cut vascular congestion, consolidation or large pleural effusion.       Physical Examination:        General appearance:  alert, cooperative, in no acute distress  Mental Status: Unable to understand language barrier  Lungs:  clear to auscultation bilaterally, normal effort  Heart:  regular rate and rhythm, no murmur  Abdomen:  soft, nontender, nondistended, normal bowel sounds, no masses, hepatomegaly, splenomegaly  Extremities:  no edema, redness, tenderness in the

## 2024-07-06 NOTE — PROGRESS NOTES
Occupational Therapy  Facility/Department: [address] STEPDOWN  Occupational Therapy Initial Assessment    Name: Eliz Macias  : 1935  MRN: [id number]  Date of Service: 2024    Chief Complaint   Patient presents with    Fall       Discharge Recommendations:  Patient would benefit from continued therapy after discharge  OT Equipment Recommendations  Other: CTA once home set up known       Patient Diagnosis(es): The encounter diagnosis was Shortness of breath.  Past Medical History:  has a past medical history of CAD (coronary artery disease), Dementia (HCC), Diabetes mellitus (HCC), Fall, Hyperlipidemia, Hypertension, and Parkinsonian features.  Past Surgical History:  has a past surgical history that includes hip surgery (Right, 2024).           Assessment   Performance deficits / Impairments: Decreased functional mobility ;Decreased ADL status;Decreased safe awareness;Decreased cognition;Decreased high-level IADLs;Decreased balance;Decreased endurance  Assessment: Pt requires significant 2 person assist this date and intermittently following commands/responding to questions complicating assessment. Pt was able to stand this date with use of RW and demo a few side steps to HOB however requires significant 2 person assist and Max cuing to perform. Unknown PLOF or home set up/support avaliable as no family at bedside for assessment this date. Pt limited by above deficits impacting functional performance. Pt would benefit from continued therapy prior to and following discharge from acute setting to increase safety and IND in self care.  Prognosis: Good  Decision Making: High Complexity  REQUIRES OT FOLLOW-UP: Yes  Activity Tolerance  Activity Tolerance: Treatment limited secondary to decreased cognition        Plan   Occupational Therapy Plan  Times Per Week: 3-5x/week  Current Treatment Recommendations: Functional mobility training, Endurance training, Safety education & training, Patient/Caregiver  decreased patient safety and independence with functional mobility requiring skilled physical assistance of two professionals to simultaneously address individualized discipline goals. OT is addressing ADL's and activity tolerance , while PT is addressing their individualized functional mobility task.    Rosi Russell OTR/L

## 2024-07-06 NOTE — PROGRESS NOTES
Physical Therapy  Facility/Department: [address] STEPDOWN  Physical Therapy Initial Assessment    Name: Eliz Macias  : 1935  MRN: [id number]  Date of Service: 2024       88-year-old female Bermudian speaking with past medical history of diabetes mellitus type 2, CAD heart attack 4 years ago on aspirin and Plavix, hypertension, hyperlipidemia presented to ED at outside hospital after a mechanical fall on 7/3 around 6 PM in the living room.  Since then patient has been non weightbearing on her right hip.  No loss of consciousness denies hitting her head.  On the morning of  patient tried  to ambulate but she could not and then presented to ED.     Procedure:  Right Hip Hemiarthroplasty (posterior approach) 24 , abductor pillow    Discharge Recommendations:  Patient would benefit from continued therapy after discharge   PT Equipment Recommendations  Equipment Needed: No  Other: Pt states familiar with rolling walker as she has one      Patient Diagnosis(es): The encounter diagnosis was Shortness of breath.  Past Medical History:  has a past medical history of CAD (coronary artery disease), Dementia (HCC), Diabetes mellitus (HCC), Fall, Hyperlipidemia, Hypertension, and Parkinsonian features.  Past Surgical History:  has a past surgical history that includes hip surgery (Right, 2024).    Assessment   Body Structures, Functions, Activity Limitations Requiring Skilled Therapeutic Intervention: Decreased functional mobility ;Decreased tolerance to work activity;Decreased strength;Decreased endurance;Decreased balance;Increased pain  Assessment: Pt presnts SP rigth POLI after fall with fx, language barrier and questionable cognition. Pt require Max A x 2 to sit from supine, poor understanding of hip precautions with abductor pillow in place. Pt able to take 1 side step with mod A x 2, RW, Pt provided visual instruction on safety and use of RW, education on positioning and able to stand and initiate step

## 2024-07-07 ENCOUNTER — APPOINTMENT (OUTPATIENT)
Dept: GENERAL RADIOLOGY | Age: 89
DRG: 521 | End: 2024-07-07
Attending: STUDENT IN AN ORGANIZED HEALTH CARE EDUCATION/TRAINING PROGRAM
Payer: COMMERCIAL

## 2024-07-07 LAB
ANION GAP SERPL CALCULATED.3IONS-SCNC: 11 MMOL/L (ref 9–16)
BASOPHILS # BLD: 0 K/UL (ref 0–0.2)
BASOPHILS NFR BLD: 0 % (ref 0–2)
BUN SERPL-MCNC: 25 MG/DL (ref 8–23)
CALCIUM SERPL-MCNC: 8 MG/DL (ref 8.6–10.4)
CHLORIDE SERPL-SCNC: 97 MMOL/L (ref 98–107)
CO2 SERPL-SCNC: 22 MMOL/L (ref 20–31)
CREAT SERPL-MCNC: 1 MG/DL (ref 0.5–0.9)
EOSINOPHIL # BLD: 0.15 K/UL (ref 0–0.4)
EOSINOPHILS RELATIVE PERCENT: 2 % (ref 1–4)
ERYTHROCYTE [DISTWIDTH] IN BLOOD BY AUTOMATED COUNT: 16.6 % (ref 11.8–14.4)
GFR, ESTIMATED: 58 ML/MIN/1.73M2
GLUCOSE BLD-MCNC: 137 MG/DL (ref 65–105)
GLUCOSE BLD-MCNC: 173 MG/DL (ref 65–105)
GLUCOSE BLD-MCNC: 214 MG/DL (ref 65–105)
GLUCOSE BLD-MCNC: 288 MG/DL (ref 65–105)
GLUCOSE SERPL-MCNC: 138 MG/DL (ref 74–99)
HCT VFR BLD AUTO: 35.4 % (ref 36.3–47.1)
HGB BLD-MCNC: 10.9 G/DL (ref 11.9–15.1)
IMM GRANULOCYTES # BLD AUTO: 0.07 K/UL (ref 0–0.3)
IMM GRANULOCYTES NFR BLD: 1 %
LYMPHOCYTES NFR BLD: 0.22 K/UL (ref 1–4.8)
LYMPHOCYTES RELATIVE PERCENT: 3 % (ref 24–44)
MCH RBC QN AUTO: 27.5 PG (ref 25.2–33.5)
MCHC RBC AUTO-ENTMCNC: 30.8 G/DL (ref 28.4–34.8)
MCV RBC AUTO: 89.2 FL (ref 82.6–102.9)
MICROORGANISM SPEC CULT: NO GROWTH
MONOCYTES NFR BLD: 1.24 K/UL (ref 0.1–0.8)
MONOCYTES NFR BLD: 17 % (ref 1–7)
MORPHOLOGY: ABNORMAL
MORPHOLOGY: ABNORMAL
NEUTROPHILS NFR BLD: 77 % (ref 36–66)
NEUTS SEG NFR BLD: 5.62 K/UL (ref 1.8–7.7)
NRBC BLD-RTO: 0 PER 100 WBC
PLATELET # BLD AUTO: 268 K/UL (ref 138–453)
PMV BLD AUTO: 11 FL (ref 8.1–13.5)
POTASSIUM SERPL-SCNC: 4.2 MMOL/L (ref 3.7–5.3)
RBC # BLD AUTO: 3.97 M/UL (ref 3.95–5.11)
SODIUM SERPL-SCNC: 130 MMOL/L (ref 136–145)
SPECIMEN DESCRIPTION: NORMAL
WBC OTHER # BLD: 7.3 K/UL (ref 3.5–11.3)

## 2024-07-07 PROCEDURE — 51701 INSERT BLADDER CATHETER: CPT

## 2024-07-07 PROCEDURE — 6360000002 HC RX W HCPCS: Performed by: CHIROPRACTOR

## 2024-07-07 PROCEDURE — 36415 COLL VENOUS BLD VENIPUNCTURE: CPT

## 2024-07-07 PROCEDURE — 51702 INSERT TEMP BLADDER CATH: CPT

## 2024-07-07 PROCEDURE — 6360000002 HC RX W HCPCS: Performed by: NURSE PRACTITIONER

## 2024-07-07 PROCEDURE — 2060000000 HC ICU INTERMEDIATE R&B

## 2024-07-07 PROCEDURE — 85025 COMPLETE CBC W/AUTO DIFF WBC: CPT

## 2024-07-07 PROCEDURE — 6370000000 HC RX 637 (ALT 250 FOR IP): Performed by: CHIROPRACTOR

## 2024-07-07 PROCEDURE — 99232 SBSQ HOSP IP/OBS MODERATE 35: CPT | Performed by: STUDENT IN AN ORGANIZED HEALTH CARE EDUCATION/TRAINING PROGRAM

## 2024-07-07 PROCEDURE — 82947 ASSAY GLUCOSE BLOOD QUANT: CPT

## 2024-07-07 PROCEDURE — 6370000000 HC RX 637 (ALT 250 FOR IP): Performed by: STUDENT IN AN ORGANIZED HEALTH CARE EDUCATION/TRAINING PROGRAM

## 2024-07-07 PROCEDURE — 2580000003 HC RX 258: Performed by: CHIROPRACTOR

## 2024-07-07 PROCEDURE — 80048 BASIC METABOLIC PNL TOTAL CA: CPT

## 2024-07-07 PROCEDURE — 73130 X-RAY EXAM OF HAND: CPT

## 2024-07-07 RX ORDER — ASPIRIN 81 MG/1
81 TABLET, CHEWABLE ORAL DAILY
Status: DISCONTINUED | OUTPATIENT
Start: 2024-07-08 | End: 2024-07-12

## 2024-07-07 RX ORDER — ENOXAPARIN SODIUM 100 MG/ML
40 INJECTION SUBCUTANEOUS DAILY
Status: DISCONTINUED | OUTPATIENT
Start: 2024-07-07 | End: 2024-07-12

## 2024-07-07 RX ORDER — ASPIRIN 81 MG/1
81 TABLET, CHEWABLE ORAL 2 TIMES DAILY
Status: DISCONTINUED | OUTPATIENT
Start: 2024-07-07 | End: 2024-07-07

## 2024-07-07 RX ORDER — FUROSEMIDE 20 MG/1
20 TABLET ORAL
Status: DISCONTINUED | OUTPATIENT
Start: 2024-07-08 | End: 2024-07-17 | Stop reason: HOSPADM

## 2024-07-07 RX ORDER — CLOPIDOGREL BISULFATE 75 MG/1
75 TABLET ORAL DAILY
Status: DISCONTINUED | OUTPATIENT
Start: 2024-07-07 | End: 2024-07-17 | Stop reason: HOSPADM

## 2024-07-07 RX ADMIN — DOCUSATE SODIUM 100 MG: 100 CAPSULE, LIQUID FILLED ORAL at 07:39

## 2024-07-07 RX ADMIN — Medication 1000 MCG: at 07:41

## 2024-07-07 RX ADMIN — ACETAMINOPHEN 650 MG: 325 TABLET ORAL at 07:38

## 2024-07-07 RX ADMIN — BUSPIRONE HYDROCHLORIDE 5 MG: 5 TABLET ORAL at 07:39

## 2024-07-07 RX ADMIN — EZETIMIBE 10 MG: 10 TABLET ORAL at 07:39

## 2024-07-07 RX ADMIN — DESMOPRESSIN ACETATE 40 MG: 0.2 TABLET ORAL at 07:39

## 2024-07-07 RX ADMIN — LEVOTHYROXINE SODIUM 100 MCG: 100 TABLET ORAL at 07:39

## 2024-07-07 RX ADMIN — ENOXAPARIN SODIUM 40 MG: 100 INJECTION SUBCUTANEOUS at 07:41

## 2024-07-07 RX ADMIN — SODIUM CHLORIDE, PRESERVATIVE FREE 10 ML: 5 INJECTION INTRAVENOUS at 20:19

## 2024-07-07 RX ADMIN — MORPHINE SULFATE 2 MG: 2 INJECTION, SOLUTION INTRAMUSCULAR; INTRAVENOUS at 20:18

## 2024-07-07 RX ADMIN — INSULIN LISPRO 2 UNITS: 100 INJECTION, SOLUTION INTRAVENOUS; SUBCUTANEOUS at 10:44

## 2024-07-07 RX ADMIN — ACETAMINOPHEN 650 MG: 325 TABLET ORAL at 15:21

## 2024-07-07 RX ADMIN — SODIUM CHLORIDE, PRESERVATIVE FREE 10 ML: 5 INJECTION INTRAVENOUS at 07:42

## 2024-07-07 RX ADMIN — SERTRALINE HYDROCHLORIDE 50 MG: 50 TABLET ORAL at 07:39

## 2024-07-07 RX ADMIN — CARBIDOPA AND LEVODOPA 1 TABLET: 25; 100 TABLET, EXTENDED RELEASE ORAL at 07:38

## 2024-07-07 RX ADMIN — RIVASTIGMINE TARTRATE 3 MG: 3 CAPSULE ORAL at 07:42

## 2024-07-07 RX ADMIN — CLOPIDOGREL BISULFATE 75 MG: 75 TABLET ORAL at 15:21

## 2024-07-07 RX ADMIN — MORPHINE SULFATE 2 MG: 2 INJECTION, SOLUTION INTRAMUSCULAR; INTRAVENOUS at 03:00

## 2024-07-07 RX ADMIN — ASPIRIN 81 MG 81 MG: 81 TABLET ORAL at 10:38

## 2024-07-07 RX ADMIN — METOPROLOL TARTRATE 25 MG: 25 TABLET ORAL at 07:39

## 2024-07-07 RX ADMIN — MORPHINE SULFATE 2 MG: 2 INJECTION, SOLUTION INTRAMUSCULAR; INTRAVENOUS at 10:38

## 2024-07-07 ASSESSMENT — PAIN DESCRIPTION - LOCATION
LOCATION: LEG
LOCATION: HIP

## 2024-07-07 ASSESSMENT — PAIN SCALES - GENERAL
PAINLEVEL_OUTOF10: 3
PAINLEVEL_OUTOF10: 4
PAINLEVEL_OUTOF10: 8
PAINLEVEL_OUTOF10: 5
PAINLEVEL_OUTOF10: 3
PAINLEVEL_OUTOF10: 10
PAINLEVEL_OUTOF10: 4
PAINLEVEL_OUTOF10: 5
PAINLEVEL_OUTOF10: 3
PAINLEVEL_OUTOF10: 10

## 2024-07-07 ASSESSMENT — PAIN DESCRIPTION - ORIENTATION
ORIENTATION: RIGHT

## 2024-07-07 ASSESSMENT — PAIN SCALES - WONG BAKER
WONGBAKER_NUMERICALRESPONSE: HURTS A LITTLE BIT
WONGBAKER_NUMERICALRESPONSE: HURTS LITTLE MORE

## 2024-07-07 ASSESSMENT — PAIN DESCRIPTION - DESCRIPTORS
DESCRIPTORS: ACHING;SORE
DESCRIPTORS: ACHING;DISCOMFORT
DESCRIPTORS: ACHING;SORE;DISCOMFORT

## 2024-07-07 NOTE — PLAN OF CARE
Problem: Discharge Planning  Goal: Discharge to home or other facility with appropriate resources  7/6/2024 2222 by Victoria Mae RN  Outcome: Progressing  7/6/2024 1738 by Fanny Solomon RN  Outcome: Progressing     Problem: Safety - Adult  Goal: Free from fall injury  7/6/2024 2222 by Vitcoria Mae RN  Outcome: Progressing  7/6/2024 1738 by Fanny Solomon RN  Outcome: Progressing     Problem: Skin/Tissue Integrity  Goal: Absence of new skin breakdown  Description: 1.  Monitor for areas of redness and/or skin breakdown  2.  Assess vascular access sites hourly  3.  Every 4-6 hours minimum:  Change oxygen saturation probe site  4.  Every 4-6 hours:  If on nasal continuous positive airway pressure, respiratory therapy assess nares and determine need for appliance change or resting period.  7/6/2024 2222 by Victoria Mae, RN  Outcome: Progressing  7/6/2024 1738 by Fanny Solomon RN  Outcome: Progressing

## 2024-07-07 NOTE — PROGRESS NOTES
Orthopedic Progress Note    Patient:  Eliz Macias  YOB: 1935     88 y.o. female    Subjective:  Patient seen and examined this morning.   service was used for the encounter. No issues overnight per nursing. Pain is well controlled on current regimen. Denies fever, HA, CP, SOB, N/V, dysuria, new numbness/tingling.  Patient was unable to ambulate with physical therapy yesterday.  She will to work with PT/OT prior to discharge    Vitals reviewed, afebrile    Objective:   Vitals:    07/07/24 0330   BP:    Pulse:    Resp: 18   Temp:    SpO2:      Gen: NAD, cooperative    Cardiovascular: Regular rate   Respiratory: No acute respiratory distress, breathing comfortably    Orthopedic Exam  RLE:  Optifoam dressings are C/D/I.  Compartments are soft and is compressible.  Appropriately tender to palpation.  Motor functions grossly intact to the TA/GS/EHL/FHL motor complexes.  Sensation is intact to touch across the SPN/DPN/sural/saphenous/plantar nerve distributions.  Limb is warm and well-perfused with a brisk capillary refill.    Recent Labs     07/05/24  0409 07/06/24  0440 07/07/24  0358   WBC  --    < > 7.3   HGB  --    < > 10.9*   HCT  --    < > 35.4*   PLT  --    < > 268   INR 1.1  --   --    NA  --    < > 130*   K  --    < > 4.2   BUN  --    < > 25*   CREATININE  --    < > 1.0*   GLUCOSE  --    < > 138*    < > = values in this interval not displayed.      Meds:   Abx: Levofloxacin  See rec for complete list    Impression 88 y.o. female being seen for:      -Right femoral neck fracture     Procedure, DOS: 7/5/2024  -Right hip hemiarthroplasty    Plan  - No further plans for orthopedic intervention at this time  - Post-op Ancef q8h x2 doses  - Optifoam on RLE. Do not remove optifoam dressings.  Okay to reinforce/maintain by nursing if necessary.   - WBAT  to the RLE  - Pain control and medical management per primary: Internal medicine    - DVT ppx: EPC. Okay for chemical AC from an  orthopedic perspective. At minimum recommend ASA 81mg BID for 30 days post-op on discharge if medically able.  - Ice/Elevate to control pain and edema  - Diet: Adult diet okay from orthopedic perspective  - Encourage Incentive Spirometry use  - Continue working with PT/OT    - Okay to discharge home from orthopedic perspective pending evaluation by PT and completion of post-op ancef   - F/u with Dr. Marvin on 7/24/24.   - Please page Ortho on call with any questions    Vicente Estrada DO  Orthopedic Surgery Resident, PGY-2  [place], [place]

## 2024-07-07 NOTE — CARE COORDINATION
Transitional planning      Writer to get SNF choices from son today in the case patient may not qualify for ARU , was accepted at [facility], call to facility and spoke with Marybel updated that she is currently max assist x 2, she will look at todays notes for any improvement and follow up tomorrow to get updates.      1400 son at bedside, SNF choices given for [facility] (1st choice) and [facility], home care to [facility] and [address].                   Post Acute Facility/Agency List     Provided child with the following list, the list includes the overall star ratings obtained from CMS per the Medicare Web site (www.Medicare.gov):     [] Long Term Acute Care Facilities  [] Acute Inpatient Rehabilitation Facilities  [x] Skilled Nursing Facilities  [x] Home Care    Provided verbal instructions on how to utilize the QR Code to obtain additional detailed star ratings from www.Medicare.gov     offered to print and provide the detailed list:    []Accepted   [x]Declined    The following printed detailed lists were provided:

## 2024-07-07 NOTE — PROGRESS NOTES
@[email or web address]@    [facility]   IN-PATIENT SERVICE   [facility]    Progress Note    7/7/2024    12:39 PM    Name:   Eliz Macias  MRN:     [id number]     Acct:      [id number]   Room:   0405/0405-01   Day:  3  Admit Date:  7/4/2024 10:41 PM    PCP:   Lee Sainz MD  Code Status:  Prior    Subjective:     C/C: No chief complaint on file.  Femoral fracture  Interval History Status: not changed.     Seen at bedside, hemodynamically stable, currently on 2 L nasal cannula  S/p right hip arthroplasty  Labs reviewed   Hemoglobin 10.9 today  Continues to be on Levaquin  Has Godoy catheter in place for concern of urinary retention  Working on placement    Brief History:     Per my colleague   \"Eliz Macias is a 88 y.o. Non- / non  female who presents with fall resulted in fracture transferred for surgical intervention   and is admitted to the hospital for the management of Fracture of unspecified part of neck of right femur, initial encounter for closed fracture (HCC).     88-year-old female Tajik speaking with past medical history of diabetes mellitus type 2, CAD heart attack 4 years ago on aspirin and Plavix, hypertension, hyperlipidemia presented to ED at outside hospital after a mechanical fall on 7/3 around 6 PM in the living room.  Since then patient has been non weightbearing on her right hip.  No loss of consciousness denies hitting her head.  On the morning of 7/4 patient tried  to ambulate but she could not and then presented to ED.     On my evaluation  was called, despite multiple attempts patient was not understanding Georgian through .  Family was also not at bedside.  She intermittently nods her head to some of the question but unable to get any history from her.  Patient was not in pain.  Patient coughed when I was present there and also patient noted to be slightly increased work of breathing.     While in ED patient was noted to be

## 2024-07-08 ENCOUNTER — APPOINTMENT (OUTPATIENT)
Dept: GENERAL RADIOLOGY | Age: 89
DRG: 521 | End: 2024-07-08
Attending: STUDENT IN AN ORGANIZED HEALTH CARE EDUCATION/TRAINING PROGRAM
Payer: COMMERCIAL

## 2024-07-08 LAB
AMMONIA PLAS-SCNC: 23 UMOL/L (ref 11–51)
ANION GAP SERPL CALCULATED.3IONS-SCNC: 16 MMOL/L (ref 9–16)
BASOPHILS # BLD: 0 K/UL (ref 0–0.2)
BASOPHILS NFR BLD: 0 % (ref 0–2)
BODY TEMPERATURE: 37
BUN SERPL-MCNC: 18 MG/DL (ref 8–23)
CALCIUM SERPL-MCNC: 8.7 MG/DL (ref 8.6–10.4)
CHLORIDE SERPL-SCNC: 99 MMOL/L (ref 98–107)
CO2 SERPL-SCNC: 19 MMOL/L (ref 20–31)
COHGB MFR BLD: 2.1 % (ref 0–5)
CREAT SERPL-MCNC: 0.8 MG/DL (ref 0.5–0.9)
EOSINOPHIL # BLD: 0 K/UL (ref 0–0.44)
EOSINOPHILS RELATIVE PERCENT: 0 % (ref 1–4)
ERYTHROCYTE [DISTWIDTH] IN BLOOD BY AUTOMATED COUNT: 16.7 % (ref 11.8–14.4)
FIO2 ON VENT: ABNORMAL %
GFR, ESTIMATED: 73 ML/MIN/1.73M2
GLUCOSE BLD-MCNC: 166 MG/DL (ref 65–105)
GLUCOSE BLD-MCNC: 184 MG/DL (ref 65–105)
GLUCOSE BLD-MCNC: 190 MG/DL (ref 65–105)
GLUCOSE BLD-MCNC: 207 MG/DL (ref 65–105)
GLUCOSE BLD-MCNC: 223 MG/DL (ref 65–105)
GLUCOSE SERPL-MCNC: 204 MG/DL (ref 74–99)
HCO3 VENOUS: 20.5 MMOL/L (ref 24–30)
HCT VFR BLD AUTO: 38.4 % (ref 36.3–47.1)
HGB BLD-MCNC: 12.2 G/DL (ref 11.9–15.1)
IMM GRANULOCYTES # BLD AUTO: 0.08 K/UL (ref 0–0.3)
IMM GRANULOCYTES NFR BLD: 1 %
LACTIC ACID, WHOLE BLOOD: 1.2 MMOL/L (ref 0.7–2.1)
LYMPHOCYTES NFR BLD: 0.58 K/UL (ref 1.1–3.7)
LYMPHOCYTES RELATIVE PERCENT: 7 % (ref 24–43)
MCH RBC QN AUTO: 26.9 PG (ref 25.2–33.5)
MCHC RBC AUTO-ENTMCNC: 31.8 G/DL (ref 28.4–34.8)
MCV RBC AUTO: 84.8 FL (ref 82.6–102.9)
MONOCYTES NFR BLD: 0.58 K/UL (ref 0.1–1.2)
MONOCYTES NFR BLD: 7 % (ref 3–12)
MORPHOLOGY: ABNORMAL
NEGATIVE BASE EXCESS, VEN: 2.3 MMOL/L (ref 0–2)
NEUTROPHILS NFR BLD: 85 % (ref 36–65)
NEUTS SEG NFR BLD: 7.06 K/UL (ref 1.5–8.1)
NRBC BLD-RTO: 0 PER 100 WBC
O2 SAT, VEN: 96.1 % (ref 60–85)
PCO2 VENOUS: 30.8 MM HG (ref 39–55)
PH VENOUS: 7.44 (ref 7.32–7.42)
PLATELET # BLD AUTO: 242 K/UL (ref 138–453)
PMV BLD AUTO: 11 FL (ref 8.1–13.5)
PO2 VENOUS: 82.2 MM HG (ref 30–50)
POTASSIUM SERPL-SCNC: 3.9 MMOL/L (ref 3.7–5.3)
RBC # BLD AUTO: 4.53 M/UL (ref 3.95–5.11)
SODIUM SERPL-SCNC: 134 MMOL/L (ref 136–145)
WBC OTHER # BLD: 8.3 K/UL (ref 3.5–11.3)

## 2024-07-08 PROCEDURE — 80048 BASIC METABOLIC PNL TOTAL CA: CPT

## 2024-07-08 PROCEDURE — 2580000003 HC RX 258: Performed by: CHIROPRACTOR

## 2024-07-08 PROCEDURE — 6360000002 HC RX W HCPCS: Performed by: NURSE PRACTITIONER

## 2024-07-08 PROCEDURE — 51798 US URINE CAPACITY MEASURE: CPT

## 2024-07-08 PROCEDURE — 2060000000 HC ICU INTERMEDIATE R&B

## 2024-07-08 PROCEDURE — 82805 BLOOD GASES W/O2 SATURATION: CPT

## 2024-07-08 PROCEDURE — 83605 ASSAY OF LACTIC ACID: CPT

## 2024-07-08 PROCEDURE — 6360000002 HC RX W HCPCS: Performed by: STUDENT IN AN ORGANIZED HEALTH CARE EDUCATION/TRAINING PROGRAM

## 2024-07-08 PROCEDURE — 82140 ASSAY OF AMMONIA: CPT

## 2024-07-08 PROCEDURE — 71045 X-RAY EXAM CHEST 1 VIEW: CPT

## 2024-07-08 PROCEDURE — 85025 COMPLETE CBC W/AUTO DIFF WBC: CPT

## 2024-07-08 PROCEDURE — 82947 ASSAY GLUCOSE BLOOD QUANT: CPT

## 2024-07-08 PROCEDURE — 97110 THERAPEUTIC EXERCISES: CPT

## 2024-07-08 PROCEDURE — 97530 THERAPEUTIC ACTIVITIES: CPT

## 2024-07-08 PROCEDURE — 6370000000 HC RX 637 (ALT 250 FOR IP): Performed by: STUDENT IN AN ORGANIZED HEALTH CARE EDUCATION/TRAINING PROGRAM

## 2024-07-08 PROCEDURE — 6360000002 HC RX W HCPCS: Performed by: CHIROPRACTOR

## 2024-07-08 PROCEDURE — 6370000000 HC RX 637 (ALT 250 FOR IP): Performed by: CHIROPRACTOR

## 2024-07-08 PROCEDURE — 36415 COLL VENOUS BLD VENIPUNCTURE: CPT

## 2024-07-08 PROCEDURE — 99232 SBSQ HOSP IP/OBS MODERATE 35: CPT | Performed by: STUDENT IN AN ORGANIZED HEALTH CARE EDUCATION/TRAINING PROGRAM

## 2024-07-08 RX ORDER — TAMSULOSIN HYDROCHLORIDE 0.4 MG/1
0.4 CAPSULE ORAL DAILY
Status: DISCONTINUED | OUTPATIENT
Start: 2024-07-08 | End: 2024-07-17 | Stop reason: HOSPADM

## 2024-07-08 RX ORDER — LEVOFLOXACIN 5 MG/ML
750 INJECTION, SOLUTION INTRAVENOUS
Status: DISCONTINUED | OUTPATIENT
Start: 2024-07-08 | End: 2024-07-08

## 2024-07-08 RX ORDER — OXYCODONE HYDROCHLORIDE 5 MG/1
5 TABLET ORAL EVERY 6 HOURS PRN
Status: DISCONTINUED | OUTPATIENT
Start: 2024-07-08 | End: 2024-07-17 | Stop reason: HOSPADM

## 2024-07-08 RX ORDER — LEVOFLOXACIN 5 MG/ML
750 INJECTION, SOLUTION INTRAVENOUS
Status: DISCONTINUED | OUTPATIENT
Start: 2024-07-08 | End: 2024-07-10

## 2024-07-08 RX ORDER — NALOXONE HYDROCHLORIDE 1 MG/ML
0.4 INJECTION INTRAMUSCULAR; INTRAVENOUS; SUBCUTANEOUS PRN
Status: DISCONTINUED | OUTPATIENT
Start: 2024-07-08 | End: 2024-07-17 | Stop reason: HOSPADM

## 2024-07-08 RX ORDER — LEVOFLOXACIN 750 MG/1
750 TABLET, FILM COATED ORAL EVERY OTHER DAY
Status: DISPENSED | OUTPATIENT
Start: 2024-07-08 | End: 2024-07-14

## 2024-07-08 RX ADMIN — SODIUM CHLORIDE, PRESERVATIVE FREE 10 ML: 5 INJECTION INTRAVENOUS at 08:35

## 2024-07-08 RX ADMIN — MORPHINE SULFATE 2 MG: 2 INJECTION, SOLUTION INTRAMUSCULAR; INTRAVENOUS at 04:16

## 2024-07-08 RX ADMIN — LEVOTHYROXINE SODIUM 100 MCG: 100 TABLET ORAL at 06:33

## 2024-07-08 RX ADMIN — INSULIN LISPRO 2 UNITS: 100 INJECTION, SOLUTION INTRAVENOUS; SUBCUTANEOUS at 08:34

## 2024-07-08 RX ADMIN — LEVOFLOXACIN 750 MG: 5 INJECTION, SOLUTION INTRAVENOUS at 23:41

## 2024-07-08 RX ADMIN — SODIUM CHLORIDE, PRESERVATIVE FREE 10 ML: 5 INJECTION INTRAVENOUS at 21:31

## 2024-07-08 RX ADMIN — SODIUM CHLORIDE: 9 INJECTION, SOLUTION INTRAVENOUS at 23:41

## 2024-07-08 RX ADMIN — ENOXAPARIN SODIUM 40 MG: 100 INJECTION SUBCUTANEOUS at 08:35

## 2024-07-08 RX ADMIN — NALOXONE HYDROCHLORIDE 0.4 MG: 1 INJECTION PARENTERAL at 16:43

## 2024-07-08 ASSESSMENT — PAIN SCALES - GENERAL
PAINLEVEL_OUTOF10: 10
PAINLEVEL_OUTOF10: 0
PAINLEVEL_OUTOF10: 4

## 2024-07-08 NOTE — PROGRESS NOTES
Physical Therapy  Facility/Department: [address] STEPDOWN  Physical Therapy    Name: Eliz Macias  : 1935  MRN: [id number]  Date of Service: 2024    Discharge Recommendations:  Patient would benefit from continued therapy after discharge          Patient Diagnosis(es): The encounter diagnosis was Shortness of breath.  Past Medical History:  has a past medical history of CAD (coronary artery disease), Dementia (HCC), Diabetes mellitus (HCC), Fall, Hyperlipidemia, Hypertension, and Parkinsonian features.  Past Surgical History:  has a past surgical history that includes hip surgery (Right, 2024) and hip surgery (Right, 2024).    Assessment   Body Structures, Functions, Activity Limitations Requiring Skilled Therapeutic Intervention: Decreased functional mobility ;Decreased strength;Decreased endurance;Decreased balance;Increased pain;Decreased posture;Decreased ROM;Decreased safe awareness;Decreased cognition  Assessment: Treatment limited by severe lethargy.  Therapist treated patient with nurse's assistance; nurse observing her lack of awareness and response to passive ROM in her right hip.  Will continue as able.  Requires PT Follow-Up: Yes     Plan   Physical Therapy Plan  General Plan:  (5-6x/wk)  Specific Instructions for Next Treatment: Progress activity and encourage out of bed activity  Current Treatment Recommendations: Strengthening, Balance training, Functional mobility training, Transfer training, Endurance training, Gait training, Neuromuscular re-education, Home exercise program, Safety education & training, Therapeutic activities, Patient/Caregiver education & training  Additional Comments: Family education on assist needed and review hip precautions and furhter Baseline infromation needed from family when able.  Safety Devices  Type of Devices: Bed alarm in place, Call light within reach, Gait belt, Nurse notified, Left in bed, All fall risk precautions in place, Heels elevated for

## 2024-07-08 NOTE — PROGRESS NOTES
@[email or web address]@    [facility]   IN-PATIENT SERVICE   [facility]    Progress Note    7/8/2024    11:20 AM    Name:   Eliz Macias  MRN:     [id number]     Acct:      [id number]   Room:   0405/0405-01   Day:  4  Admit Date:  7/4/2024 10:41 PM    PCP:   Lee Sainz MD  Code Status:  Prior    Subjective:     C/C: No chief complaint on file.  Femoral fracture  Interval History Status: not changed.     Seen at bedside, hemodynamically stable, currently on 2 L nasal cannula  S/p right hip arthroplasty  Patient does not look in distress, however tried communicating through , patient drowsy this morning, although does wake up on sternal rub, received morphine this morning  Labs reviewed  Continues to be on Levaquin  Plan for void trial  Working on placement    Brief History:     Per my colleague   \"Eliz Macias is a 88 y.o. Non- / non  female who presents with fall resulted in fracture transferred for surgical intervention   and is admitted to the hospital for the management of Fracture of unspecified part of neck of right femur, initial encounter for closed fracture (HCC).     88-year-old female Urdu speaking with past medical history of diabetes mellitus type 2, CAD heart attack 4 years ago on aspirin and Plavix, hypertension, hyperlipidemia presented to ED at outside hospital after a mechanical fall on 7/3 around 6 PM in the living room.  Since then patient has been non weightbearing on her right hip.  No loss of consciousness denies hitting her head.  On the morning of 7/4 patient tried  to ambulate but she could not and then presented to ED.     On my evaluation  was called, despite multiple attempts patient was not understanding Greenlandic through .  Family was also not at bedside.  She intermittently nods her head to some of the question but unable to get any history from her.  Patient was not in pain.  Patient coughed when I was  Lucille Downing

## 2024-07-08 NOTE — PROGRESS NOTES
0845: Pt difficult to arouse this AM. Writer to room with Dr. Woo and . Pt will wake to loud speech but quickly falls back asleep. Writer notified Dr. Woo pt had morphine 2mg at 0416 as a possible cause of somnolence. Pt placed on telemetry monitoring.MD also notified of patient's tachycardia and tachypnea  and RR 25.    1132: Dr. Woo notified of pt's continued somnolence. New orders received.    1550: Writer worked with PT to sit patient at side of bed. Pt was very minimally arousable, even with ROM of right hip which is s/p hemiarthroplasty. Writer notified Dr. Woo about decreased LOC. Received order for 0.4 mg IV naloxone which was adminstered at 1643 with no improvement in alertness. Dr. Woo to bedside at 1700. Pt awakes to Dr. Woo voice but falls quickly back asleep.     1700: Pt's son arrived to room. Pt not responsive to son's stimulation. Dr. Woo notified.     1800: Writer asked pt's Son if he would like me to ask provider about possible testing such as a CT head to further rule out cause of altered mental status. Son stated he would think about it for now. Writer also discussed palliative care and code status changes as pt inquired about what would happen if patient does not improve. Writer advised Son to notify nursing staff if he would like to discuss or make code status changes as this would need to be done through him and the provider. Son stated he understood and will speak with his family first.

## 2024-07-09 PROBLEM — Z71.89 ACP (ADVANCE CARE PLANNING): Status: ACTIVE | Noted: 2024-07-09

## 2024-07-09 PROBLEM — F05: Status: ACTIVE | Noted: 2024-07-09

## 2024-07-09 PROBLEM — Z51.5 ENCOUNTER FOR PALLIATIVE CARE: Status: ACTIVE | Noted: 2024-07-09

## 2024-07-09 PROBLEM — F03.B0 MODERATE DEMENTIA WITHOUT BEHAVIORAL DISTURBANCE, PSYCHOTIC DISTURBANCE, MOOD DISTURBANCE, OR ANXIETY (HCC): Status: ACTIVE | Noted: 2024-07-09

## 2024-07-09 PROBLEM — G20.A2 PARKINSON'S DISEASE WITH FLUCTUATING MANIFESTATIONS (HCC): Status: ACTIVE | Noted: 2024-07-09

## 2024-07-09 LAB
GLUCOSE BLD-MCNC: 164 MG/DL (ref 65–105)
GLUCOSE BLD-MCNC: 207 MG/DL (ref 65–105)
GLUCOSE BLD-MCNC: 209 MG/DL (ref 65–105)
GLUCOSE BLD-MCNC: 263 MG/DL (ref 65–105)

## 2024-07-09 PROCEDURE — 2060000000 HC ICU INTERMEDIATE R&B

## 2024-07-09 PROCEDURE — 99232 SBSQ HOSP IP/OBS MODERATE 35: CPT | Performed by: STUDENT IN AN ORGANIZED HEALTH CARE EDUCATION/TRAINING PROGRAM

## 2024-07-09 PROCEDURE — 2580000003 HC RX 258: Performed by: CHIROPRACTOR

## 2024-07-09 PROCEDURE — 6370000000 HC RX 637 (ALT 250 FOR IP): Performed by: CHIROPRACTOR

## 2024-07-09 PROCEDURE — 97530 THERAPEUTIC ACTIVITIES: CPT

## 2024-07-09 PROCEDURE — 2700000000 HC OXYGEN THERAPY PER DAY

## 2024-07-09 PROCEDURE — 6360000002 HC RX W HCPCS: Performed by: NURSE PRACTITIONER

## 2024-07-09 PROCEDURE — 82947 ASSAY GLUCOSE BLOOD QUANT: CPT

## 2024-07-09 PROCEDURE — 51798 US URINE CAPACITY MEASURE: CPT

## 2024-07-09 PROCEDURE — 99222 1ST HOSP IP/OBS MODERATE 55: CPT | Performed by: INTERNAL MEDICINE

## 2024-07-09 PROCEDURE — 6370000000 HC RX 637 (ALT 250 FOR IP): Performed by: STUDENT IN AN ORGANIZED HEALTH CARE EDUCATION/TRAINING PROGRAM

## 2024-07-09 PROCEDURE — 97110 THERAPEUTIC EXERCISES: CPT

## 2024-07-09 PROCEDURE — 94761 N-INVAS EAR/PLS OXIMETRY MLT: CPT

## 2024-07-09 RX ADMIN — INSULIN LISPRO 4 UNITS: 100 INJECTION, SOLUTION INTRAVENOUS; SUBCUTANEOUS at 15:08

## 2024-07-09 RX ADMIN — EZETIMIBE 10 MG: 10 TABLET ORAL at 08:32

## 2024-07-09 RX ADMIN — SODIUM CHLORIDE, PRESERVATIVE FREE 10 ML: 5 INJECTION INTRAVENOUS at 22:07

## 2024-07-09 RX ADMIN — INSULIN LISPRO 2 UNITS: 100 INJECTION, SOLUTION INTRAVENOUS; SUBCUTANEOUS at 07:33

## 2024-07-09 RX ADMIN — SODIUM CHLORIDE, PRESERVATIVE FREE 10 ML: 5 INJECTION INTRAVENOUS at 08:33

## 2024-07-09 RX ADMIN — SERTRALINE HYDROCHLORIDE 50 MG: 50 TABLET ORAL at 08:32

## 2024-07-09 RX ADMIN — Medication 1000 MCG: at 08:31

## 2024-07-09 RX ADMIN — TAMSULOSIN HYDROCHLORIDE 0.4 MG: 0.4 CAPSULE ORAL at 08:32

## 2024-07-09 RX ADMIN — RIVASTIGMINE TARTRATE 3 MG: 3 CAPSULE ORAL at 08:32

## 2024-07-09 RX ADMIN — ASPIRIN 81 MG 81 MG: 81 TABLET ORAL at 08:32

## 2024-07-09 RX ADMIN — DESMOPRESSIN ACETATE 40 MG: 0.2 TABLET ORAL at 08:31

## 2024-07-09 RX ADMIN — METOPROLOL TARTRATE 25 MG: 25 TABLET ORAL at 22:06

## 2024-07-09 RX ADMIN — LEVOTHYROXINE SODIUM 100 MCG: 100 TABLET ORAL at 06:04

## 2024-07-09 RX ADMIN — CARBIDOPA AND LEVODOPA 1 TABLET: 25; 100 TABLET, EXTENDED RELEASE ORAL at 08:32

## 2024-07-09 RX ADMIN — ACETAMINOPHEN 650 MG: 325 TABLET ORAL at 22:10

## 2024-07-09 RX ADMIN — CLOPIDOGREL BISULFATE 75 MG: 75 TABLET ORAL at 08:32

## 2024-07-09 RX ADMIN — RIVASTIGMINE TARTRATE 3 MG: 3 CAPSULE ORAL at 22:07

## 2024-07-09 RX ADMIN — CARBIDOPA AND LEVODOPA 1 TABLET: 25; 100 TABLET, EXTENDED RELEASE ORAL at 22:07

## 2024-07-09 RX ADMIN — DOCUSATE SODIUM 100 MG: 100 CAPSULE, LIQUID FILLED ORAL at 08:32

## 2024-07-09 RX ADMIN — ENOXAPARIN SODIUM 40 MG: 100 INJECTION SUBCUTANEOUS at 08:31

## 2024-07-09 RX ADMIN — BUSPIRONE HYDROCHLORIDE 5 MG: 5 TABLET ORAL at 08:32

## 2024-07-09 RX ADMIN — BUSPIRONE HYDROCHLORIDE 5 MG: 5 TABLET ORAL at 22:07

## 2024-07-09 RX ADMIN — METOPROLOL TARTRATE 25 MG: 25 TABLET ORAL at 08:32

## 2024-07-09 ASSESSMENT — PAIN - FUNCTIONAL ASSESSMENT: PAIN_FUNCTIONAL_ASSESSMENT: ACTIVITIES ARE NOT PREVENTED

## 2024-07-09 NOTE — PROGRESS NOTES
Physical Therapy  Facility/Department: [address] STEPDOWN  Physical Therapy    Name: Eliz Macias  : 1935  MRN: [id number]  Date of Service: 2024    Discharge Recommendations:  Patient would benefit from continued therapy after discharge          Patient Diagnosis(es): The encounter diagnosis was Shortness of breath.  Past Medical History:  has a past medical history of CAD (coronary artery disease), Dementia (HCC), Diabetes mellitus (HCC), Fall, Hyperlipidemia, Hypertension, and Parkinsonian features.  Past Surgical History:  has a past surgical history that includes hip surgery (Right, 2024) and hip surgery (Right, 2024).    Assessment   Body Structures, Functions, Activity Limitations Requiring Skilled Therapeutic Intervention: Decreased functional mobility ;Decreased strength;Decreased endurance;Decreased balance;Increased pain;Decreased posture;Decreased ROM;Decreased safe awareness;Decreased cognition  Assessment: Patient is awake today, following gestural cues.  Unable to hear .  Stood twice, appearing pleased with herself.  Moving right thigh with assistance.  Patient will need further PT to regain functional independence.  Requires PT Follow-Up: Yes     Plan   Physical Therapy Plan  General Plan:  (5-6x/wk)  Specific Instructions for Next Treatment: Progress activity and encourage out of bed activity  Current Treatment Recommendations: Strengthening, Balance training, Functional mobility training, Transfer training, Endurance training, Gait training, Neuromuscular re-education, Home exercise program, Safety education & training, Therapeutic activities, Patient/Caregiver education & training  Additional Comments: Family education on assist needed and review hip precautions and furhter Baseline infromation needed from family when able.  Safety Devices  Type of Devices: Bed alarm in place, Call light within reach, Gait belt, Nurse notified, Left in bed, All fall risk precautions in

## 2024-07-09 NOTE — PLAN OF CARE
Problem: Discharge Planning  Goal: Discharge to home or other facility with appropriate resources  7/9/2024 0919 by Mariela Bui RN  Outcome: Progressing  7/8/2024 2357 by Lanny Vargas RN  Outcome: Progressing  Flowsheets  Taken 7/8/2024 2357  Discharge to home or other facility with appropriate resources:   Identify barriers to discharge with patient and caregiver   Identify discharge learning needs (meds, wound care, etc)   Arrange for needed discharge resources and transportation as appropriate  Taken 7/8/2024 2000  Discharge to home or other facility with appropriate resources:   Identify barriers to discharge with patient and caregiver   Arrange for needed discharge resources and transportation as appropriate   Identify discharge learning needs (meds, wound care, etc)     Problem: Safety - Adult  Goal: Free from fall injury  7/9/2024 0919 by Mariela Bui RN  Outcome: Progressing  7/8/2024 2357 by Lanny Vargas RN  Outcome: Progressing  Flowsheets (Taken 7/8/2024 2357)  Free From Fall Injury: Instruct family/caregiver on patient safety     Problem: Skin/Tissue Integrity  Goal: Absence of new skin breakdown  Description: 1.  Monitor for areas of redness and/or skin breakdown  2.  Assess vascular access sites hourly  3.  Every 4-6 hours minimum:  Change oxygen saturation probe site  4.  Every 4-6 hours:  If on nasal continuous positive airway pressure, respiratory therapy assess nares and determine need for appliance change or resting period.  7/9/2024 0919 by Mariela Bui RN  Outcome: Progressing  7/8/2024 2357 by Lanny Vargas RN  Outcome: Progressing     Problem: ABCDS Injury Assessment  Goal: Absence of physical injury  7/9/2024 0919 by Mariela Bui RN  Outcome: Progressing  7/8/2024 2357 by Lanny Vargas RN  Outcome: Progressing  Flowsheets (Taken 7/8/2024 2357)  Absence of Physical Injury: Implement safety measures based on patient assessment     Problem: Chronic Conditions and

## 2024-07-09 NOTE — ACP (ADVANCE CARE PLANNING)
Writer spoke with son, Deepika, by phone regarding HCPOA. Son confirmed that pt as not completed an HCPOA document. He said pt has a daughter in [place] and a daughter in [place], and one daughter who is . Writer explained that the medical team is required to attempt to speak to all of the children regardless of where they live.  He was unable to give writer the names and phone numbers at time of call. Son plans to arrive today around 1:00 and said he could get his sisters on the phone if needed.     Electronically signed by Chaplain Varun, on 2024 at 8:49 AM.  [facility]  [phone]

## 2024-07-09 NOTE — PLAN OF CARE
Patient somnolent, not following commands, opens eyes to voice at time, responds to pain, not speaking at all. Physicians and family aware. Code status changed to DNR-CCA. Patient repositioned every two hours and as needed. Antibiotic changed from oral to ivpb due to somnolence. PIV at KVO. Hip precautions maintained. Patient on 3LNC, maintaining spo2>90. Plan of care pending.     Problem: Discharge Planning  Goal: Discharge to home or other facility with appropriate resources  7/8/2024 2357 by Lanny Vargas RN  Outcome: Progressing  Flowsheets  Taken 7/8/2024 2357  Discharge to home or other facility with appropriate resources:   Identify barriers to discharge with patient and caregiver   Identify discharge learning needs (meds, wound care, etc)   Arrange for needed discharge resources and transportation as appropriate  Taken 7/8/2024 2000  Discharge to home or other facility with appropriate resources:   Identify barriers to discharge with patient and caregiver   Arrange for needed discharge resources and transportation as appropriate   Identify discharge learning needs (meds, wound care, etc)  7/8/2024 1423 by Fanny Solomon RN  Outcome: Progressing     Problem: Safety - Adult  Goal: Free from fall injury  7/8/2024 2357 by Lanyn Vargas RN  Outcome: Progressing  Flowsheets (Taken 7/8/2024 2357)  Free From Fall Injury: Instruct family/caregiver on patient safety  7/8/2024 1423 by Fanny Solomon RN  Outcome: Progressing     Problem: Skin/Tissue Integrity  Goal: Absence of new skin breakdown  Description: 1.  Monitor for areas of redness and/or skin breakdown  2.  Assess vascular access sites hourly  3.  Every 4-6 hours minimum:  Change oxygen saturation probe site  4.  Every 4-6 hours:  If on nasal continuous positive airway pressure, respiratory therapy assess nares and determine need for appliance change or resting period.  7/8/2024 2357 by Lanny Vargas RN  Outcome: Progressing  7/8/2024 1423 by Juanito

## 2024-07-09 NOTE — ACP (ADVANCE CARE PLANNING)
Advance Care Planning     Advance Care Planning Inpatient Note  Spiritual Care Department    Today's Date: 7/9/2024  Unit: [facility] STEPDOWN    Received request from Other: Spiritual Care Consult .  Upon review of chart and communication with care team, Spiritual Care will defer advance care planning with patient at this time., as patient is listed as having \"Alzheimer's.\" Patient was/were present in the room during visit.    Goals of ACP Conversation:  Discuss advance care planning documents    Health Care Decision Makers:       Primary Decision Maker: Deepika Macias - Child - [phone]  Summary:  Advance Care Planning Documents (Patient Wishes):  Living Will/Advance Directive     Assessment:  Patient was sleeping throughout encounter.    Interventions:   located Living Will document scanned in patient's chart, naming Deepika ([phone]), as her primary contact.    Care Preferences Communicated:   No    Outcomes/Plan:  Patient has Living Will scanned in chart.    Electronically signed by GURWINDER Qureshi on 7/9/2024 at 4:08 AM

## 2024-07-09 NOTE — ACP (ADVANCE CARE PLANNING)
Advance Care Planning      Palliative Medicine Provider (MD/NP)  Advance Care Planning (ACP) Conversation      Date of Conversation: 07/09/24  The patient and/or authorized decision maker consented to a voluntary Advance Care Planning conversation.   Individuals present for the conversation:   Patient, Legal healthcare agent named below, and DIL    Legal Healthcare Agent(s):    Primary Decision Maker: Deepika Macias - [phone]    ACP documents available in EMR prior to discussion:  -Power of  for Healthcare  -Living Will    Primary Palliative Diagnosis(es):  Parkinson's disease  Moderate dementia    Conversation Summary:  DNR-CCA  No intubation  OK for treatment  Son is HCPOA    Resuscitation Status:    Code Status: DNR-CCA    Outcomes / Completed Documentation:  An explanation of advance directives and their importance was provided and the following forms completed:    -Portable DNR    If new document completed, original was provided to patient and/or family member.    Copy was placed for scanning into the [facility] EMR.      I spent 10 minutes providing separately identifiable ACP services with the patient and/or surrogate decision maker in a voluntary, in-person conversation discussing the patient's wishes and goals as detailed in the above note.       LC ALCARAZ, DO

## 2024-07-09 NOTE — CONSULTS
Palliative Care Inpatient Consult    NAME:  Eliz Macias  MEDICAL RECORD NUMBER:  [id number]  AGE: 88 y.o.   GENDER: female  : 1935  TODAY'S DATE:  2024    Reasons for Consultation:    Symptom and/or pain management  Provision of information regarding PC and/or hospice philosophies  Complex, time-intensive communication and interdisciplinary psychosocial support  Clarification of goals of care and/or assistance with difficult decision-making  Guidance in regards to resources and transition(s)    Members of PC team contributing to this consultation are: Deni Rodriguez DO    Plan      Palliative Interaction:  The palliative care team was able to meet with Eliz today on rounds.  Her son, Deepika and his wife are present at bedside.  Patient is sleeping soundly at this time.  Palliative care was consulted to clarify code status/goals of care.    Deepika tells me that yesterday, he thought that his mother was transitioning. Her nonresponsive nature and \"open mouth\" caused significant distress to the patient's family. He called his sisters in  and [place] (both are now flying home) and his family in [place]. He called the Church and the  home as well.    I explained that this was likely hypoactive delirium. It appears that this has since resolved.    Today, the patient is much more awake and alert. She is denying any pain. She is not hungry, but is thirsty. She is coughing after each swallow of liquid.    He and his wife tell me that the patient lives with them.  We reviewed the events leading to her fall.  We reviewed that she is able to ambulate using a walker at baseline.  She needs help with all other ADLs.  Her mentation waxes and wanes.  She is very particular about certain things like her medication, but does not fully grasp issues revolving around her health.    Deepika tells me that he panicked yesterday and spoke with his , as there was no documentation of healthcare power of

## 2024-07-09 NOTE — CARE COORDINATION
Transitional Planning     1638 Left ms at [facility] regarding referral sent.      1641  left for Zainab at [facility] regarding referral.    1645 Spoke to Micaela at [facility], does not take pt's insurance       1658 Call from Zainab at [facility], does not have any beds available now but will review clinical in the meantime.

## 2024-07-09 NOTE — PROGRESS NOTES
@[facility]@    [facility]   IN-PATIENT SERVICE   [facility]    Progress Note    7/9/2024    11:46 AM    Name:   Eliz Macias  MRN:     [id number]     Acct:      [id number]   Room:   0405/0405-01   Day:  5  Admit Date:  7/4/2024 10:41 PM    PCP:   Lee Sainz MD  Code Status:  DNR-CCA    Subjective:     C/C: No chief complaint on file.  Femoral fracture  Interval History Status: not changed.     Seen at bedside, hemodynamically stable, currently on nasal cannula  S/p right hip arthroplasty  Much more awake and alert today  Labs reviewed  Continues to be on Levaquin  Palliative care consulted for CODE STATUS discussion  Working on placement    Brief History:     Per my colleague   \"Eliz Macias is a 88 y.o. Non- / non  female who presents with fall resulted in fracture transferred for surgical intervention   and is admitted to the hospital for the management of Fracture of unspecified part of neck of right femur, initial encounter for closed fracture (HCC).     88-year-old female Tamaz[place] speaking with past medical history of diabetes mellitus type 2, CAD heart attack 4 years ago on aspirin and Plavix, hypertension, hyperlipidemia presented to ED at outside hospital after a mechanical fall on 7/3 around 6 PM in the living room.  Since then patient has been non weightbearing on her right hip.  No loss of consciousness denies hitting her head.  On the morning of 7/4 patient tried  to ambulate but she could not and then presented to ED.     On my evaluation  was called, despite multiple attempts patient was not understanding Romansh through .  Family was also not at bedside.  She intermittently nods her head to some of the question but unable to get any history from her.  Patient was not in pain.  Patient coughed when I was present there and also patient noted to be slightly increased work of breathing.     While in ED patient was noted to be

## 2024-07-09 NOTE — CODE DOCUMENTATION
Patient's son, Deepika Macias, is patient's POA. Patient has remained somnolent throughout the day today. Treatment is ongoing. He expressed to the nurse the desire to change code status to DNRCCA stating that if the patient's heart should stop they do not want CPR performed. I called Deepika via telephone number provided in chart. We discussed patient's current condition, current medical management/treatments and different code status levels. He verbalized understanding and reiterated that at this time he would like the code status changed to DNRCCA. Upon review of the chart, this provider agrees that DNRCCA is appropriate at this time. The son will sign DNRCCA paperwork in the morning. He expressed appreciation for the phone call. Orders updated.

## 2024-07-09 NOTE — PROGRESS NOTES
Son at bedside, son is POA. Son wants to make patient DNR. Av NP was notified and writer spoke to Dr. Fitzpatrick, however son had to go home before Dr. Fitzpatrick could make it to bedside. Son stated he will sign forms in AM to make patient DNR, however would like to be notified if decline in status overnight. Av NP notified.

## 2024-07-09 NOTE — PROGRESS NOTES
visited unit per Spiritual Care Consult for Advance Directives and Code Status. Patient was sleeping at time of visit.  located bedside nurse and learned that patient's family had left for the night. Per nurse, patient's child is primary decision maker and patient's code status is expected to be changed \"in the morning.\" Nurse indicated that a scanned copy of patient's Advance Directive is in chart.  requested that nurse reach out to spiritual care for further support. Chaplains can be reached 24/7 via [employer].    Chaplain Scott     07/08/24 2145   Encounter Summary   Encounter Overview/Reason Initial Encounter   Service Provided For Patient not available   Referral/Consult From Multi-disciplinary team  (Spiritual Care Consult)   Support System Children   Complexity of Encounter Low   Begin Time 2145   End Time  2203   Total Time Calculated 18 min   Spiritual/Emotional needs   Type Spiritual Support   Assessment/Intervention/Outcome   Assessment Unable to assess   Intervention Sustaining Presence/Ministry of presence   Outcome Did not respond   Plan and Referrals   Plan/Referrals Continue to visit, (comment)  (as needed)

## 2024-07-10 LAB
GLUCOSE BLD-MCNC: 195 MG/DL (ref 65–105)
GLUCOSE BLD-MCNC: 240 MG/DL (ref 65–105)
GLUCOSE BLD-MCNC: 267 MG/DL (ref 65–105)
GLUCOSE BLD-MCNC: 269 MG/DL (ref 65–105)

## 2024-07-10 PROCEDURE — 2060000000 HC ICU INTERMEDIATE R&B

## 2024-07-10 PROCEDURE — 6370000000 HC RX 637 (ALT 250 FOR IP): Performed by: CHIROPRACTOR

## 2024-07-10 PROCEDURE — 6370000000 HC RX 637 (ALT 250 FOR IP): Performed by: STUDENT IN AN ORGANIZED HEALTH CARE EDUCATION/TRAINING PROGRAM

## 2024-07-10 PROCEDURE — 6360000002 HC RX W HCPCS: Performed by: NURSE PRACTITIONER

## 2024-07-10 PROCEDURE — 2580000003 HC RX 258: Performed by: CHIROPRACTOR

## 2024-07-10 PROCEDURE — 97535 SELF CARE MNGMENT TRAINING: CPT

## 2024-07-10 PROCEDURE — 82947 ASSAY GLUCOSE BLOOD QUANT: CPT

## 2024-07-10 PROCEDURE — 2700000000 HC OXYGEN THERAPY PER DAY

## 2024-07-10 PROCEDURE — 94761 N-INVAS EAR/PLS OXIMETRY MLT: CPT

## 2024-07-10 PROCEDURE — 99232 SBSQ HOSP IP/OBS MODERATE 35: CPT | Performed by: STUDENT IN AN ORGANIZED HEALTH CARE EDUCATION/TRAINING PROGRAM

## 2024-07-10 PROCEDURE — 6370000000 HC RX 637 (ALT 250 FOR IP): Performed by: NURSE PRACTITIONER

## 2024-07-10 RX ORDER — LEVOFLOXACIN 5 MG/ML
500 INJECTION, SOLUTION INTRAVENOUS EVERY 24 HOURS
Status: DISCONTINUED | OUTPATIENT
Start: 2024-07-10 | End: 2024-07-11

## 2024-07-10 RX ORDER — INSULIN LISPRO 100 [IU]/ML
4 INJECTION, SOLUTION INTRAVENOUS; SUBCUTANEOUS ONCE
Status: COMPLETED | OUTPATIENT
Start: 2024-07-10 | End: 2024-07-10

## 2024-07-10 RX ORDER — INSULIN GLARGINE 100 [IU]/ML
10 INJECTION, SOLUTION SUBCUTANEOUS NIGHTLY
Status: DISCONTINUED | OUTPATIENT
Start: 2024-07-10 | End: 2024-07-17 | Stop reason: HOSPADM

## 2024-07-10 RX ORDER — LEVOFLOXACIN 5 MG/ML
250 INJECTION, SOLUTION INTRAVENOUS EVERY 24 HOURS
Status: DISCONTINUED | OUTPATIENT
Start: 2024-07-10 | End: 2024-07-11

## 2024-07-10 RX ADMIN — SODIUM CHLORIDE, PRESERVATIVE FREE 10 ML: 5 INJECTION INTRAVENOUS at 09:54

## 2024-07-10 RX ADMIN — CARBIDOPA AND LEVODOPA 1 TABLET: 25; 100 TABLET, EXTENDED RELEASE ORAL at 21:00

## 2024-07-10 RX ADMIN — SODIUM CHLORIDE, PRESERVATIVE FREE 10 ML: 5 INJECTION INTRAVENOUS at 21:01

## 2024-07-10 RX ADMIN — BUSPIRONE HYDROCHLORIDE 5 MG: 5 TABLET ORAL at 09:54

## 2024-07-10 RX ADMIN — CLOPIDOGREL BISULFATE 75 MG: 75 TABLET ORAL at 09:55

## 2024-07-10 RX ADMIN — METOPROLOL TARTRATE 25 MG: 25 TABLET ORAL at 21:00

## 2024-07-10 RX ADMIN — RIVASTIGMINE TARTRATE 3 MG: 3 CAPSULE ORAL at 21:00

## 2024-07-10 RX ADMIN — METOPROLOL TARTRATE 25 MG: 25 TABLET ORAL at 09:54

## 2024-07-10 RX ADMIN — LEVOTHYROXINE SODIUM 100 MCG: 100 TABLET ORAL at 07:51

## 2024-07-10 RX ADMIN — LEVOFLOXACIN 250 MG: 250 INJECTION, SOLUTION INTRAVENOUS at 22:25

## 2024-07-10 RX ADMIN — DOCUSATE SODIUM 100 MG: 100 CAPSULE, LIQUID FILLED ORAL at 09:55

## 2024-07-10 RX ADMIN — FUROSEMIDE 20 MG: 20 TABLET ORAL at 09:55

## 2024-07-10 RX ADMIN — CARBIDOPA AND LEVODOPA 1 TABLET: 25; 100 TABLET, EXTENDED RELEASE ORAL at 09:54

## 2024-07-10 RX ADMIN — INSULIN GLARGINE 10 UNITS: 100 INJECTION, SOLUTION SUBCUTANEOUS at 21:01

## 2024-07-10 RX ADMIN — ENOXAPARIN SODIUM 40 MG: 100 INJECTION SUBCUTANEOUS at 09:53

## 2024-07-10 RX ADMIN — TAMSULOSIN HYDROCHLORIDE 0.4 MG: 0.4 CAPSULE ORAL at 09:54

## 2024-07-10 RX ADMIN — ASPIRIN 81 MG 81 MG: 81 TABLET ORAL at 09:55

## 2024-07-10 RX ADMIN — INSULIN LISPRO 8 UNITS: 100 INJECTION, SOLUTION INTRAVENOUS; SUBCUTANEOUS at 21:00

## 2024-07-10 RX ADMIN — Medication 1000 MCG: at 09:53

## 2024-07-10 RX ADMIN — DESMOPRESSIN ACETATE 40 MG: 0.2 TABLET ORAL at 09:54

## 2024-07-10 RX ADMIN — OXYCODONE 5 MG: 5 TABLET ORAL at 21:32

## 2024-07-10 RX ADMIN — INSULIN LISPRO 2 UNITS: 100 INJECTION, SOLUTION INTRAVENOUS; SUBCUTANEOUS at 07:51

## 2024-07-10 RX ADMIN — INSULIN LISPRO 4 UNITS: 100 INJECTION, SOLUTION INTRAVENOUS; SUBCUTANEOUS at 15:20

## 2024-07-10 RX ADMIN — EZETIMIBE 10 MG: 10 TABLET ORAL at 09:54

## 2024-07-10 RX ADMIN — BUSPIRONE HYDROCHLORIDE 5 MG: 5 TABLET ORAL at 21:00

## 2024-07-10 RX ADMIN — RIVASTIGMINE TARTRATE 3 MG: 3 CAPSULE ORAL at 09:53

## 2024-07-10 RX ADMIN — INSULIN LISPRO 4 UNITS: 100 INJECTION, SOLUTION INTRAVENOUS; SUBCUTANEOUS at 22:15

## 2024-07-10 RX ADMIN — LEVOFLOXACIN 500 MG: 5 INJECTION, SOLUTION INTRAVENOUS at 22:12

## 2024-07-10 RX ADMIN — SERTRALINE HYDROCHLORIDE 50 MG: 50 TABLET ORAL at 09:55

## 2024-07-10 ASSESSMENT — PAIN SCALES - GENERAL
PAINLEVEL_OUTOF10: 10
PAINLEVEL_OUTOF10: 0
PAINLEVEL_OUTOF10: 10

## 2024-07-10 ASSESSMENT — PAIN DESCRIPTION - LOCATION: LOCATION: HIP

## 2024-07-10 ASSESSMENT — PAIN DESCRIPTION - ORIENTATION: ORIENTATION: RIGHT

## 2024-07-10 NOTE — PROGRESS NOTES
Occupational Therapy  Facility/Department: [address] STE[address]OWN  Occupational Therapy Daily Treatment Note    Name: Eliz Macias  : 1935  MRN: [id number]  Date of Service: 7/10/2024    Discharge Recommendations:  Patient would benefit from continued therapy after discharge     Patient Diagnosis(es): The encounter diagnosis was Shortness of breath.  Past Medical History:  has a past medical history of CAD (coronary artery disease), Dementia (HCC), Diabetes mellitus (HCC), Fall, Hyperlipidemia, Hypertension, and Parkinsonian features.  Past Surgical History:  has a past surgical history that includes hip surgery (Right, 2024) and hip surgery (Right, 2024).       Assessment   Performance deficits / Impairments: Decreased functional mobility ;Decreased ADL status;Decreased safe awareness;Decreased cognition;Decreased high-level IADLs;Decreased balance;Decreased endurance  Assessment: Unknown PLOF or home set up/support avaliable as no family at bedside for assessment this date. Pt participated with use of gestures with fair responses. Pt facial expressions for pain and patient returned to supine in bed. Please review treatment listed below for statuses. Pt limited by above deficits impacting functional performance. Pt would benefit from continued therapy prior to and following discharge from acute setting to increase safety and IND in self care.  Prognosis: Fair  Activity Tolerance  Activity Tolerance: Patient limited by pain;Treatment limited secondary to decreased cognition  Activity Tolerance Comments: language barrier        Plan   Occupational Therapy Plan  Times Per Week: 3-5x/week  Current Treatment Recommendations: Functional mobility training, Endurance training, Safety education & training, Patient/Caregiver education & training, Pain management, Self-Care / ADL, Home management training, Balance training, Equipment evaluation, education, & procurement    ADL  Grooming: Minimal assistance;Verbal cueing;Increased time to complete;Setup  Grooming Skilled Clinical Factors: Pt completed washing of face 2x with  CAMPBELL provide wash cloth to right hand and gestured for patient. Pt able to follow gesture instruction. Same with combing hair. Pt declined use of mouthwash or oral care when gestures with mouthwash  LE Dressing: Dependent/Total  LE Dressing Skilled Clinical Factors: Don slipper socks  Additional Comments: Pt required encouragement for tasks and redirection via gestures.  Hip abductor pillow in place at start and end of session.  Skin Care: Soap and water        Bed mobility  Supine to Sit: Maximum assistance  Sit to Supine: Maximum assistance  Scooting: Maximal assistance  Bed Mobility Comments: Pt attemptin to assist with tasks this session via gestures. HOB elevated ~45 degrees        Cognition  Overall Cognitive Status: Exceptions  Arousal/Alertness: Impaired;Delayed responses to stimuli;Inconsistent responses to stimuli  Following Commands: Follows one step commands with increased time;Follows one step commands with repetition  Attention Span: Impaired  Memory: Decreased recall of precautions;Decreased recall of biographical Information  Safety Judgement: Impaired  Problem Solving: Impaired  Insights: Decreased awareness of deficits  Initiation: Requires cues for all  Sequencing: Requires cues for all  Cognition Comment: Pt with poor ability to communicate with staff and . CAMPBELL utilized gestures with patient follow ~5-10%  Orientation  Orientation Level: Unable to assess (language and cognition barriers)         Education Given To: Patient  Education Provided: Role of Therapy;ADL Adaptive Strategies;Transfer Training;Precautions  Education Provided Comments: Attempted education via gestures due to patient unable to communicate or follow the .  Education Method: Verbal;Demonstration;  Barriers to Learning: Cognition;Other

## 2024-07-10 NOTE — CARE COORDINATION
Transitional planning    Call placed to [facility], left vm with request for call back.        1500 primary RN updated patients son to make more choices, still has list.

## 2024-07-10 NOTE — PLAN OF CARE
Problem: Discharge Planning  Goal: Discharge to home or other facility with appropriate resources  Outcome: Progressing  Flowsheets (Taken 7/9/2024 2030)  Discharge to home or other facility with appropriate resources: Identify barriers to discharge with patient and caregiver     Problem: Safety - Adult  Goal: Free from fall injury  Outcome: Progressing     Problem: Skin/Tissue Integrity  Goal: Absence of new skin breakdown  Description: 1.  Monitor for areas of redness and/or skin breakdown  2.  Assess vascular access sites hourly  3.  Every 4-6 hours minimum:  Change oxygen saturation probe site  4.  Every 4-6 hours:  If on nasal continuous positive airway pressure, respiratory therapy assess nares and determine need for appliance change or resting period.  Outcome: Progressing     Problem: ABCDS Injury Assessment  Goal: Absence of physical injury  Outcome: Progressing     Problem: Chronic Conditions and Co-morbidities  Goal: Patient's chronic conditions and co-morbidity symptoms are monitored and maintained or improved  Outcome: Progressing  Flowsheets (Taken 7/9/2024 2030)  Care Plan - Patient's Chronic Conditions and Co-Morbidity Symptoms are Monitored and Maintained or Improved: Monitor and assess patient's chronic conditions and comorbid symptoms for stability, deterioration, or improvement     Problem: Pain  Goal: Verbalizes/displays adequate comfort level or baseline comfort level  Outcome: Progressing

## 2024-07-10 NOTE — PROGRESS NOTES
@[facility]@    [facility]   IN-PATIENT SERVICE   [facility]    Progress Note    7/10/2024    4:20 PM    Name:   Eliz Macias  MRN:     [id number]     Acct:      [id number]   Room:   0405/0405-01   Day:  6  Admit Date:  7/4/2024 10:41 PM    PCP:   Lee Sainz MD  Code Status:  DNR-CCA    Subjective:     C/C: No chief complaint on file.  Femoral fracture  Interval History Status: not changed.     Seen at bedside, hemodynamically stable, currently on nasal cannula  S/p right hip arthroplasty  Awake and alert, cooperative, does not look in distress  Continues to be on Levaquin  Status changed to DNR CCA, no intubation  Working on placement    Brief History:     Per my colleague   \"Eliz Macias is a 88 y.o. Non- / non  female who presents with fall resulted in fracture transferred for surgical intervention   and is admitted to the hospital for the management of Fracture of unspecified part of neck of right femur, initial encounter for closed fracture (HCC).     88-year-old female Tajik speaking with past medical history of diabetes mellitus type 2, CAD heart attack 4 years ago on aspirin and Plavix, hypertension, hyperlipidemia presented to ED at outside hospital after a mechanical fall on 7/3 around 6 PM in the living room.  Since then patient has been non weightbearing on her right hip.  No loss of consciousness denies hitting her head.  On the morning of 7/4 patient tried  to ambulate but she could not and then presented to ED.     On my evaluation  was called, despite multiple attempts patient was not understanding Danish through .  Family was also not at bedside.  She intermittently nods her head to some of the question but unable to get any history from her.  Patient was not in pain.  Patient coughed when I was present there and also patient noted to be slightly increased work of breathing.     While in ED patient was noted to be  coronary artery of native heart without angina pectoris 7/5/2024 Yes    Essential hypertension 7/5/2024 Yes    ARCENIO (generalized anxiety disorder) 7/5/2024 Yes    Gastroesophageal reflux disease without esophagitis 7/5/2024 Yes    Hyperlipidemia 7/5/2024 Yes    Type 2 diabetes mellitus with hyperglycemia (HCC) 7/5/2024 Yes    Preoperative clearance 7/5/2024 Yes    Shortness of breath 7/5/2024 Yes    Rhinovirus infection 7/6/2024 Yes    CAP (community acquired pneumonia) 7/6/2024 Yes    Acute cystitis without hematuria 7/6/2024 Yes    ACP (advance care planning) 7/9/2024 Yes    Encounter for palliative care 7/9/2024 Yes    Parkinson's disease with fluctuating manifestations (HCC) 7/9/2024 Yes    Moderate dementia without behavioral disturbance, psychotic disturbance, mood disturbance, or anxiety (HCC) 7/9/2024 Yes    Hypoactive delirium after surgical procedure 7/9/2024 Yes   Plan:        -s/p hip arthroplasty right side, ortho ok with D/C after PT/OT, plan for send on ASA for DVT prophylaxis  -Continue supplemental oxygen, positive for rhinovirus, started on levaquin for possible pneumonia/UTI, follow-up for lung nodule as outpatient  -Appreciate cardiology service recommendations,  echo reviewed  -Continue home meds as tolerated.  -Continue diabetic diet, sliding scale, POCT checks, started on Lantus 10 nightly  -Monitor urine output, bladder scan as needed  -Cut down pain medications as tolerated.    PT/OT      Celine Martinez Sra, MD  7/10/2024  4:20 PM

## 2024-07-10 NOTE — PLAN OF CARE
Problem: Discharge Planning  Goal: Discharge to home or other facility with appropriate resources  7/10/2024 0837 by Mariela Bui RN  Outcome: Progressing  7/10/2024 0727 by Tangela Schafer RN  Outcome: Progressing  Flowsheets (Taken 7/9/2024 2030)  Discharge to home or other facility with appropriate resources: Identify barriers to discharge with patient and caregiver     Problem: Safety - Adult  Goal: Free from fall injury  7/10/2024 0837 by Mariela Bui RN  Outcome: Progressing  7/10/2024 0727 by Tangela Schafer RN  Outcome: Progressing     Problem: Skin/Tissue Integrity  Goal: Absence of new skin breakdown  Description: 1.  Monitor for areas of redness and/or skin breakdown  2.  Assess vascular access sites hourly  3.  Every 4-6 hours minimum:  Change oxygen saturation probe site  4.  Every 4-6 hours:  If on nasal continuous positive airway pressure, respiratory therapy assess nares and determine need for appliance change or resting period.  7/10/2024 0837 by Mariela Bui RN  Outcome: Progressing  7/10/2024 0727 by Tangela Schafer RN  Outcome: Progressing     Problem: ABCDS Injury Assessment  Goal: Absence of physical injury  7/10/2024 0837 by Mariela Bui RN  Outcome: Progressing  7/10/2024 0727 by Tangela Schafer RN  Outcome: Progressing     Problem: Chronic Conditions and Co-morbidities  Goal: Patient's chronic conditions and co-morbidity symptoms are monitored and maintained or improved  7/10/2024 0837 by Mariela Bui RN  Outcome: Progressing  7/10/2024 0727 by Tangela Schafer RN  Outcome: Progressing  Flowsheets (Taken 7/9/2024 2030)  Care Plan - Patient's Chronic Conditions and Co-Morbidity Symptoms are Monitored and Maintained or Improved: Monitor and assess patient's chronic conditions and comorbid symptoms for stability, deterioration, or improvement     Problem: Pain  Goal: Verbalizes/displays adequate comfort level or baseline comfort level  7/10/2024 0837 by Mariela Bui RN  Outcome:

## 2024-07-11 LAB
ANION GAP SERPL CALCULATED.3IONS-SCNC: 11 MMOL/L (ref 9–16)
BASOPHILS # BLD: 0.07 K/UL (ref 0–0.2)
BASOPHILS NFR BLD: 1 % (ref 0–2)
BUN SERPL-MCNC: 26 MG/DL (ref 8–23)
CALCIUM SERPL-MCNC: 8.4 MG/DL (ref 8.6–10.4)
CHLORIDE SERPL-SCNC: 97 MMOL/L (ref 98–107)
CO2 SERPL-SCNC: 21 MMOL/L (ref 20–31)
CREAT SERPL-MCNC: 0.8 MG/DL (ref 0.5–0.9)
EOSINOPHIL # BLD: 0.07 K/UL (ref 0–0.44)
EOSINOPHILS RELATIVE PERCENT: 1 % (ref 1–4)
ERYTHROCYTE [DISTWIDTH] IN BLOOD BY AUTOMATED COUNT: 16.4 % (ref 11.8–14.4)
GFR, ESTIMATED: 70 ML/MIN/1.73M2
GLUCOSE BLD-MCNC: 249 MG/DL (ref 65–105)
GLUCOSE BLD-MCNC: 282 MG/DL (ref 65–105)
GLUCOSE BLD-MCNC: 307 MG/DL (ref 65–105)
GLUCOSE BLD-MCNC: 317 MG/DL (ref 65–105)
GLUCOSE BLD-MCNC: 342 MG/DL (ref 65–105)
GLUCOSE BLD-MCNC: 363 MG/DL (ref 65–105)
GLUCOSE BLD-MCNC: 376 MG/DL (ref 65–105)
GLUCOSE SERPL-MCNC: 313 MG/DL (ref 74–99)
HCT VFR BLD AUTO: 33.4 % (ref 36.3–47.1)
HGB BLD-MCNC: 10.7 G/DL (ref 11.9–15.1)
IMM GRANULOCYTES # BLD AUTO: 0.22 K/UL (ref 0–0.3)
IMM GRANULOCYTES NFR BLD: 3 %
LYMPHOCYTES NFR BLD: 0.66 K/UL (ref 1.1–3.7)
LYMPHOCYTES RELATIVE PERCENT: 9 % (ref 24–43)
MCH RBC QN AUTO: 27.2 PG (ref 25.2–33.5)
MCHC RBC AUTO-ENTMCNC: 32 G/DL (ref 28.4–34.8)
MCV RBC AUTO: 85 FL (ref 82.6–102.9)
MONOCYTES NFR BLD: 0.58 K/UL (ref 0.1–1.2)
MONOCYTES NFR BLD: 8 % (ref 3–12)
MORPHOLOGY: ABNORMAL
MORPHOLOGY: ABNORMAL
NEUTROPHILS NFR BLD: 78 % (ref 36–65)
NEUTS SEG NFR BLD: 5.7 K/UL (ref 1.5–8.1)
NRBC BLD-RTO: 0 PER 100 WBC
PLATELET # BLD AUTO: 290 K/UL (ref 138–453)
PMV BLD AUTO: 10.6 FL (ref 8.1–13.5)
POTASSIUM SERPL-SCNC: 3.7 MMOL/L (ref 3.7–5.3)
RBC # BLD AUTO: 3.93 M/UL (ref 3.95–5.11)
SODIUM SERPL-SCNC: 129 MMOL/L (ref 136–145)
WBC OTHER # BLD: 7.3 K/UL (ref 3.5–11.3)

## 2024-07-11 PROCEDURE — 6360000002 HC RX W HCPCS: Performed by: NURSE PRACTITIONER

## 2024-07-11 PROCEDURE — 6370000000 HC RX 637 (ALT 250 FOR IP): Performed by: CHIROPRACTOR

## 2024-07-11 PROCEDURE — 80048 BASIC METABOLIC PNL TOTAL CA: CPT

## 2024-07-11 PROCEDURE — 6360000002 HC RX W HCPCS: Performed by: CHIROPRACTOR

## 2024-07-11 PROCEDURE — 2060000000 HC ICU INTERMEDIATE R&B

## 2024-07-11 PROCEDURE — 6370000000 HC RX 637 (ALT 250 FOR IP): Performed by: STUDENT IN AN ORGANIZED HEALTH CARE EDUCATION/TRAINING PROGRAM

## 2024-07-11 PROCEDURE — 36415 COLL VENOUS BLD VENIPUNCTURE: CPT

## 2024-07-11 PROCEDURE — 2580000003 HC RX 258: Performed by: CHIROPRACTOR

## 2024-07-11 PROCEDURE — 82947 ASSAY GLUCOSE BLOOD QUANT: CPT

## 2024-07-11 PROCEDURE — 99232 SBSQ HOSP IP/OBS MODERATE 35: CPT | Performed by: STUDENT IN AN ORGANIZED HEALTH CARE EDUCATION/TRAINING PROGRAM

## 2024-07-11 PROCEDURE — 97530 THERAPEUTIC ACTIVITIES: CPT

## 2024-07-11 PROCEDURE — 85025 COMPLETE CBC W/AUTO DIFF WBC: CPT

## 2024-07-11 RX ORDER — LEVOFLOXACIN 5 MG/ML
250 INJECTION, SOLUTION INTRAVENOUS
Status: DISCONTINUED | OUTPATIENT
Start: 2024-07-12 | End: 2024-07-15

## 2024-07-11 RX ORDER — LEVOFLOXACIN 5 MG/ML
500 INJECTION, SOLUTION INTRAVENOUS
Status: DISCONTINUED | OUTPATIENT
Start: 2024-07-12 | End: 2024-07-15

## 2024-07-11 RX ORDER — INSULIN GLARGINE 100 [IU]/ML
10 INJECTION, SOLUTION SUBCUTANEOUS DAILY
Status: DISCONTINUED | OUTPATIENT
Start: 2024-07-11 | End: 2024-07-17 | Stop reason: HOSPADM

## 2024-07-11 RX ORDER — METOPROLOL TARTRATE 1 MG/ML
5 INJECTION, SOLUTION INTRAVENOUS EVERY 6 HOURS PRN
Status: DISCONTINUED | OUTPATIENT
Start: 2024-07-11 | End: 2024-07-17 | Stop reason: HOSPADM

## 2024-07-11 RX ADMIN — INSULIN LISPRO 6 UNITS: 100 INJECTION, SOLUTION INTRAVENOUS; SUBCUTANEOUS at 08:35

## 2024-07-11 RX ADMIN — INSULIN LISPRO 4 UNITS: 100 INJECTION, SOLUTION INTRAVENOUS; SUBCUTANEOUS at 11:40

## 2024-07-11 RX ADMIN — BUSPIRONE HYDROCHLORIDE 5 MG: 5 TABLET ORAL at 08:36

## 2024-07-11 RX ADMIN — SERTRALINE HYDROCHLORIDE 50 MG: 50 TABLET ORAL at 08:36

## 2024-07-11 RX ADMIN — DESMOPRESSIN ACETATE 40 MG: 0.2 TABLET ORAL at 08:36

## 2024-07-11 RX ADMIN — METOPROLOL TARTRATE 25 MG: 25 TABLET ORAL at 17:05

## 2024-07-11 RX ADMIN — METOPROLOL TARTRATE 25 MG: 25 TABLET ORAL at 21:17

## 2024-07-11 RX ADMIN — OXYCODONE 5 MG: 5 TABLET ORAL at 21:17

## 2024-07-11 RX ADMIN — RIVASTIGMINE TARTRATE 3 MG: 3 CAPSULE ORAL at 08:34

## 2024-07-11 RX ADMIN — Medication 1000 MCG: at 08:34

## 2024-07-11 RX ADMIN — INSULIN LISPRO 8 UNITS: 100 INJECTION, SOLUTION INTRAVENOUS; SUBCUTANEOUS at 21:16

## 2024-07-11 RX ADMIN — CARBIDOPA AND LEVODOPA 1 TABLET: 25; 100 TABLET, EXTENDED RELEASE ORAL at 08:36

## 2024-07-11 RX ADMIN — TAMSULOSIN HYDROCHLORIDE 0.4 MG: 0.4 CAPSULE ORAL at 08:37

## 2024-07-11 RX ADMIN — SODIUM CHLORIDE, PRESERVATIVE FREE 10 ML: 5 INJECTION INTRAVENOUS at 08:36

## 2024-07-11 RX ADMIN — ENOXAPARIN SODIUM 40 MG: 100 INJECTION SUBCUTANEOUS at 08:36

## 2024-07-11 RX ADMIN — ASPIRIN 81 MG 81 MG: 81 TABLET ORAL at 08:35

## 2024-07-11 RX ADMIN — CARBIDOPA AND LEVODOPA 1 TABLET: 25; 100 TABLET, EXTENDED RELEASE ORAL at 21:18

## 2024-07-11 RX ADMIN — SODIUM CHLORIDE, PRESERVATIVE FREE 10 ML: 5 INJECTION INTRAVENOUS at 21:17

## 2024-07-11 RX ADMIN — INSULIN LISPRO 6 UNITS: 100 INJECTION, SOLUTION INTRAVENOUS; SUBCUTANEOUS at 15:55

## 2024-07-11 RX ADMIN — INSULIN GLARGINE 10 UNITS: 100 INJECTION, SOLUTION SUBCUTANEOUS at 08:35

## 2024-07-11 RX ADMIN — LEVOTHYROXINE SODIUM 100 MCG: 100 TABLET ORAL at 06:33

## 2024-07-11 RX ADMIN — EZETIMIBE 10 MG: 10 TABLET ORAL at 08:36

## 2024-07-11 RX ADMIN — ONDANSETRON 4 MG: 2 INJECTION INTRAMUSCULAR; INTRAVENOUS at 10:11

## 2024-07-11 RX ADMIN — INSULIN GLARGINE 10 UNITS: 100 INJECTION, SOLUTION SUBCUTANEOUS at 21:17

## 2024-07-11 RX ADMIN — RIVASTIGMINE TARTRATE 3 MG: 3 CAPSULE ORAL at 21:18

## 2024-07-11 RX ADMIN — CLOPIDOGREL BISULFATE 75 MG: 75 TABLET ORAL at 08:36

## 2024-07-11 RX ADMIN — BUSPIRONE HYDROCHLORIDE 5 MG: 5 TABLET ORAL at 21:18

## 2024-07-11 ASSESSMENT — PAIN SCALES - GENERAL
PAINLEVEL_OUTOF10: 5
PAINLEVEL_OUTOF10: 3

## 2024-07-11 NOTE — PROGRESS NOTES
Occupational Therapy  Facility/Department: [address] STEPDOWN  Occupational Therapy Daily Progress Note    Name: Eliz Macias  : 1935  MRN: [id number]  Date of Service: 2024    Discharge Recommendations:Further therapy recommended at discharge.   Patient would benefit from continued therapy after discharge  OT Equipment Recommendations  Other: CTA once home set up known       Patient Diagnosis(es): The encounter diagnosis was Shortness of breath.  Past Medical History:  has a past medical history of CAD (coronary artery disease), Dementia (HCC), Diabetes mellitus (HCC), Fall, Hyperlipidemia, Hypertension, and Parkinsonian features.  Past Surgical History:  has a past surgical history that includes hip surgery (Right, 2024) and hip surgery (Right, 2024).           Assessment   Pt. Is a high fall risk, needing skilled A x 2 for all transfers, unable to mobilize functional distances at this point or complete ADL activity d/t pain/weakness and decreased cognition.   Performance deficits / Impairments: Decreased functional mobility ;Decreased ADL status;Decreased safe awareness;Decreased cognition;Decreased high-level IADLs;Decreased balance;Decreased endurance  Prognosis: Fair  Decision Making: High Complexity  REQUIRES OT FOLLOW-UP: Yes  Activity Tolerance  Activity Tolerance: Patient limited by pain;Treatment limited secondary to decreased cognition  Activity Tolerance Comments: language barrier        Plan   Occupational Therapy Plan  Times Per Week: 3-5x/week  Current Treatment Recommendations: Functional mobility training, Endurance training, Safety education & training, Patient/Caregiver education & training, Pain management, Self-Care / ADL, Home management training, Balance training, Equipment evaluation, education, & procurement     Restrictions  Restrictions/Precautions  Restrictions/Precautions: Fall Risk, Bed Alarm, Up as Tolerated, Isolation, Weight Bearing, Surgical Protocols, ROM  with PT warranted secondary to decreased patient safety and independence with functional mobility requiring skilled physical assistance of two professionals to simultaneously address individualized discipline goals. OT is addressing sitting/Standing balance, while PT is addressing their individualized functional mobility task.     ADRIAN Jeffery/LUPE

## 2024-07-11 NOTE — PLAN OF CARE
Problem: Discharge Planning  Goal: Discharge to home or other facility with appropriate resources  7/11/2024 0900 by Mariela Bui RN  Outcome: Progressing  7/10/2024 2306 by Phoenix Sparks RN  Outcome: Progressing     Problem: Safety - Adult  Goal: Free from fall injury  7/11/2024 0900 by Mariela Bui RN  Outcome: Progressing  7/10/2024 2306 by Phoenix Sparks RN  Outcome: Progressing     Problem: Skin/Tissue Integrity  Goal: Absence of new skin breakdown  Description: 1.  Monitor for areas of redness and/or skin breakdown  2.  Assess vascular access sites hourly  3.  Every 4-6 hours minimum:  Change oxygen saturation probe site  4.  Every 4-6 hours:  If on nasal continuous positive airway pressure, respiratory therapy assess nares and determine need for appliance change or resting period.  7/11/2024 0900 by Mariela Bui RN  Outcome: Progressing  7/10/2024 2306 by Phoenix Sparks RN  Outcome: Progressing     Problem: ABCDS Injury Assessment  Goal: Absence of physical injury  7/11/2024 0900 by Mariela Bui RN  Outcome: Progressing  7/10/2024 2306 by Phoenix Sparks RN  Outcome: Progressing     Problem: Chronic Conditions and Co-morbidities  Goal: Patient's chronic conditions and co-morbidity symptoms are monitored and maintained or improved  7/11/2024 0900 by Mariela Bui RN  Outcome: Progressing  7/10/2024 2306 by Phoenix Sparks RN  Outcome: Progressing     Problem: Pain  Goal: Verbalizes/displays adequate comfort level or baseline comfort level  7/11/2024 0900 by Mariela Bui RN  Outcome: Progressing  7/10/2024 2306 by Phoenix Sparks RN  Outcome: Progressing

## 2024-07-11 NOTE — CARE COORDINATION
Transitional planning      Primary RN provided choices from son for [facility], [place], [facility] and [facility], referrals sent.      0830 call from [facility], [facility] can accept , need HENS for precert, son updated and agreeable.    1000 HENS completed.

## 2024-07-11 NOTE — PROGRESS NOTES
Pharmacy Note     Renal Dose Adjustment    Eliz Macias is a 88 y.o. female. Pharmacist assessment of renally cleared medications.    Recent Labs     07/11/24  0747   BUN 26*       Recent Labs     07/11/24  0747   CREATININE 0.8       Estimated Creatinine Clearance: 49 mL/min (based on SCr of 0.8 mg/dL).  Estimated CrCl using Ideal Body Weight: 49 mL/min (based on IBW  kg)    Height:   Ht Readings from Last 1 Encounters:   07/05/24 1.651 m (5' 5\")     Weight:  Wt Readings from Last 1 Encounters:   07/08/24 70 kg (154 lb 5.2 oz)       The following medication dose has been adjusted based upon renal function per P&T Guidelines:             LEVOFLOXACIN 750 MG  adjusted to q 48 hours for crcl < 50 per protocol    Thank you  Kat Hanna, PharmD, [facility]  Inpatient Clinical Pharmacist  [phone]

## 2024-07-11 NOTE — CONSULTS
Session ID: [id number]  Language: Latvian   ID: #[id number]   Name: Chalinoanguage: Latvian   ID: #[id number]   Name: Yolanda

## 2024-07-11 NOTE — PROGRESS NOTES
Comprehensive Nutrition Assessment    Type and Reason for Visit:  Initial    Nutrition Recommendations/Plan:   Continue diet/supplements  Encourage PO intake of liquids      Malnutrition Assessment:  Malnutrition Status:  At risk for malnutrition (Comment) (due to decreased po intake and weight loss) (07/11/24 1119)    Context:  Chronic Illness     Findings of the 6 clinical characteristics of malnutrition:  Energy Intake:  Mild decrease in energy intake (Comment) (PO intake 26-75%)  Weight Loss:  Mild weight loss (specify amount and time period) (9% over 6 months)     Body Fat Loss:  Unable to assess     Muscle Mass Loss:  Unable to assess    Fluid Accumulation:  No significant fluid accumulation     Strength:  Not Performed    Nutrition History and Allergies:   NKFA; weight history reviewed: 02/21/23 10:08  Weight - Scale: 77.1 kg (170 lb) (Comment: In wheelchair, currently cannot physically stand to be weighed.) PMH CAD, Dementia, Diabetes mellitus, Fall, Hyperlipidemia, Hypertension, and Parkinson's. Weight down 7 kg within 6 months (-9%)    Nutrition Assessment:    LOS assessment. Patient is an 89 y/o female admitted for R femur fracture s/p surgical intervention. Palliative care is on board. PO intake recorded for 2 meals - 26-50% and 57-75%.    Nutrition Related Findings:    no edema noted last BM unknown, meds/labs reviewed Na 129 Wound Type: Surgical Incision       Current Nutrition Intake & Therapies:    Average Meal Intake: 26-50%, 51-75%  Average Supplements Intake: Unable to assess  ADULT DIET; Regular; 4 carb choices (60 gm/meal); No Pork  ADULT ORAL NUTRITION SUPPLEMENT; Breakfast, Lunch, Dinner; Diabetic Oral Supplement    Anthropometric Measures:  Height: 165 cm (5' 4.96\")  Ideal Body Weight (IBW): 125 lbs (57 kg)    Admission Body Weight: 69.9 kg (154 lb 1.6 oz)  Current Body Weight: 70 kg (154 lb 5.2 oz), 123.5 % IBW. Weight Source: Bed Scale  Current BMI (kg/m2): 25.7  Usual Body Weight: 77  kg (169 lb 12.1 oz)  % Weight Change (Calculated): -9.1  Weight Adjustment For: No Adjustment                      Estimated Daily Nutrient Needs:  Energy Requirements Based On: Kcal/kg  Weight Used for Energy Requirements: Admission  Energy (kcal/day): 8252-2809 kcal (25-30 kcal/kg)  Weight Used for Protein Requirements: Admission  Protein (g/day): 70-84 g (1-1.2 g/kg)  Method Used for Fluid Requirements: 1 ml/kcal  Fluid (ml/day):      Nutrition Diagnosis:   Unintended weight loss related to inadequate protein-energy intake as evidenced by intake 26-50%, intake 51-75%    Nutrition Interventions:   Food and/or Nutrient Delivery: Continue Current Diet, Continue Oral Nutrition Supplement  Nutrition Education/Counseling: No recommendation at this time  Coordination of Nutrition Care: Continue to monitor while inpatient       Goals:     Goals: Meet at least 75% of estimated needs, by next RD assessment       Nutrition Monitoring and Evaluation:   Behavioral-Environmental Outcomes: None Identified  Food/Nutrient Intake Outcomes: Food and Nutrient Intake, Supplement Intake  Physical Signs/Symptoms Outcomes: Biochemical Data, GI Status, Nutrition Focused Physical Findings, Weight    Discharge Planning:    No discharge needs at this time     Rhoda Nicole RD  Contact: [phone]

## 2024-07-11 NOTE — PROGRESS NOTES
Patient had a 2 minute run of SVT. Writer was in another room when it happened and only was able to make it into to patients room Towards the last 20 seconds of the run and then the patient came out of svt and back into Normal sinus rhythm.  sherry ALEGRE Sra was notified of the situation.

## 2024-07-11 NOTE — PROGRESS NOTES
2052 - Blood sugar was 376. Np notified. 10 units of lantus and 8 units of humalog. Writer will recheck in an hour.     2206 - patient blood sugar 269. Np notified. 4 units of humalog ordered.    2330 - blood sugar 249. NP notified. No new orders.

## 2024-07-11 NOTE — PROGRESS NOTES
7/5/2024 Yes    ARCENIO (generalized anxiety disorder) 7/5/2024 Yes    Gastroesophageal reflux disease without esophagitis 7/5/2024 Yes    Hyperlipidemia 7/5/2024 Yes    Type 2 diabetes mellitus with hyperglycemia (HCC) 7/5/2024 Yes    Preoperative clearance 7/5/2024 Yes    Shortness of breath 7/5/2024 Yes    Rhinovirus infection 7/6/2024 Yes    CAP (community acquired pneumonia) 7/6/2024 Yes    Acute cystitis without hematuria 7/6/2024 Yes    ACP (advance care planning) 7/9/2024 Yes    Encounter for palliative care 7/9/2024 Yes    Parkinson's disease with fluctuating manifestations (HCC) 7/9/2024 Yes    Moderate dementia without behavioral disturbance, psychotic disturbance, mood disturbance, or anxiety (HCC) 7/9/2024 Yes    Hypoactive delirium after surgical procedure 7/9/2024 Yes   Plan:        -s/p hip arthroplasty right side, ortho ok with D/C after PT/OT, plan for send on ASA for DVT prophylaxis  -Continue supplemental oxygen, positive for rhinovirus, started on levaquin for possible pneumonia/UTI, follow-up for lung nodule as outpatient  -Appreciate cardiology service recommendations,  echo reviewed  -Continue home meds as tolerated.  -Continue diabetic diet, sliding scale, POCT checks, started on Lantus 10 twice daily  -Monitor urine output, bladder scan as needed  -Cut down pain medications as tolerated.    PT/OT      Celine Martinez Sra, MD  7/11/2024  7:12 PM

## 2024-07-11 NOTE — PROGRESS NOTES
Physical Therapy  Facility/Department: [address] STEPDOWN  Physical Therapy Treatment Note    Name: Eliz Macias  : 1935  MRN: [id number]  Date of Service: 2024    Discharge Recommendations:  Patient would benefit from continued therapy after discharge   PT Equipment Recommendations  Equipment Needed: No  Other: pt currently requires significant physcial assistance for all aspects of mobility      Patient Diagnosis(es): The encounter diagnosis was Shortness of breath.  Past Medical History:  has a past medical history of CAD (coronary artery disease), Dementia (HCC), Diabetes mellitus (HCC), Fall, Hyperlipidemia, Hypertension, and Parkinsonian features.  Past Surgical History:  has a past surgical history that includes hip surgery (Right, 2024) and hip surgery (Right, 2024).    Assessment   Body Structures, Functions, Activity Limitations Requiring Skilled Therapeutic Intervention: Decreased functional mobility ;Decreased strength;Decreased endurance;Decreased balance;Increased pain;Decreased posture;Decreased ROM;Decreased safe awareness;Decreased cognition  Assessment: Pt required maxA x2 for bed mobiltiy, once seated balance was established was able to remain sitting EOB ~25mins with mostly Mark for posterior lean. Pt stood with modA x2 to RW, unable to take steps this date. Pt limited by nausea, decreased cognition and R hip pain. Currently unsafe to return to prior living arrangement, would benefit from continued PT to maximize safety and independence.  Therapy Prognosis: Good  Barriers to Learning: langauge barrier/cognition  Requires PT Follow-Up: Yes  Activity Tolerance  Activity Tolerance: Patient limited by endurance;Patient limited by pain;Treatment limited secondary to medical complications;Treatment limited secondary to decreased cognition  Activity Tolerance Comments: N/V     Plan   Physical Therapy Plan  General Plan:  (5-6x/wk)  Specific Instructions for Next Treatment: Progress

## 2024-07-12 PROBLEM — I48.91 ATRIAL FIBRILLATION WITH RVR (HCC): Status: ACTIVE | Noted: 2024-07-12

## 2024-07-12 LAB
ANION GAP SERPL CALCULATED.3IONS-SCNC: 10 MMOL/L (ref 9–16)
ANTI-XA UNFRAC HEPARIN: 0.46 IU/L
ANTI-XA UNFRAC HEPARIN: 0.6 IU/L
ANTI-XA UNFRAC HEPARIN: <0.1 IU/L
BASOPHILS # BLD: 0.09 K/UL (ref 0–0.2)
BASOPHILS NFR BLD: 1 % (ref 0–2)
BUN SERPL-MCNC: 22 MG/DL (ref 8–23)
CALCIUM SERPL-MCNC: 8.5 MG/DL (ref 8.6–10.4)
CHLORIDE SERPL-SCNC: 101 MMOL/L (ref 98–107)
CO2 SERPL-SCNC: 22 MMOL/L (ref 20–31)
CREAT SERPL-MCNC: 0.7 MG/DL (ref 0.5–0.9)
EOSINOPHIL # BLD: 0.18 K/UL (ref 0–0.44)
EOSINOPHILS RELATIVE PERCENT: 2 % (ref 1–4)
ERYTHROCYTE [DISTWIDTH] IN BLOOD BY AUTOMATED COUNT: 16.4 % (ref 11.8–14.4)
GFR, ESTIMATED: 82 ML/MIN/1.73M2
GLUCOSE BLD-MCNC: 270 MG/DL (ref 65–105)
GLUCOSE BLD-MCNC: 300 MG/DL (ref 65–105)
GLUCOSE BLD-MCNC: 315 MG/DL (ref 65–105)
GLUCOSE BLD-MCNC: 348 MG/DL (ref 65–105)
GLUCOSE SERPL-MCNC: 220 MG/DL (ref 74–99)
HCT VFR BLD AUTO: 34.2 % (ref 36.3–47.1)
HGB BLD-MCNC: 10.7 G/DL (ref 11.9–15.1)
IMM GRANULOCYTES # BLD AUTO: 0.34 K/UL (ref 0–0.3)
IMM GRANULOCYTES NFR BLD: 4 %
INR PPP: 1.1
LYMPHOCYTES NFR BLD: 0.97 K/UL (ref 1.1–3.7)
LYMPHOCYTES RELATIVE PERCENT: 13 % (ref 24–43)
MCH RBC QN AUTO: 26.6 PG (ref 25.2–33.5)
MCHC RBC AUTO-ENTMCNC: 31.3 G/DL (ref 28.4–34.8)
MCV RBC AUTO: 84.9 FL (ref 82.6–102.9)
MONOCYTES NFR BLD: 0.7 K/UL (ref 0.1–1.2)
MONOCYTES NFR BLD: 9 % (ref 3–12)
NEUTROPHILS NFR BLD: 71 % (ref 36–65)
NEUTS SEG NFR BLD: 5.43 K/UL (ref 1.5–8.1)
NRBC BLD-RTO: 0.3 PER 100 WBC
PARTIAL THROMBOPLASTIN TIME: 25.1 SEC (ref 23–36.5)
PLATELET # BLD AUTO: 310 K/UL (ref 138–453)
PMV BLD AUTO: 10.5 FL (ref 8.1–13.5)
POTASSIUM SERPL-SCNC: 3.9 MMOL/L (ref 3.7–5.3)
PROTHROMBIN TIME: 13.6 SEC (ref 11.7–14.9)
RBC # BLD AUTO: 4.03 M/UL (ref 3.95–5.11)
RBC # BLD: ABNORMAL 10*6/UL
SODIUM SERPL-SCNC: 133 MMOL/L (ref 136–145)
WBC OTHER # BLD: 7.7 K/UL (ref 3.5–11.3)

## 2024-07-12 PROCEDURE — 6370000000 HC RX 637 (ALT 250 FOR IP): Performed by: STUDENT IN AN ORGANIZED HEALTH CARE EDUCATION/TRAINING PROGRAM

## 2024-07-12 PROCEDURE — 36415 COLL VENOUS BLD VENIPUNCTURE: CPT

## 2024-07-12 PROCEDURE — 82947 ASSAY GLUCOSE BLOOD QUANT: CPT

## 2024-07-12 PROCEDURE — 6370000000 HC RX 637 (ALT 250 FOR IP): Performed by: CHIROPRACTOR

## 2024-07-12 PROCEDURE — 85025 COMPLETE CBC W/AUTO DIFF WBC: CPT

## 2024-07-12 PROCEDURE — 2580000003 HC RX 258: Performed by: CHIROPRACTOR

## 2024-07-12 PROCEDURE — 85610 PROTHROMBIN TIME: CPT

## 2024-07-12 PROCEDURE — 99222 1ST HOSP IP/OBS MODERATE 55: CPT | Performed by: INTERNAL MEDICINE

## 2024-07-12 PROCEDURE — 6360000002 HC RX W HCPCS: Performed by: STUDENT IN AN ORGANIZED HEALTH CARE EDUCATION/TRAINING PROGRAM

## 2024-07-12 PROCEDURE — 2500000003 HC RX 250 WO HCPCS: Performed by: NURSE PRACTITIONER

## 2024-07-12 PROCEDURE — 93005 ELECTROCARDIOGRAM TRACING: CPT | Performed by: NURSE PRACTITIONER

## 2024-07-12 PROCEDURE — 85520 HEPARIN ASSAY: CPT

## 2024-07-12 PROCEDURE — 2500000003 HC RX 250 WO HCPCS: Performed by: STUDENT IN AN ORGANIZED HEALTH CARE EDUCATION/TRAINING PROGRAM

## 2024-07-12 PROCEDURE — 6360000002 HC RX W HCPCS: Performed by: NURSE PRACTITIONER

## 2024-07-12 PROCEDURE — 99232 SBSQ HOSP IP/OBS MODERATE 35: CPT | Performed by: STUDENT IN AN ORGANIZED HEALTH CARE EDUCATION/TRAINING PROGRAM

## 2024-07-12 PROCEDURE — 93005 ELECTROCARDIOGRAM TRACING: CPT | Performed by: STUDENT IN AN ORGANIZED HEALTH CARE EDUCATION/TRAINING PROGRAM

## 2024-07-12 PROCEDURE — 2580000003 HC RX 258: Performed by: STUDENT IN AN ORGANIZED HEALTH CARE EDUCATION/TRAINING PROGRAM

## 2024-07-12 PROCEDURE — 80048 BASIC METABOLIC PNL TOTAL CA: CPT

## 2024-07-12 PROCEDURE — 85730 THROMBOPLASTIN TIME PARTIAL: CPT

## 2024-07-12 PROCEDURE — 2060000000 HC ICU INTERMEDIATE R&B

## 2024-07-12 RX ORDER — HEPARIN SODIUM 1000 [USP'U]/ML
4000 INJECTION, SOLUTION INTRAVENOUS; SUBCUTANEOUS PRN
Status: DISCONTINUED | OUTPATIENT
Start: 2024-07-12 | End: 2024-07-13

## 2024-07-12 RX ORDER — SODIUM CHLORIDE 0.9 % (FLUSH) 0.9 %
5-40 SYRINGE (ML) INJECTION PRN
Status: DISCONTINUED | OUTPATIENT
Start: 2024-07-12 | End: 2024-07-17 | Stop reason: HOSPADM

## 2024-07-12 RX ORDER — HEPARIN SODIUM 1000 [USP'U]/ML
4000 INJECTION, SOLUTION INTRAVENOUS; SUBCUTANEOUS ONCE
Status: COMPLETED | OUTPATIENT
Start: 2024-07-12 | End: 2024-07-12

## 2024-07-12 RX ORDER — SODIUM CHLORIDE 0.9 % (FLUSH) 0.9 %
5-40 SYRINGE (ML) INJECTION EVERY 12 HOURS SCHEDULED
Status: DISCONTINUED | OUTPATIENT
Start: 2024-07-12 | End: 2024-07-17 | Stop reason: HOSPADM

## 2024-07-12 RX ORDER — METOPROLOL TARTRATE 1 MG/ML
5 INJECTION, SOLUTION INTRAVENOUS ONCE
Status: COMPLETED | OUTPATIENT
Start: 2024-07-12 | End: 2024-07-12

## 2024-07-12 RX ORDER — HEPARIN SODIUM 1000 [USP'U]/ML
2000 INJECTION, SOLUTION INTRAVENOUS; SUBCUTANEOUS PRN
Status: DISCONTINUED | OUTPATIENT
Start: 2024-07-12 | End: 2024-07-13

## 2024-07-12 RX ORDER — SODIUM CHLORIDE 9 MG/ML
INJECTION, SOLUTION INTRAVENOUS PRN
Status: DISCONTINUED | OUTPATIENT
Start: 2024-07-12 | End: 2024-07-17 | Stop reason: HOSPADM

## 2024-07-12 RX ORDER — HEPARIN SODIUM 10000 [USP'U]/100ML
5-30 INJECTION, SOLUTION INTRAVENOUS CONTINUOUS
Status: DISCONTINUED | OUTPATIENT
Start: 2024-07-12 | End: 2024-07-13

## 2024-07-12 RX ADMIN — OXYCODONE 5 MG: 5 TABLET ORAL at 21:00

## 2024-07-12 RX ADMIN — HEPARIN SODIUM 12 UNITS/KG/HR: 10000 INJECTION, SOLUTION INTRAVENOUS at 11:20

## 2024-07-12 RX ADMIN — FUROSEMIDE 20 MG: 20 TABLET ORAL at 08:25

## 2024-07-12 RX ADMIN — OXYCODONE 5 MG: 5 TABLET ORAL at 10:26

## 2024-07-12 RX ADMIN — BUSPIRONE HYDROCHLORIDE 5 MG: 5 TABLET ORAL at 21:00

## 2024-07-12 RX ADMIN — SODIUM CHLORIDE, PRESERVATIVE FREE 10 ML: 5 INJECTION INTRAVENOUS at 21:03

## 2024-07-12 RX ADMIN — METFORMIN HYDROCHLORIDE 500 MG: 500 TABLET ORAL at 16:56

## 2024-07-12 RX ADMIN — INSULIN LISPRO 4 UNITS: 100 INJECTION, SOLUTION INTRAVENOUS; SUBCUTANEOUS at 08:57

## 2024-07-12 RX ADMIN — RIVASTIGMINE TARTRATE 3 MG: 3 CAPSULE ORAL at 08:28

## 2024-07-12 RX ADMIN — ASPIRIN 81 MG 81 MG: 81 TABLET ORAL at 08:26

## 2024-07-12 RX ADMIN — CARBIDOPA AND LEVODOPA 1 TABLET: 25; 100 TABLET, EXTENDED RELEASE ORAL at 21:06

## 2024-07-12 RX ADMIN — INSULIN LISPRO 6 UNITS: 100 INJECTION, SOLUTION INTRAVENOUS; SUBCUTANEOUS at 17:18

## 2024-07-12 RX ADMIN — INSULIN GLARGINE 10 UNITS: 100 INJECTION, SOLUTION SUBCUTANEOUS at 08:28

## 2024-07-12 RX ADMIN — BUSPIRONE HYDROCHLORIDE 5 MG: 5 TABLET ORAL at 08:26

## 2024-07-12 RX ADMIN — METOPROLOL TARTRATE 5 MG: 5 INJECTION INTRAVENOUS at 06:20

## 2024-07-12 RX ADMIN — METFORMIN HYDROCHLORIDE 500 MG: 500 TABLET ORAL at 10:22

## 2024-07-12 RX ADMIN — HEPARIN SODIUM 4000 UNITS: 1000 INJECTION INTRAVENOUS; SUBCUTANEOUS at 10:14

## 2024-07-12 RX ADMIN — TAMSULOSIN HYDROCHLORIDE 0.4 MG: 0.4 CAPSULE ORAL at 08:26

## 2024-07-12 RX ADMIN — RIVASTIGMINE TARTRATE 3 MG: 3 CAPSULE ORAL at 21:06

## 2024-07-12 RX ADMIN — SERTRALINE HYDROCHLORIDE 50 MG: 50 TABLET ORAL at 08:28

## 2024-07-12 RX ADMIN — INSULIN LISPRO 4 UNITS: 100 INJECTION, SOLUTION INTRAVENOUS; SUBCUTANEOUS at 21:06

## 2024-07-12 RX ADMIN — ENOXAPARIN SODIUM 40 MG: 100 INJECTION SUBCUTANEOUS at 08:27

## 2024-07-12 RX ADMIN — METOPROLOL TARTRATE 25 MG: 25 TABLET ORAL at 21:00

## 2024-07-12 RX ADMIN — CLOPIDOGREL BISULFATE 75 MG: 75 TABLET ORAL at 08:25

## 2024-07-12 RX ADMIN — LEVOFLOXACIN 500 MG: 5 INJECTION, SOLUTION INTRAVENOUS at 22:42

## 2024-07-12 RX ADMIN — METOPROLOL TARTRATE 5 MG: 1 INJECTION, SOLUTION INTRAVENOUS at 06:52

## 2024-07-12 RX ADMIN — SODIUM CHLORIDE, PRESERVATIVE FREE 10 ML: 5 INJECTION INTRAVENOUS at 08:29

## 2024-07-12 RX ADMIN — Medication 150 MG: at 12:34

## 2024-07-12 RX ADMIN — DESMOPRESSIN ACETATE 40 MG: 0.2 TABLET ORAL at 08:26

## 2024-07-12 RX ADMIN — LEVOTHYROXINE SODIUM 100 MCG: 100 TABLET ORAL at 08:25

## 2024-07-12 RX ADMIN — INSULIN GLARGINE 10 UNITS: 100 INJECTION, SOLUTION SUBCUTANEOUS at 21:00

## 2024-07-12 RX ADMIN — LEVOFLOXACIN 250 MG: 5 INJECTION, SOLUTION INTRAVENOUS at 22:43

## 2024-07-12 RX ADMIN — HEPARIN SODIUM 12 UNITS/KG/HR: 10000 INJECTION, SOLUTION INTRAVENOUS at 17:17

## 2024-07-12 RX ADMIN — Medication 1 MG/MIN: at 12:46

## 2024-07-12 RX ADMIN — DOCUSATE SODIUM 100 MG: 100 CAPSULE, LIQUID FILLED ORAL at 08:26

## 2024-07-12 RX ADMIN — SODIUM CHLORIDE, PRESERVATIVE FREE 10 ML: 5 INJECTION INTRAVENOUS at 21:00

## 2024-07-12 RX ADMIN — INSULIN LISPRO 6 UNITS: 100 INJECTION, SOLUTION INTRAVENOUS; SUBCUTANEOUS at 12:50

## 2024-07-12 RX ADMIN — EZETIMIBE 10 MG: 10 TABLET ORAL at 10:24

## 2024-07-12 RX ADMIN — CARBIDOPA AND LEVODOPA 1 TABLET: 25; 100 TABLET, EXTENDED RELEASE ORAL at 08:27

## 2024-07-12 RX ADMIN — Medication 1000 MCG: at 08:26

## 2024-07-12 RX ADMIN — METOPROLOL TARTRATE 25 MG: 25 TABLET ORAL at 08:24

## 2024-07-12 ASSESSMENT — PAIN SCALES - GENERAL
PAINLEVEL_OUTOF10: 10
PAINLEVEL_OUTOF10: 0

## 2024-07-12 ASSESSMENT — PAIN DESCRIPTION - DESCRIPTORS: DESCRIPTORS: ACHING;DISCOMFORT

## 2024-07-12 ASSESSMENT — PAIN DESCRIPTION - LOCATION: LOCATION: HIP

## 2024-07-12 NOTE — PROGRESS NOTES
Fortunato Posey MD notified   1:05pm pt. BP is 72/60 . Drip decreased to 0.5mg/min at 1:15pm  Bps sustained at 98/55 and 85/56  1:50pm BP dropped to 77/59 hr 78. MD notified. Amiodarone held per MD

## 2024-07-12 NOTE — PROGRESS NOTES
Pharmacy Note     Renal Dose Adjustment    Eliz Macias is a 88 y.o. female. Pharmacist assessment of renally cleared medications.    Recent Labs     07/11/24  0747 07/12/24  0543   BUN 26* 22       Recent Labs     07/11/24  0747 07/12/24  0543   CREATININE 0.8 0.7       Estimated Creatinine Clearance: 56 mL/min (based on SCr of 0.7 mg/dL).      Height:   Ht Readings from Last 1 Encounters:   07/11/24 1.65 m (5' 4.96\")     Weight:  Wt Readings from Last 1 Encounters:   07/08/24 70 kg (154 lb 5.2 oz)       The following medication dose has been adjusted based upon renal function and IV to PO conversion per P&T Guidelines:             Levofloxacin changed to 750 mg PO q24h for CrCl >50 mL/min.     Carlos Frey, Pharm.D., AVIS, BCCCP  7/12/2024 7:17 AM

## 2024-07-12 NOTE — PLAN OF CARE
Problem: Discharge Planning  Goal: Discharge to home or other facility with appropriate resources  7/11/2024 2150 by Zainab Strauss RN  Outcome: Progressing  7/11/2024 0900 by Mariela Bui RN  Outcome: Progressing     Problem: Safety - Adult  Goal: Free from fall injury  7/11/2024 2150 by Zainab Strauss RN  Outcome: Progressing  7/11/2024 0900 by Mariela Bui RN  Outcome: Progressing     Problem: Skin/Tissue Integrity  Goal: Absence of new skin breakdown  Description: 1.  Monitor for areas of redness and/or skin breakdown  2.  Assess vascular access sites hourly  3.  Every 4-6 hours minimum:  Change oxygen saturation probe site  4.  Every 4-6 hours:  If on nasal continuous positive airway pressure, respiratory therapy assess nares and determine need for appliance change or resting period.  7/11/2024 2150 by Zainab Strauss RN  Outcome: Progressing  7/11/2024 0900 by Mariela Bui RN  Outcome: Progressing     Problem: ABCDS Injury Assessment  Goal: Absence of physical injury  7/11/2024 2150 by Zainab Strauss RN  Outcome: Progressing  7/11/2024 0900 by Mariela Bui RN  Outcome: Progressing     Problem: Chronic Conditions and Co-morbidities  Goal: Patient's chronic conditions and co-morbidity symptoms are monitored and maintained or improved  7/11/2024 2150 by Zainab Strauss RN  Outcome: Progressing  7/11/2024 0900 by Mariela Bui RN  Outcome: Progressing     Problem: Pain  Goal: Verbalizes/displays adequate comfort level or baseline comfort level  7/11/2024 2150 by Zainab Strauss RN  Outcome: Progressing  7/11/2024 0900 by Mariela Bui RN  Outcome: Progressing

## 2024-07-12 NOTE — CARE COORDINATION
Transitional planning    Called Thiago Martin [phone] and spoke to Carline. Pre cert was started, she will call with any updates

## 2024-07-12 NOTE — PROGRESS NOTES
@[facility]@    [facility]   IN-PATIENT SERVICE   [facility]    Progress Note    7/12/2024    4:49 PM    Name:   Eliz Macias  MRN:     [id number]     Acct:      [id number]   Room:   0403/0403-01   Day:  8  Admit Date:  7/4/2024 10:41 PM    PCP:   Lee Sainz MD  Code Status:  DNR-CCA    Subjective:     C/C: No chief complaint on file.  Femoral fracture  Interval History Status: not changed.     Seen at bedside, hemodynamically stable, intermittently tachycardic since yesterday, EKG concerning for atrial fibrillation, started on heparin, cardiology reconsulted, started on amio drip  Continues to be on Levaquin  Started on metformin as well today  Working on placement    Brief History:     Per my colleague   \"Eliz Macias is a 88 y.o. Non- / non  female who presents with fall resulted in fracture transferred for surgical intervention   and is admitted to the hospital for the management of Fracture of unspecified part of neck of right femur, initial encounter for closed fracture (HCC).     88-year-old female Turkmen speaking with past medical history of diabetes mellitus type 2, CAD heart attack 4 years ago on aspirin and Plavix, hypertension, hyperlipidemia presented to ED at outside hospital after a mechanical fall on 7/3 around 6 PM in the living room.  Since then patient has been non weightbearing on her right hip.  No loss of consciousness denies hitting her head.  On the morning of 7/4 patient tried  to ambulate but she could not and then presented to ED.     On my evaluation  was called, despite multiple attempts patient was not understanding Swedish through .  Family was also not at bedside.  She intermittently nods her head to some of the question but unable to get any history from her.  Patient was not in pain.  Patient coughed when I was present there and also patient noted to be slightly increased work of breathing.     While in    ANIONGAP 11 10   LABGLOM 70 82   CALCIUM 8.4* 8.5*       Recent Labs     07/11/24  1120 07/11/24  1515 07/11/24  1945 07/12/24  0819 07/12/24  1159 07/12/24  1622   POCGLU 282* 307* 363* 270* 348* 300*       ABG:  Lab Results   Component Value Date/Time    FIO2 INFORMATION NOT PROVIDED 07/08/2024 12:17 PM     No results found for: \"SPECIAL\"  Lab Results   Component Value Date/Time    CULTURE NO GROWTH 07/06/2024 06:32 PM       Radiology:  XR FEMUR RIGHT (MIN 2 VIEWS)    Result Date: 7/5/2024  1. Redemonstration of mildly displaced subcapital fracture of the neck of the right femur. 2. No additional fracture or focal abnormality in the rest of the right femur. 3. Right knee joint is intact.     CT CHEST PULMONARY EMBOLISM W CONTRAST    Result Date: 7/5/2024  1. No evidence of pulmonary embolism. 2. A 1.4 cm right middle lobe pulmonary nodule has increased in size from 1.1 cm on 02/03/2023.  PET-CT may be considered for further evaluation on a nonemergent basis. 3. There is circumferential wall thickening of the distal esophagus, as can be seen in esophagitis.  There is upstream fluid-filled distension of the esophagus.  Consider EGD to rule out an underlying mass if clinically indicated.     XR HIP 2-3 VW W PELVIS RIGHT    Result Date: 7/4/2024  Acute slightly displaced right femoral neck fracture.     XR CHEST PORTABLE    Result Date: 7/4/2024  Hypoventilation with probable basilar atelectasis or chronic change. No clear cut vascular congestion, consolidation or large pleural effusion.       Physical Examination:        General appearance: Mentation improved today, awake and alert.    Mental Status: Unable to understand language barrier  Lungs:  clear to auscultation bilaterally, normal effort  Heart:  regular rate and rhythm, no murmur  Abdomen:  soft, nontender, nondistended, normal bowel sounds, no masses, hepatomegaly, splenomegaly  Extremities:  no edema, redness, tenderness in the calves  Skin:  no gross

## 2024-07-12 NOTE — PROGRESS NOTES
[facility]   Progress Note                   Date:   7/12/2024  Patient name: Eliz Macias  Date of admission:  7/4/2024 10:41 PM  MRN:   [id number]  YOB: 1935  PCP: Lee Sainz MD    Reason for Admission:     Subjective:       Patient seen and examined at bedside. New afib/alutter with rvr. BP stable. No CP or SOB.     Medications:   Scheduled Meds:   metFORMIN  500 mg Oral BID WC    amiodarone  150 mg IntraVENous Once    insulin glargine  10 Units SubCUTAneous Daily    levofloxacin  500 mg IntraVENous Q48H    And    levofloxacin  250 mg IntraVENous Q48H    insulin glargine  10 Units SubCUTAneous Nightly    tamsulosin  0.4 mg Oral Daily    [Held by provider] levoFLOXacin  750 mg Oral Every Other Day    clopidogrel  75 mg Oral Daily    furosemide  20 mg Oral Once per day on Mon Wed Fri    aspirin  81 mg Oral Daily    insulin lispro  0-8 Units SubCUTAneous 4x Daily AC & HS    atorvastatin  40 mg Oral Daily    busPIRone  5 mg Oral BID    carbidopa-levodopa  1 tablet Oral BID    docusate sodium  100 mg Oral Daily    ezetimibe  10 mg Oral Daily    levothyroxine  100 mcg Oral QAM AC    metoprolol tartrate  25 mg Oral BID    rivastigmine  3 mg Oral BID    sertraline  50 mg Oral Daily    vitamin B-12  1,000 mcg Oral Daily    sodium chloride flush  5-40 mL IntraVENous 2 times per day     Continuous Infusions:   heparin (PORCINE) Infusion 12 Units/kg/hr (07/12/24 1120)    amiodarone      Followed by    amiodarone      dextrose      sodium chloride Stopped (07/09/24 0609)    dextrose       CBC:   Recent Labs     07/11/24  0747 07/12/24  0543   WBC 7.3 7.7   HGB 10.7* 10.7*    310     BMP:    Recent Labs     07/11/24  0747 07/12/24  0543   * 133*   K 3.7 3.9   CL 97* 101   CO2 21 22   BUN 26* 22   CREATININE 0.8 0.7   GLUCOSE 313* 220*     Hepatic: No results for input(s): \"AST\", \"ALT\", \"BILITOT\", \"ALKPHOS\" in the last 72 hours.    Invalid input(s): \"ALB\"  Troponin: No results

## 2024-07-12 NOTE — PROGRESS NOTES
Notified attending the following message:  Patient is in AFIB, HR ranging from 100s-130s. BP is 112/78. she has oral metoprolol due but prn lopressor not due until 12:00pm. night nurse said patient was in afib overnight   Waiting for orders

## 2024-07-12 NOTE — PROGRESS NOTES
Physical Therapy        Physical Therapy Cancel Note      DATE: 2024    NAME: Eliz Macias  MRN: [id number]   : 1935      Patient not seen this date for Physical Therapy due to:    Patient is in afib with RVR. Will hold off on exerting mobility until pulse is controlled.       Electronically signed by Edwige Buenrostro PT on 2024 at 12:10 PM

## 2024-07-13 LAB
ANION GAP SERPL CALCULATED.3IONS-SCNC: 11 MMOL/L (ref 9–16)
ANTI-XA UNFRAC HEPARIN: 0.3 IU/L
ANTI-XA UNFRAC HEPARIN: 0.33 IU/L
BASOPHILS # BLD: 0.1 K/UL (ref 0–0.2)
BASOPHILS NFR BLD: 1 % (ref 0–2)
BUN SERPL-MCNC: 25 MG/DL (ref 8–23)
CALCIUM SERPL-MCNC: 8.4 MG/DL (ref 8.6–10.4)
CHLORIDE SERPL-SCNC: 99 MMOL/L (ref 98–107)
CO2 SERPL-SCNC: 21 MMOL/L (ref 20–31)
CREAT SERPL-MCNC: 0.8 MG/DL (ref 0.5–0.9)
EOSINOPHIL # BLD: 0.1 K/UL (ref 0–0.4)
EOSINOPHILS RELATIVE PERCENT: 1 % (ref 1–4)
ERYTHROCYTE [DISTWIDTH] IN BLOOD BY AUTOMATED COUNT: 16.9 % (ref 11.8–14.4)
GFR, ESTIMATED: 70 ML/MIN/1.73M2
GLUCOSE BLD-MCNC: 230 MG/DL (ref 65–105)
GLUCOSE BLD-MCNC: 247 MG/DL (ref 65–105)
GLUCOSE BLD-MCNC: 258 MG/DL (ref 65–105)
GLUCOSE BLD-MCNC: 267 MG/DL (ref 65–105)
GLUCOSE BLD-MCNC: 277 MG/DL (ref 65–105)
GLUCOSE SERPL-MCNC: 261 MG/DL (ref 74–99)
HCT VFR BLD AUTO: 32.6 % (ref 36.3–47.1)
HGB BLD-MCNC: 10.2 G/DL (ref 11.9–15.1)
IMM GRANULOCYTES # BLD AUTO: 0.19 K/UL (ref 0–0.3)
IMM GRANULOCYTES NFR BLD: 2 %
LYMPHOCYTES NFR BLD: 1.44 K/UL (ref 1–4.8)
LYMPHOCYTES RELATIVE PERCENT: 15 % (ref 24–44)
MCH RBC QN AUTO: 26.6 PG (ref 25.2–33.5)
MCHC RBC AUTO-ENTMCNC: 31.3 G/DL (ref 28.4–34.8)
MCV RBC AUTO: 85.1 FL (ref 82.6–102.9)
MONOCYTES NFR BLD: 0.58 K/UL (ref 0.1–0.8)
MONOCYTES NFR BLD: 6 % (ref 1–7)
MORPHOLOGY: ABNORMAL
NEUTROPHILS NFR BLD: 75 % (ref 36–66)
NEUTS SEG NFR BLD: 7.19 K/UL (ref 1.8–7.7)
NRBC BLD-RTO: 0.5 PER 100 WBC
NUCLEATED RED BLOOD CELLS: 1 PER 100 WBC
PLATELET # BLD AUTO: 302 K/UL (ref 138–453)
PMV BLD AUTO: 10.5 FL (ref 8.1–13.5)
POTASSIUM SERPL-SCNC: 3.9 MMOL/L (ref 3.7–5.3)
RBC # BLD AUTO: 3.83 M/UL (ref 3.95–5.11)
SODIUM SERPL-SCNC: 131 MMOL/L (ref 136–145)
WBC OTHER # BLD: 9.6 K/UL (ref 3.5–11.3)

## 2024-07-13 PROCEDURE — 6370000000 HC RX 637 (ALT 250 FOR IP): Performed by: CHIROPRACTOR

## 2024-07-13 PROCEDURE — 82947 ASSAY GLUCOSE BLOOD QUANT: CPT

## 2024-07-13 PROCEDURE — 85520 HEPARIN ASSAY: CPT

## 2024-07-13 PROCEDURE — 36415 COLL VENOUS BLD VENIPUNCTURE: CPT

## 2024-07-13 PROCEDURE — 2060000000 HC ICU INTERMEDIATE R&B

## 2024-07-13 PROCEDURE — 99232 SBSQ HOSP IP/OBS MODERATE 35: CPT | Performed by: STUDENT IN AN ORGANIZED HEALTH CARE EDUCATION/TRAINING PROGRAM

## 2024-07-13 PROCEDURE — 99233 SBSQ HOSP IP/OBS HIGH 50: CPT | Performed by: NURSE PRACTITIONER

## 2024-07-13 PROCEDURE — 2580000003 HC RX 258: Performed by: STUDENT IN AN ORGANIZED HEALTH CARE EDUCATION/TRAINING PROGRAM

## 2024-07-13 PROCEDURE — 85025 COMPLETE CBC W/AUTO DIFF WBC: CPT

## 2024-07-13 PROCEDURE — 6370000000 HC RX 637 (ALT 250 FOR IP): Performed by: STUDENT IN AN ORGANIZED HEALTH CARE EDUCATION/TRAINING PROGRAM

## 2024-07-13 PROCEDURE — 80048 BASIC METABOLIC PNL TOTAL CA: CPT

## 2024-07-13 RX ADMIN — DOCUSATE SODIUM 100 MG: 100 CAPSULE, LIQUID FILLED ORAL at 10:00

## 2024-07-13 RX ADMIN — METFORMIN HYDROCHLORIDE 500 MG: 500 TABLET ORAL at 09:58

## 2024-07-13 RX ADMIN — INSULIN LISPRO 4 UNITS: 100 INJECTION, SOLUTION INTRAVENOUS; SUBCUTANEOUS at 09:58

## 2024-07-13 RX ADMIN — RIVASTIGMINE TARTRATE 3 MG: 3 CAPSULE ORAL at 09:58

## 2024-07-13 RX ADMIN — RIVASTIGMINE TARTRATE 3 MG: 3 CAPSULE ORAL at 19:47

## 2024-07-13 RX ADMIN — CARBIDOPA AND LEVODOPA 1 TABLET: 25; 100 TABLET, EXTENDED RELEASE ORAL at 19:47

## 2024-07-13 RX ADMIN — LEVOTHYROXINE SODIUM 100 MCG: 100 TABLET ORAL at 06:45

## 2024-07-13 RX ADMIN — APIXABAN 5 MG: 5 TABLET, FILM COATED ORAL at 19:47

## 2024-07-13 RX ADMIN — CLOPIDOGREL BISULFATE 75 MG: 75 TABLET ORAL at 09:58

## 2024-07-13 RX ADMIN — METOPROLOL TARTRATE 25 MG: 25 TABLET ORAL at 09:59

## 2024-07-13 RX ADMIN — Medication 1000 MCG: at 09:59

## 2024-07-13 RX ADMIN — EZETIMIBE 10 MG: 10 TABLET ORAL at 09:59

## 2024-07-13 RX ADMIN — INSULIN LISPRO 2 UNITS: 100 INJECTION, SOLUTION INTRAVENOUS; SUBCUTANEOUS at 17:24

## 2024-07-13 RX ADMIN — METOPROLOL TARTRATE 25 MG: 25 TABLET ORAL at 19:46

## 2024-07-13 RX ADMIN — TAMSULOSIN HYDROCHLORIDE 0.4 MG: 0.4 CAPSULE ORAL at 09:58

## 2024-07-13 RX ADMIN — SODIUM CHLORIDE, PRESERVATIVE FREE 10 ML: 5 INJECTION INTRAVENOUS at 09:59

## 2024-07-13 RX ADMIN — INSULIN GLARGINE 10 UNITS: 100 INJECTION, SOLUTION SUBCUTANEOUS at 19:57

## 2024-07-13 RX ADMIN — SERTRALINE HYDROCHLORIDE 50 MG: 50 TABLET ORAL at 10:01

## 2024-07-13 RX ADMIN — INSULIN GLARGINE 10 UNITS: 100 INJECTION, SOLUTION SUBCUTANEOUS at 09:58

## 2024-07-13 RX ADMIN — CARBIDOPA AND LEVODOPA 1 TABLET: 25; 100 TABLET, EXTENDED RELEASE ORAL at 09:59

## 2024-07-13 RX ADMIN — METFORMIN HYDROCHLORIDE 500 MG: 500 TABLET ORAL at 17:24

## 2024-07-13 RX ADMIN — INSULIN LISPRO 4 UNITS: 100 INJECTION, SOLUTION INTRAVENOUS; SUBCUTANEOUS at 19:57

## 2024-07-13 RX ADMIN — INSULIN LISPRO 4 UNITS: 100 INJECTION, SOLUTION INTRAVENOUS; SUBCUTANEOUS at 12:28

## 2024-07-13 RX ADMIN — BUSPIRONE HYDROCHLORIDE 5 MG: 5 TABLET ORAL at 19:46

## 2024-07-13 RX ADMIN — DESMOPRESSIN ACETATE 40 MG: 0.2 TABLET ORAL at 09:59

## 2024-07-13 RX ADMIN — BUSPIRONE HYDROCHLORIDE 5 MG: 5 TABLET ORAL at 09:59

## 2024-07-13 RX ADMIN — SODIUM CHLORIDE, PRESERVATIVE FREE 10 ML: 5 INJECTION INTRAVENOUS at 19:47

## 2024-07-13 NOTE — PLAN OF CARE
Problem: Discharge Planning  Goal: Discharge to home or other facility with appropriate resources  7/13/2024 0851 by Suzanna Perez RN  Outcome: Progressing  7/12/2024 2254 by Lanny Vargas RN  Outcome: Progressing  Flowsheets (Taken 7/12/2024 2000)  Discharge to home or other facility with appropriate resources:   Identify barriers to discharge with patient and caregiver   Arrange for needed discharge resources and transportation as appropriate   Identify discharge learning needs (meds, wound care, etc)     Problem: Safety - Adult  Goal: Free from fall injury  7/13/2024 0851 by Suzanna Perez RN  Outcome: Progressing  7/12/2024 2254 by Lanny Vargas RN  Outcome: Progressing  Flowsheets (Taken 7/8/2024 2357)  Free From Fall Injury: Instruct family/caregiver on patient safety     Problem: Skin/Tissue Integrity  Goal: Absence of new skin breakdown  Description: 1.  Monitor for areas of redness and/or skin breakdown  2.  Assess vascular access sites hourly  3.  Every 4-6 hours minimum:  Change oxygen saturation probe site  4.  Every 4-6 hours:  If on nasal continuous positive airway pressure, respiratory therapy assess nares and determine need for appliance change or resting period.  7/13/2024 0851 by Suzanna Perez RN  Outcome: Progressing  7/12/2024 2254 by Lanny Vargas RN  Outcome: Progressing     Problem: ABCDS Injury Assessment  Goal: Absence of physical injury  7/13/2024 0851 by Suzanna Perez RN  Outcome: Progressing  7/12/2024 2254 by Lanny Vargas RN  Outcome: Progressing  Flowsheets (Taken 7/8/2024 2357)  Absence of Physical Injury: Implement safety measures based on patient assessment     Problem: Chronic Conditions and Co-morbidities  Goal: Patient's chronic conditions and co-morbidity symptoms are monitored and maintained or improved  7/13/2024 0851 by Suzanna Perez RN  Outcome: Progressing  7/12/2024 2254 by Lanny Vargas RN  Outcome: Progressing  Flowsheets (Taken 7/12/2024 2000)  Care Plan -  Patient's Chronic Conditions and Co-Morbidity Symptoms are Monitored and Maintained or Improved:   Monitor and assess patient's chronic conditions and comorbid symptoms for stability, deterioration, or improvement   Collaborate with multidisciplinary team to address chronic and comorbid conditions and prevent exacerbation or deterioration   Update acute care plan with appropriate goals if chronic or comorbid symptoms are exacerbated and prevent overall improvement and discharge     Problem: Pain  Goal: Verbalizes/displays adequate comfort level or baseline comfort level  7/13/2024 0851 by Suzanna Perez RN  Outcome: Progressing  7/12/2024 2254 by Lanny Vargas RN  Outcome: Progressing  Flowsheets (Taken 7/12/2024 1933)  Verbalizes/displays adequate comfort level or baseline comfort level:   Encourage patient to monitor pain and request assistance   Assess pain using appropriate pain scale   Administer analgesics based on type and severity of pain and evaluate response   Implement non-pharmacological measures as appropriate and evaluate response   Consider cultural and social influences on pain and pain management   Notify Licensed Independent Practitioner if interventions unsuccessful or patient reports new pain

## 2024-07-13 NOTE — PROGRESS NOTES
[facility]   Progress Note                   Date:   7/13/2024  Patient name: Eliz Macias  Date of admission:  7/4/2024 10:41 PM  MRN:   [id number]  YOB: 1935  PCP: Lee Sainz MD    Reason for Admission:     Subjective:       Patient seen and examined at bedside. Converted to SR around 1300 yesterday and remained SR overnight, presently sinus 62. Received Amio bolus yesterday but no drip d/t hypotension. Bp stable this AM.     Medications:   Scheduled Meds:   metFORMIN  500 mg Oral BID WC    sodium chloride flush  5-40 mL IntraVENous 2 times per day    insulin glargine  10 Units SubCUTAneous Daily    levofloxacin  500 mg IntraVENous Q48H    And    levofloxacin  250 mg IntraVENous Q48H    insulin glargine  10 Units SubCUTAneous Nightly    tamsulosin  0.4 mg Oral Daily    [Held by provider] levoFLOXacin  750 mg Oral Every Other Day    clopidogrel  75 mg Oral Daily    furosemide  20 mg Oral Once per day on Mon Wed Fri    insulin lispro  0-8 Units SubCUTAneous 4x Daily AC & HS    atorvastatin  40 mg Oral Daily    busPIRone  5 mg Oral BID    carbidopa-levodopa  1 tablet Oral BID    docusate sodium  100 mg Oral Daily    ezetimibe  10 mg Oral Daily    levothyroxine  100 mcg Oral QAM AC    metoprolol tartrate  25 mg Oral BID    rivastigmine  3 mg Oral BID    sertraline  50 mg Oral Daily    vitamin B-12  1,000 mcg Oral Daily    sodium chloride flush  5-40 mL IntraVENous 2 times per day     Continuous Infusions:   heparin (PORCINE) Infusion 12 Units/kg/hr (07/13/24 0400)    amiodarone Stopped (07/12/24 1720)    sodium chloride      dextrose      sodium chloride Stopped (07/09/24 0609)    dextrose       CBC:   Recent Labs     07/11/24  0747 07/12/24  0543 07/13/24  0326   WBC 7.3 7.7 9.6   HGB 10.7* 10.7* 10.2*    310 302       BMP:    Recent Labs     07/11/24  0747 07/12/24  0543 07/13/24  0326   * 133* 131*   K 3.7 3.9 3.9   CL 97* 101 99   CO2 21 22 21   BUN 26* 22 25*

## 2024-07-13 NOTE — PROGRESS NOTES
[facility]    [facility]   IN-PATIENT SERVICE   [facility]    Progress Note    7/13/2024    3:07 PM    Name:   Eliz Macias  MRN:     [id number]     Acct:      [id number]   Room:   0403/0403-01   Day:  9  Admit Date:  7/4/2024 10:41 PM    PCP:   Lee Sainz MD  Code Status:  DNR-CCA    Subjective:     C/C: No chief complaint on file.  Femoral fracture  Interval History Status: not changed.     Seen at bedside, hemodynamically stable, tachycardia improved, off amiodarone, continues to be on heparin  Denies any complaints today, looks comfortable, family at bedside updated  Continues to be on Levaquin  Working on placement    Brief History:     Per my colleague   \"Eliz Macias is a 88 y.o. Non- / non  female who presents with fall resulted in fracture transferred for surgical intervention   and is admitted to the hospital for the management of Fracture of unspecified part of neck of right femur, initial encounter for closed fracture (HCC).     88-year-old female Turkmen speaking with past medical history of diabetes mellitus type 2, CAD heart attack 4 years ago on aspirin and Plavix, hypertension, hyperlipidemia presented to ED at outside hospital after a mechanical fall on 7/3 around 6 PM in the living room.  Since then patient has been non weightbearing on her right hip.  No loss of consciousness denies hitting her head.  On the morning of 7/4 patient tried  to ambulate but she could not and then presented to ED.     On my evaluation  was called, despite multiple attempts patient was not understanding Maltese through .  Family was also not at bedside.  She intermittently nods her head to some of the question but unable to get any history from her.  Patient was not in pain.  Patient coughed when I was present there and also patient noted to be slightly increased work of breathing.     While in ED patient was noted to be hypoxic 86% on  room air placed on supplemental oxygen.  EKG done in ED showed sinus rhythm, Q waves in inferior lead and abnormal R wave progression in anterior leads.  No ST segment changes to suggest of ischemia.  X-ray hip showed acute slightly displaced right femoral neck fracture.  Chest x-ray showed hypoventilation with probable basilar atelectasis/chronic changes.  No vascular congestion or consolidation.     Significant lab workup troponin 21, , no leukocytosis, hemoglobin of 13.4, sodium 133, creatinine of 1.1 with baseline creatinine of 0.9, blood glucose of 278.\"    Medications:     Allergies:  No Known Allergies    Current Meds:   Scheduled Meds:    metFORMIN  500 mg Oral BID WC    sodium chloride flush  5-40 mL IntraVENous 2 times per day    insulin glargine  10 Units SubCUTAneous Daily    levofloxacin  500 mg IntraVENous Q48H    And    levofloxacin  250 mg IntraVENous Q48H    insulin glargine  10 Units SubCUTAneous Nightly    tamsulosin  0.4 mg Oral Daily    [Held by provider] levoFLOXacin  750 mg Oral Every Other Day    clopidogrel  75 mg Oral Daily    furosemide  20 mg Oral Once per day on Mon Wed Fri    insulin lispro  0-8 Units SubCUTAneous 4x Daily AC & HS    atorvastatin  40 mg Oral Daily    busPIRone  5 mg Oral BID    carbidopa-levodopa  1 tablet Oral BID    docusate sodium  100 mg Oral Daily    ezetimibe  10 mg Oral Daily    levothyroxine  100 mcg Oral QAM AC    metoprolol tartrate  25 mg Oral BID    rivastigmine  3 mg Oral BID    sertraline  50 mg Oral Daily    vitamin B-12  1,000 mcg Oral Daily    sodium chloride flush  5-40 mL IntraVENous 2 times per day     Continuous Infusions:    heparin (PORCINE) Infusion 12 Units/kg/hr (07/13/24 0400)    sodium chloride      dextrose      sodium chloride Stopped (07/09/24 0609)    dextrose       PRN Meds: heparin (porcine), heparin (porcine), sodium chloride flush, sodium chloride, metoprolol, oxyCODONE, naloxone, glucose, dextrose bolus **OR** dextrose bolus,

## 2024-07-13 NOTE — PLAN OF CARE
Patient a&ox2, self and place. Albanian (Central African) speaking,  used as needed. Patient follow demands, can turn self however assistance with repositioning every two hours or as needed. Patient has Heparin drip infusing, anti-xa monitored per protocol. Purewick in place for skin protection. Family at bedside and involved in care. Plan is to be discharged to rehab facility.     Problem: Discharge Planning  Goal: Discharge to home or other facility with appropriate resources  7/12/2024 2254 by Lanny Vargas RN  Outcome: Progressing  Flowsheets (Taken 7/12/2024 2000)  Discharge to home or other facility with appropriate resources:   Identify barriers to discharge with patient and caregiver   Arrange for needed discharge resources and transportation as appropriate   Identify discharge learning needs (meds, wound care, etc)  7/12/2024 1438 by Rachel Alford RN  Outcome: Progressing     Problem: Safety - Adult  Goal: Free from fall injury  7/12/2024 2254 by Lanny Vargas RN  Outcome: Progressing  Flowsheets (Taken 7/8/2024 2357)  Free From Fall Injury: Instruct family/caregiver on patient safety  7/12/2024 1438 by Rachel Alford RN  Outcome: Progressing     Problem: Skin/Tissue Integrity  Goal: Absence of new skin breakdown  Description: 1.  Monitor for areas of redness and/or skin breakdown  2.  Assess vascular access sites hourly  3.  Every 4-6 hours minimum:  Change oxygen saturation probe site  4.  Every 4-6 hours:  If on nasal continuous positive airway pressure, respiratory therapy assess nares and determine need for appliance change or resting period.  7/12/2024 2254 by Lanny Vargas RN  Outcome: Progressing  7/12/2024 1438 by Rachel Alford RN  Outcome: Progressing     Problem: ABCDS Injury Assessment  Goal: Absence of physical injury  7/12/2024 2254 by Lanny Vargas RN  Outcome: Progressing  Flowsheets (Taken 7/8/2024 2357)  Absence of Physical Injury: Implement safety measures based on patient

## 2024-07-14 LAB
GLUCOSE BLD-MCNC: 212 MG/DL (ref 65–105)
GLUCOSE BLD-MCNC: 281 MG/DL (ref 65–105)
GLUCOSE BLD-MCNC: 313 MG/DL (ref 65–105)

## 2024-07-14 PROCEDURE — 82947 ASSAY GLUCOSE BLOOD QUANT: CPT

## 2024-07-14 PROCEDURE — 2580000003 HC RX 258: Performed by: STUDENT IN AN ORGANIZED HEALTH CARE EDUCATION/TRAINING PROGRAM

## 2024-07-14 PROCEDURE — 6370000000 HC RX 637 (ALT 250 FOR IP): Performed by: CHIROPRACTOR

## 2024-07-14 PROCEDURE — 6370000000 HC RX 637 (ALT 250 FOR IP): Performed by: STUDENT IN AN ORGANIZED HEALTH CARE EDUCATION/TRAINING PROGRAM

## 2024-07-14 PROCEDURE — 6360000002 HC RX W HCPCS: Performed by: STUDENT IN AN ORGANIZED HEALTH CARE EDUCATION/TRAINING PROGRAM

## 2024-07-14 PROCEDURE — 99232 SBSQ HOSP IP/OBS MODERATE 35: CPT | Performed by: STUDENT IN AN ORGANIZED HEALTH CARE EDUCATION/TRAINING PROGRAM

## 2024-07-14 PROCEDURE — 2060000000 HC ICU INTERMEDIATE R&B

## 2024-07-14 RX ADMIN — INSULIN LISPRO 6 UNITS: 100 INJECTION, SOLUTION INTRAVENOUS; SUBCUTANEOUS at 12:33

## 2024-07-14 RX ADMIN — Medication 1000 MCG: at 08:44

## 2024-07-14 RX ADMIN — INSULIN LISPRO 4 UNITS: 100 INJECTION, SOLUTION INTRAVENOUS; SUBCUTANEOUS at 16:22

## 2024-07-14 RX ADMIN — DESMOPRESSIN ACETATE 40 MG: 0.2 TABLET ORAL at 08:44

## 2024-07-14 RX ADMIN — INSULIN GLARGINE 10 UNITS: 100 INJECTION, SOLUTION SUBCUTANEOUS at 08:44

## 2024-07-14 RX ADMIN — METFORMIN HYDROCHLORIDE 500 MG: 500 TABLET ORAL at 16:23

## 2024-07-14 RX ADMIN — BUSPIRONE HYDROCHLORIDE 5 MG: 5 TABLET ORAL at 08:44

## 2024-07-14 RX ADMIN — EZETIMIBE 10 MG: 10 TABLET ORAL at 08:44

## 2024-07-14 RX ADMIN — RIVASTIGMINE TARTRATE 3 MG: 3 CAPSULE ORAL at 08:44

## 2024-07-14 RX ADMIN — CARBIDOPA AND LEVODOPA 1 TABLET: 25; 100 TABLET, EXTENDED RELEASE ORAL at 08:44

## 2024-07-14 RX ADMIN — APIXABAN 5 MG: 5 TABLET, FILM COATED ORAL at 08:44

## 2024-07-14 RX ADMIN — RIVASTIGMINE TARTRATE 3 MG: 3 CAPSULE ORAL at 20:57

## 2024-07-14 RX ADMIN — CLOPIDOGREL BISULFATE 75 MG: 75 TABLET ORAL at 08:44

## 2024-07-14 RX ADMIN — LEVOFLOXACIN 250 MG: 5 INJECTION, SOLUTION INTRAVENOUS at 23:32

## 2024-07-14 RX ADMIN — BUSPIRONE HYDROCHLORIDE 5 MG: 5 TABLET ORAL at 20:57

## 2024-07-14 RX ADMIN — INSULIN GLARGINE 10 UNITS: 100 INJECTION, SOLUTION SUBCUTANEOUS at 21:09

## 2024-07-14 RX ADMIN — TAMSULOSIN HYDROCHLORIDE 0.4 MG: 0.4 CAPSULE ORAL at 08:44

## 2024-07-14 RX ADMIN — INSULIN LISPRO 2 UNITS: 100 INJECTION, SOLUTION INTRAVENOUS; SUBCUTANEOUS at 06:39

## 2024-07-14 RX ADMIN — METOPROLOL TARTRATE 25 MG: 25 TABLET ORAL at 20:57

## 2024-07-14 RX ADMIN — CARBIDOPA AND LEVODOPA 1 TABLET: 25; 100 TABLET, EXTENDED RELEASE ORAL at 20:57

## 2024-07-14 RX ADMIN — METOPROLOL TARTRATE 25 MG: 25 TABLET ORAL at 08:43

## 2024-07-14 RX ADMIN — SODIUM CHLORIDE, PRESERVATIVE FREE 10 ML: 5 INJECTION INTRAVENOUS at 20:58

## 2024-07-14 RX ADMIN — SERTRALINE HYDROCHLORIDE 50 MG: 50 TABLET ORAL at 08:44

## 2024-07-14 RX ADMIN — LEVOTHYROXINE SODIUM 100 MCG: 100 TABLET ORAL at 08:07

## 2024-07-14 RX ADMIN — APIXABAN 5 MG: 5 TABLET, FILM COATED ORAL at 20:58

## 2024-07-14 RX ADMIN — LEVOFLOXACIN 500 MG: 5 INJECTION, SOLUTION INTRAVENOUS at 22:21

## 2024-07-14 RX ADMIN — METFORMIN HYDROCHLORIDE 500 MG: 500 TABLET ORAL at 08:44

## 2024-07-14 RX ADMIN — DOCUSATE SODIUM 100 MG: 100 CAPSULE, LIQUID FILLED ORAL at 08:44

## 2024-07-14 RX ADMIN — INSULIN LISPRO 6 UNITS: 100 INJECTION, SOLUTION INTRAVENOUS; SUBCUTANEOUS at 21:12

## 2024-07-14 NOTE — PLAN OF CARE
Problem: Discharge Planning  Goal: Discharge to home or other facility with appropriate resources  7/14/2024 1448 by Suzanna Perez RN  Outcome: Progressing  7/14/2024 1448 by Suzanna Perez RN  Outcome: Progressing  7/14/2024 0129 by Claudette Adan RN  Outcome: Progressing     Problem: Safety - Adult  Goal: Free from fall injury  7/14/2024 1448 by Suzanna Perez RN  Outcome: Progressing  7/14/2024 1448 by Suzanna Perez RN  Outcome: Progressing  7/14/2024 0129 by Claudette Adan RN  Outcome: Progressing     Problem: Skin/Tissue Integrity  Goal: Absence of new skin breakdown  Description: 1.  Monitor for areas of redness and/or skin breakdown  2.  Assess vascular access sites hourly  3.  Every 4-6 hours minimum:  Change oxygen saturation probe site  4.  Every 4-6 hours:  If on nasal continuous positive airway pressure, respiratory therapy assess nares and determine need for appliance change or resting period.  7/14/2024 1448 by Suzanna Perez RN  Outcome: Progressing  7/14/2024 1448 by Suzanna Perez RN  Outcome: Progressing  7/14/2024 0129 by Claudette Adan RN  Outcome: Progressing     Problem: ABCDS Injury Assessment  Goal: Absence of physical injury  7/14/2024 1448 by Suzanna Perez RN  Outcome: Progressing  7/14/2024 1448 by Suzanna Perez RN  Outcome: Progressing  7/14/2024 0129 by Claudette Adan RN  Outcome: Progressing     Problem: Chronic Conditions and Co-morbidities  Goal: Patient's chronic conditions and co-morbidity symptoms are monitored and maintained or improved  7/14/2024 1448 by Suzanna Perez RN  Outcome: Progressing  7/14/2024 1448 by Suzanna Perez RN  Outcome: Progressing  7/14/2024 0129 by Claudette Adan RN  Outcome: Progressing     Problem: Pain  Goal: Verbalizes/displays adequate comfort level or baseline comfort level  7/14/2024 1448 by Suzanna Perez RN  Outcome: Progressing  7/14/2024 1448 by Suzanna Perez RN  Outcome: Progressing  7/14/2024 0129 by Claudette Adan RN  Outcome: Progressing

## 2024-07-14 NOTE — PLAN OF CARE
Problem: Discharge Planning  Goal: Discharge to home or other facility with appropriate resources  7/14/2024 1448 by Suzanna Perez RN  Outcome: Progressing  7/14/2024 0129 by Claudette Adan RN  Outcome: Progressing     Problem: Safety - Adult  Goal: Free from fall injury  7/14/2024 1448 by Suzanna Perez RN  Outcome: Progressing  7/14/2024 0129 by Claudette Adan RN  Outcome: Progressing     Problem: Skin/Tissue Integrity  Goal: Absence of new skin breakdown  Description: 1.  Monitor for areas of redness and/or skin breakdown  2.  Assess vascular access sites hourly  3.  Every 4-6 hours minimum:  Change oxygen saturation probe site  4.  Every 4-6 hours:  If on nasal continuous positive airway pressure, respiratory therapy assess nares and determine need for appliance change or resting period.  7/14/2024 1448 by Suzanna Perez RN  Outcome: Progressing  7/14/2024 0129 by Claudette Adan RN  Outcome: Progressing     Problem: ABCDS Injury Assessment  Goal: Absence of physical injury  7/14/2024 1448 by Suzanna Perez RN  Outcome: Progressing  7/14/2024 0129 by Claudette Adan RN  Outcome: Progressing     Problem: Chronic Conditions and Co-morbidities  Goal: Patient's chronic conditions and co-morbidity symptoms are monitored and maintained or improved  7/14/2024 1448 by Suzanna Perez RN  Outcome: Progressing  7/14/2024 0129 by Claudette Adan RN  Outcome: Progressing     Problem: Pain  Goal: Verbalizes/displays adequate comfort level or baseline comfort level  7/14/2024 1448 by Suzanna Perez RN  Outcome: Progressing  7/14/2024 0129 by Claudette Adan RN  Outcome: Progressing

## 2024-07-14 NOTE — PROGRESS NOTES
@[facility]@    [facility]   IN-PATIENT SERVICE   [facility]    Progress Note    7/14/2024    2:49 PM    Name:   Eliz Macias  MRN:     [id number]     Acct:      [id number]   Room:   0403/0403-01   Day:  10  Admit Date:  7/4/2024 10:41 PM    PCP:   Lee Sainz MD  Code Status:  DNR-CCA    Subjective:     C/C: No chief complaint on file.  Femoral fracture  Interval History Status: not changed.     Seen at bedside, hemodynamically stable, heart rate improved  Denies any complaints today, looks comfortable, family at bedside updated  Continues to be on Levaquin  Working on placement    Brief History:     Per my colleague   \"Eliz Macias is a 88 y.o. Non- / non  female who presents with fall resulted in fracture transferred for surgical intervention   and is admitted to the hospital for the management of Fracture of unspecified part of neck of right femur, initial encounter for closed fracture (HCC).     88-year-old female Wolof speaking with past medical history of diabetes mellitus type 2, CAD heart attack 4 years ago on aspirin and Plavix, hypertension, hyperlipidemia presented to ED at outside hospital after a mechanical fall on 7/3 around 6 PM in the living room.  Since then patient has been non weightbearing on her right hip.  No loss of consciousness denies hitting her head.  On the morning of 7/4 patient tried  to ambulate but she could not and then presented to ED.     On my evaluation  was called, despite multiple attempts patient was not understanding Bulgarian through .  Family was also not at bedside.  She intermittently nods her head to some of the question but unable to get any history from her.  Patient was not in pain.  Patient coughed when I was present there and also patient noted to be slightly increased work of breathing.     While in ED patient was noted to be hypoxic 86% on room air placed on supplemental oxygen.  EKG

## 2024-07-15 LAB
EKG ATRIAL RATE: 108 BPM
EKG ATRIAL RATE: 300 BPM
EKG ATRIAL RATE: 80 BPM
EKG P AXIS: 47 DEGREES
EKG P-R INTERVAL: 158 MS
EKG Q-T INTERVAL: 344 MS
EKG Q-T INTERVAL: 366 MS
EKG Q-T INTERVAL: 402 MS
EKG QRS DURATION: 82 MS
EKG QRS DURATION: 86 MS
EKG QRS DURATION: 92 MS
EKG QTC CALCULATION (BAZETT): 422 MS
EKG QTC CALCULATION (BAZETT): 475 MS
EKG QTC CALCULATION (BAZETT): 536 MS
EKG R AXIS: 80 DEGREES
EKG R AXIS: 88 DEGREES
EKG R AXIS: 94 DEGREES
EKG T AXIS: -12 DEGREES
EKG T AXIS: -27 DEGREES
EKG T AXIS: -41 DEGREES
EKG VENTRICULAR RATE: 107 BPM
EKG VENTRICULAR RATE: 115 BPM
EKG VENTRICULAR RATE: 80 BPM
GLUCOSE BLD-MCNC: 320 MG/DL (ref 65–105)
GLUCOSE BLD-MCNC: 330 MG/DL (ref 65–105)
GLUCOSE BLD-MCNC: 349 MG/DL (ref 65–105)
GLUCOSE BLD-MCNC: 356 MG/DL (ref 65–105)

## 2024-07-15 PROCEDURE — 6370000000 HC RX 637 (ALT 250 FOR IP): Performed by: CHIROPRACTOR

## 2024-07-15 PROCEDURE — 2500000003 HC RX 250 WO HCPCS: Performed by: STUDENT IN AN ORGANIZED HEALTH CARE EDUCATION/TRAINING PROGRAM

## 2024-07-15 PROCEDURE — 2580000003 HC RX 258: Performed by: STUDENT IN AN ORGANIZED HEALTH CARE EDUCATION/TRAINING PROGRAM

## 2024-07-15 PROCEDURE — 2060000000 HC ICU INTERMEDIATE R&B

## 2024-07-15 PROCEDURE — 6370000000 HC RX 637 (ALT 250 FOR IP): Performed by: STUDENT IN AN ORGANIZED HEALTH CARE EDUCATION/TRAINING PROGRAM

## 2024-07-15 PROCEDURE — 99232 SBSQ HOSP IP/OBS MODERATE 35: CPT | Performed by: STUDENT IN AN ORGANIZED HEALTH CARE EDUCATION/TRAINING PROGRAM

## 2024-07-15 PROCEDURE — 2580000003 HC RX 258: Performed by: CHIROPRACTOR

## 2024-07-15 PROCEDURE — 82947 ASSAY GLUCOSE BLOOD QUANT: CPT

## 2024-07-15 RX ORDER — GLIPIZIDE 10 MG/1
10 TABLET ORAL
Status: DISCONTINUED | OUTPATIENT
Start: 2024-07-16 | End: 2024-07-17 | Stop reason: HOSPADM

## 2024-07-15 RX ORDER — GUAIFENESIN AND DEXTROMETHORPHAN HYDROBROMIDE 100; 10 MG/5ML; MG/5ML
5 SOLUTION ORAL EVERY 4 HOURS PRN
Status: DISCONTINUED | OUTPATIENT
Start: 2024-07-15 | End: 2024-07-15

## 2024-07-15 RX ORDER — GUAIFENESIN/DEXTROMETHORPHAN 100-10MG/5
5 SYRUP ORAL EVERY 4 HOURS PRN
Status: DISCONTINUED | OUTPATIENT
Start: 2024-07-15 | End: 2024-07-17 | Stop reason: HOSPADM

## 2024-07-15 RX ADMIN — OXYCODONE 5 MG: 5 TABLET ORAL at 18:51

## 2024-07-15 RX ADMIN — APIXABAN 5 MG: 5 TABLET, FILM COATED ORAL at 09:49

## 2024-07-15 RX ADMIN — METOPROLOL TARTRATE 25 MG: 25 TABLET ORAL at 09:49

## 2024-07-15 RX ADMIN — INSULIN LISPRO 6 UNITS: 100 INJECTION, SOLUTION INTRAVENOUS; SUBCUTANEOUS at 15:20

## 2024-07-15 RX ADMIN — DOCUSATE SODIUM 100 MG: 100 CAPSULE, LIQUID FILLED ORAL at 09:45

## 2024-07-15 RX ADMIN — SODIUM CHLORIDE, PRESERVATIVE FREE 10 ML: 5 INJECTION INTRAVENOUS at 09:10

## 2024-07-15 RX ADMIN — CARBIDOPA AND LEVODOPA 1 TABLET: 25; 100 TABLET, EXTENDED RELEASE ORAL at 09:30

## 2024-07-15 RX ADMIN — APIXABAN 5 MG: 5 TABLET, FILM COATED ORAL at 19:29

## 2024-07-15 RX ADMIN — OXYCODONE 5 MG: 5 TABLET ORAL at 11:54

## 2024-07-15 RX ADMIN — GUAIFENESIN AND DEXTROMETHORPHAN 5 ML: 100; 10 SYRUP ORAL at 15:09

## 2024-07-15 RX ADMIN — SERTRALINE HYDROCHLORIDE 50 MG: 50 TABLET ORAL at 09:44

## 2024-07-15 RX ADMIN — BUSPIRONE HYDROCHLORIDE 5 MG: 5 TABLET ORAL at 19:29

## 2024-07-15 RX ADMIN — METOPROLOL TARTRATE 25 MG: 25 TABLET ORAL at 19:29

## 2024-07-15 RX ADMIN — INSULIN LISPRO 6 UNITS: 100 INJECTION, SOLUTION INTRAVENOUS; SUBCUTANEOUS at 19:02

## 2024-07-15 RX ADMIN — BUSPIRONE HYDROCHLORIDE 5 MG: 5 TABLET ORAL at 09:30

## 2024-07-15 RX ADMIN — Medication 1000 MCG: at 09:45

## 2024-07-15 RX ADMIN — ANTI-FUNGAL POWDER MICONAZOLE NITRATE TALC FREE: 1.42 POWDER TOPICAL at 19:32

## 2024-07-15 RX ADMIN — EZETIMIBE 10 MG: 10 TABLET ORAL at 09:30

## 2024-07-15 RX ADMIN — INSULIN LISPRO 6 UNITS: 100 INJECTION, SOLUTION INTRAVENOUS; SUBCUTANEOUS at 19:28

## 2024-07-15 RX ADMIN — ANTI-FUNGAL POWDER MICONAZOLE NITRATE TALC FREE: 1.42 POWDER TOPICAL at 14:00

## 2024-07-15 RX ADMIN — RIVASTIGMINE TARTRATE 3 MG: 3 CAPSULE ORAL at 09:45

## 2024-07-15 RX ADMIN — ACETAMINOPHEN 650 MG: 325 TABLET ORAL at 20:29

## 2024-07-15 RX ADMIN — TAMSULOSIN HYDROCHLORIDE 0.4 MG: 0.4 CAPSULE ORAL at 09:44

## 2024-07-15 RX ADMIN — CLOPIDOGREL BISULFATE 75 MG: 75 TABLET ORAL at 09:44

## 2024-07-15 RX ADMIN — SODIUM CHLORIDE, PRESERVATIVE FREE 10 ML: 5 INJECTION INTRAVENOUS at 19:29

## 2024-07-15 RX ADMIN — INSULIN GLARGINE 10 UNITS: 100 INJECTION, SOLUTION SUBCUTANEOUS at 09:50

## 2024-07-15 RX ADMIN — METFORMIN HYDROCHLORIDE 500 MG: 500 TABLET ORAL at 18:51

## 2024-07-15 RX ADMIN — SODIUM CHLORIDE, PRESERVATIVE FREE 10 ML: 5 INJECTION INTRAVENOUS at 09:00

## 2024-07-15 RX ADMIN — METFORMIN HYDROCHLORIDE 500 MG: 500 TABLET ORAL at 09:45

## 2024-07-15 RX ADMIN — RIVASTIGMINE TARTRATE 3 MG: 3 CAPSULE ORAL at 19:29

## 2024-07-15 RX ADMIN — INSULIN GLARGINE 10 UNITS: 100 INJECTION, SOLUTION SUBCUTANEOUS at 19:28

## 2024-07-15 RX ADMIN — CARBIDOPA AND LEVODOPA 1 TABLET: 25; 100 TABLET, EXTENDED RELEASE ORAL at 19:29

## 2024-07-15 RX ADMIN — ACETAMINOPHEN 650 MG: 325 TABLET ORAL at 05:40

## 2024-07-15 RX ADMIN — DESMOPRESSIN ACETATE 40 MG: 0.2 TABLET ORAL at 09:44

## 2024-07-15 RX ADMIN — LEVOTHYROXINE SODIUM 100 MCG: 100 TABLET ORAL at 05:40

## 2024-07-15 RX ADMIN — FUROSEMIDE 20 MG: 20 TABLET ORAL at 09:30

## 2024-07-15 ASSESSMENT — PAIN SCALES - GENERAL
PAINLEVEL_OUTOF10: 9
PAINLEVEL_OUTOF10: 4
PAINLEVEL_OUTOF10: 8
PAINLEVEL_OUTOF10: 4
PAINLEVEL_OUTOF10: 8
PAINLEVEL_OUTOF10: 6
PAINLEVEL_OUTOF10: 0
PAINLEVEL_OUTOF10: 7
PAINLEVEL_OUTOF10: 3

## 2024-07-15 ASSESSMENT — PAIN DESCRIPTION - DESCRIPTORS
DESCRIPTORS: ACHING
DESCRIPTORS: BURNING;HYPERSENSITIVITY
DESCRIPTORS: TENDER;THROBBING

## 2024-07-15 ASSESSMENT — PAIN DESCRIPTION - LOCATION
LOCATION: GENERALIZED
LOCATION: GENERALIZED;HIP
LOCATION: GENERALIZED
LOCATION: HIP;VAGINA

## 2024-07-15 ASSESSMENT — PAIN - FUNCTIONAL ASSESSMENT
PAIN_FUNCTIONAL_ASSESSMENT: PREVENTS OR INTERFERES SOME ACTIVE ACTIVITIES AND ADLS
PAIN_FUNCTIONAL_ASSESSMENT: PREVENTS OR INTERFERES WITH ALL ACTIVE AND SOME PASSIVE ACTIVITIES

## 2024-07-15 ASSESSMENT — PAIN SCALES - WONG BAKER
WONGBAKER_NUMERICALRESPONSE: HURTS LITTLE MORE

## 2024-07-15 ASSESSMENT — PAIN DESCRIPTION - ORIENTATION
ORIENTATION: RIGHT
ORIENTATION: LEFT

## 2024-07-15 ASSESSMENT — PAIN DESCRIPTION - FREQUENCY: FREQUENCY: CONTINUOUS

## 2024-07-15 ASSESSMENT — PAIN DESCRIPTION - PAIN TYPE: TYPE: ACUTE PAIN

## 2024-07-15 NOTE — PROGRESS NOTES
[facility]    [facility]   IN-PATIENT SERVICE   [facility]    Progress Note    7/15/2024    6:06 PM    Name:   Eliz Macias  MRN:     [id number]     Acct:      [id number]   Room:   0403/0403-01   Day:  11  Admit Date:  7/4/2024 10:41 PM    PCP:   Lee Sainz MD  Code Status:  DNR-CCA    Subjective:     C/C: No chief complaint on file.  Femoral fracture  Interval History Status: not changed.     Seen at bedside, hemodynamically stable  Asking for cough medicine today, otherwise looks comfortable, family at bedside updated  Has been working with therapy  Working on placement    Brief History:       Per my colleague   \"Eliz Macias is a 88 y.o. Non- / non  female who presents with fall resulted in fracture transferred for surgical intervention   and is admitted to the hospital for the management of Fracture of unspecified part of neck of right femur, initial encounter for closed fracture (HCC).     88-year-old female Kinyarwanda speaking with past medical history of diabetes mellitus type 2, CAD heart attack 4 years ago on aspirin and Plavix, hypertension, hyperlipidemia presented to ED at outside hospital after a mechanical fall on 7/3 around 6 PM in the living room.  Since then patient has been non weightbearing on her right hip.  No loss of consciousness denies hitting her head.  On the morning of 7/4 patient tried  to ambulate but she could not and then presented to ED.     On my evaluation  was called, despite multiple attempts patient was not understanding Italian through .  Family was also not at bedside.  She intermittently nods her head to some of the question but unable to get any history from her.  Patient was not in pain.  Patient coughed when I was present there and also patient noted to be slightly increased work of breathing.     While in ED patient was noted to be hypoxic 86% on room air placed on supplemental oxygen.  EKG done  polyethylene glycol, acetaminophen **OR** acetaminophen, glucose, dextrose bolus **OR** dextrose bolus, glucagon (rDNA), dextrose    Data:     Past Medical History:   has a past medical history of CAD (coronary artery disease), Dementia (HCC), Diabetes mellitus (HCC), Fall, Hyperlipidemia, Hypertension, and Parkinsonian features.    Social History:   reports that she has never smoked. She has never used smokeless tobacco. Alcohol use questions deferred to the physician. Drug use questions deferred to the physician.     Family History: History reviewed. No pertinent family history.    Vitals:  BP (!) 162/73   Pulse 66   Temp 98.8 °F (37.1 °C) (Oral)   Resp 18   Ht 1.65 m (5' 4.96\")   Wt 75 kg (165 lb 5.5 oz)   SpO2 92%   BMI 27.55 kg/m²   Temp (24hrs), Av.4 °F (36.9 °C), Min:97.7 °F (36.5 °C), Max:98.8 °F (37.1 °C)    Recent Labs     24  1204 24  1605 07/14/24  2043 07/15/24  1513   POCGLU 320* 281* 313* 349*         I/O (24Hr):    Intake/Output Summary (Last 24 hours) at 7/15/2024 1806  Last data filed at 7/15/2024 1253  Gross per 24 hour   Intake 572.43 ml   Output 1100 ml   Net -527.57 ml         Labs:  Hematology:  Recent Labs     24  0326   WBC 9.6   RBC 3.83*   HGB 10.2*   HCT 32.6*   MCV 85.1   MCH 26.6   MCHC 31.3   RDW 16.9*      MPV 10.5       Chemistry:  Recent Labs     24  0326   *   K 3.9   CL 99   CO2 21   GLUCOSE 261*   BUN 25*   CREATININE 0.8   ANIONGAP 11   LABGLOM 70   CALCIUM 8.4*       Recent Labs     24  1944 24  0635 24  1204 24  1605 07/14/24  2043 07/15/24  1513   POCGLU 267* 212* 320* 281* 313* 349*       ABG:  Lab Results   Component Value Date/Time    FIO2 INFORMATION NOT PROVIDED 2024 12:17 PM     No results found for: \"SPECIAL\"  Lab Results   Component Value Date/Time    CULTURE NO GROWTH 2024 06:32 PM       Radiology:  XR FEMUR RIGHT (MIN 2 VIEWS)    Result Date: 2024  1. Redemonstration of mildly

## 2024-07-15 NOTE — PLAN OF CARE
Problem: Discharge Planning  Goal: Discharge to home or other facility with appropriate resources  Outcome: Progressing  Flowsheets  Taken 7/15/2024 1600  Discharge to home or other facility with appropriate resources: Identify barriers to discharge with patient and caregiver  Taken 7/15/2024 0800  Discharge to home or other facility with appropriate resources: Identify barriers to discharge with patient and caregiver     Problem: Safety - Adult  Goal: Free from fall injury  Outcome: Progressing     Problem: Skin/Tissue Integrity  Goal: Absence of new skin breakdown  Description: 1.  Monitor for areas of redness and/or skin breakdown  2.  Assess vascular access sites hourly  3.  Every 4-6 hours minimum:  Change oxygen saturation probe site  4.  Every 4-6 hours:  If on nasal continuous positive airway pressure, respiratory therapy assess nares and determine need for appliance change or resting period.  Outcome: Progressing     Problem: ABCDS Injury Assessment  Goal: Absence of physical injury  Outcome: Progressing     Problem: Chronic Conditions and Co-morbidities  Goal: Patient's chronic conditions and co-morbidity symptoms are monitored and maintained or improved  Outcome: Progressing  Flowsheets  Taken 7/15/2024 1600  Care Plan - Patient's Chronic Conditions and Co-Morbidity Symptoms are Monitored and Maintained or Improved: Monitor and assess patient's chronic conditions and comorbid symptoms for stability, deterioration, or improvement  Taken 7/15/2024 0800  Care Plan - Patient's Chronic Conditions and Co-Morbidity Symptoms are Monitored and Maintained or Improved: Monitor and assess patient's chronic conditions and comorbid symptoms for stability, deterioration, or improvement     Problem: Pain  Goal: Verbalizes/displays adequate comfort level or baseline comfort level  Outcome: Progressing  Flowsheets (Taken 7/15/2024 1200)  Verbalizes/displays adequate comfort level or baseline comfort level: Encourage

## 2024-07-15 NOTE — PROGRESS NOTES
Patient has a large area of red, weeping rash on vagina and omega area. MD notified. Order received for Nystatin Powder.

## 2024-07-15 NOTE — DISCHARGE INSTR - COC
Continuity of Care Form    Patient Name: Eliz Macias   :  1935  MRN:  [id number]    Admit date:  2024  Discharge date:  24    Code Status Order: DNR-CCA   Advance Directives:   Advance Care Flowsheet Documentation       Date/Time Healthcare Directive Type of Healthcare Directive Copy in Chart Healthcare Agent Appointed Healthcare Agent's Name Healthcare Agent's Phone Number    24 1136 No, patient does not have an advance directive for healthcare treatment -- -- -- -- --            Admitting Physician:  Celine Martinez Sra, MD  PCP: Lee Sainz MD    Discharging Nurse: antonio gray  Discharging Hospital Unit/Room#: 0403/0403-01  Discharging Unit Phone Number: [phone]     Emergency Contact:   Extended Emergency Contact Information  Primary Emergency Contact: Deepika Macias  Home Phone: [phone]  Relation: Child  Preferred language: English   needed? No    Past Surgical History:  Past Surgical History:   Procedure Laterality Date    HIP SURGERY Right 2024    RIGHT HIP HEMIARTHROPLASTY - Right    HIP SURGERY Right 2024    RIGHT HIP [facility]EM[facility]AR[facility]OPLAST[facility]       Immunization History:   Immunization History   Administered Date(s) Administered    COVID-19, PFIZER PURPLE top, DILUTE for use, (age 12 y+), 30mcg/0.3mL 2021, 2021, 2021    Influenza, FLUAD, (age 65 y+), Adjuvanted, 0.5mL 10/05/2021, 2022, 2023       Active Problems:  Patient Active Problem List   Diagnosis Code    Intracranial hemorrhage (HCC) I62.9    Subarachnoid hemorrhage following injury, no loss of consciousness ([facility]) S06.6X0A    Fx humeral neck, right, closed, initial encounter S42.211A    Alzheimer's disease with early onset (HCC) G30.0, F02.80    Chronic diastolic congestive heart failure (HCC) I50.32    Coronary artery disease involving native coronary artery of native heart without angina pectoris I25.10    DDD (degenerative disc

## 2024-07-15 NOTE — PLAN OF CARE
Problem: Discharge Planning  Goal: Discharge to home or other facility with appropriate resources  7/15/2024 0037 by Shanelle Mesa RN  Outcome: Progressing     Problem: Safety - Adult  Goal: Free from fall injury  7/15/2024 0037 by Shanelle Mesa RN  Outcome: Progressing     Problem: Skin/Tissue Integrity  Goal: Absence of new skin breakdown  Description: 1.  Monitor for areas of redness and/or skin breakdown  2.  Assess vascular access sites hourly  3.  Every 4-6 hours minimum:  Change oxygen saturation probe site  4.  Every 4-6 hours:  If on nasal continuous positive airway pressure, respiratory therapy assess nares and determine need for appliance change or resting period.  7/15/2024 0037 by Shanelle Mesa RN  Outcome: Progressing     Problem: ABCDS Injury Assessment  Goal: Absence of physical injury  7/15/2024 0037 by Shanelle Mesa RN  Outcome: Progressing     Problem: Chronic Conditions and Co-morbidities  Goal: Patient's chronic conditions and co-morbidity symptoms are monitored and maintained or improved  7/15/2024 0037 by Shanelle Mesa, RN  Outcome: Progressing     Problem: Pain  Goal: Verbalizes/displays adequate comfort level or baseline comfort level  7/15/2024 0037 by Shanelle Mesa, RN  Outcome: Progressing

## 2024-07-16 LAB
GLUCOSE BLD-MCNC: 144 MG/DL (ref 65–105)
GLUCOSE BLD-MCNC: 216 MG/DL (ref 65–105)
GLUCOSE BLD-MCNC: 256 MG/DL (ref 65–105)
GLUCOSE BLD-MCNC: 259 MG/DL (ref 65–105)

## 2024-07-16 PROCEDURE — 97530 THERAPEUTIC ACTIVITIES: CPT

## 2024-07-16 PROCEDURE — 2580000003 HC RX 258: Performed by: CHIROPRACTOR

## 2024-07-16 PROCEDURE — 2580000003 HC RX 258: Performed by: STUDENT IN AN ORGANIZED HEALTH CARE EDUCATION/TRAINING PROGRAM

## 2024-07-16 PROCEDURE — 6370000000 HC RX 637 (ALT 250 FOR IP): Performed by: STUDENT IN AN ORGANIZED HEALTH CARE EDUCATION/TRAINING PROGRAM

## 2024-07-16 PROCEDURE — 99232 SBSQ HOSP IP/OBS MODERATE 35: CPT | Performed by: STUDENT IN AN ORGANIZED HEALTH CARE EDUCATION/TRAINING PROGRAM

## 2024-07-16 PROCEDURE — 2060000000 HC ICU INTERMEDIATE R&B

## 2024-07-16 PROCEDURE — 97110 THERAPEUTIC EXERCISES: CPT

## 2024-07-16 PROCEDURE — 6370000000 HC RX 637 (ALT 250 FOR IP): Performed by: CHIROPRACTOR

## 2024-07-16 PROCEDURE — 82947 ASSAY GLUCOSE BLOOD QUANT: CPT

## 2024-07-16 RX ADMIN — GUAIFENESIN AND DEXTROMETHORPHAN 5 ML: 100; 10 SYRUP ORAL at 11:21

## 2024-07-16 RX ADMIN — OXYCODONE 5 MG: 5 TABLET ORAL at 06:11

## 2024-07-16 RX ADMIN — TAMSULOSIN HYDROCHLORIDE 0.4 MG: 0.4 CAPSULE ORAL at 08:48

## 2024-07-16 RX ADMIN — CARBIDOPA AND LEVODOPA 1 TABLET: 25; 100 TABLET, EXTENDED RELEASE ORAL at 08:48

## 2024-07-16 RX ADMIN — GLIPIZIDE 10 MG: 10 TABLET ORAL at 06:11

## 2024-07-16 RX ADMIN — CLOPIDOGREL BISULFATE 75 MG: 75 TABLET ORAL at 08:48

## 2024-07-16 RX ADMIN — GLIPIZIDE 10 MG: 10 TABLET ORAL at 18:51

## 2024-07-16 RX ADMIN — RIVASTIGMINE TARTRATE 3 MG: 3 CAPSULE ORAL at 08:49

## 2024-07-16 RX ADMIN — METOPROLOL TARTRATE 25 MG: 25 TABLET ORAL at 19:47

## 2024-07-16 RX ADMIN — SODIUM CHLORIDE, PRESERVATIVE FREE 10 ML: 5 INJECTION INTRAVENOUS at 09:05

## 2024-07-16 RX ADMIN — LEVOTHYROXINE SODIUM 100 MCG: 100 TABLET ORAL at 06:11

## 2024-07-16 RX ADMIN — EZETIMIBE 10 MG: 10 TABLET ORAL at 08:48

## 2024-07-16 RX ADMIN — BUSPIRONE HYDROCHLORIDE 5 MG: 5 TABLET ORAL at 19:47

## 2024-07-16 RX ADMIN — INSULIN GLARGINE 10 UNITS: 100 INJECTION, SOLUTION SUBCUTANEOUS at 08:56

## 2024-07-16 RX ADMIN — INSULIN LISPRO 4 UNITS: 100 INJECTION, SOLUTION INTRAVENOUS; SUBCUTANEOUS at 19:46

## 2024-07-16 RX ADMIN — DESMOPRESSIN ACETATE 40 MG: 0.2 TABLET ORAL at 08:48

## 2024-07-16 RX ADMIN — ANTI-FUNGAL POWDER MICONAZOLE NITRATE TALC FREE: 1.42 POWDER TOPICAL at 08:49

## 2024-07-16 RX ADMIN — SODIUM CHLORIDE, PRESERVATIVE FREE 10 ML: 5 INJECTION INTRAVENOUS at 08:48

## 2024-07-16 RX ADMIN — INSULIN GLARGINE 10 UNITS: 100 INJECTION, SOLUTION SUBCUTANEOUS at 19:46

## 2024-07-16 RX ADMIN — METOPROLOL TARTRATE 25 MG: 25 TABLET ORAL at 08:48

## 2024-07-16 RX ADMIN — INSULIN LISPRO 2 UNITS: 100 INJECTION, SOLUTION INTRAVENOUS; SUBCUTANEOUS at 13:17

## 2024-07-16 RX ADMIN — ACETAMINOPHEN 650 MG: 325 TABLET ORAL at 11:21

## 2024-07-16 RX ADMIN — Medication 1000 MCG: at 08:49

## 2024-07-16 RX ADMIN — APIXABAN 5 MG: 5 TABLET, FILM COATED ORAL at 19:51

## 2024-07-16 RX ADMIN — INSULIN LISPRO 2 UNITS: 100 INJECTION, SOLUTION INTRAVENOUS; SUBCUTANEOUS at 17:42

## 2024-07-16 RX ADMIN — CARBIDOPA AND LEVODOPA 1 TABLET: 25; 100 TABLET, EXTENDED RELEASE ORAL at 19:47

## 2024-07-16 RX ADMIN — DOCUSATE SODIUM 100 MG: 100 CAPSULE, LIQUID FILLED ORAL at 08:48

## 2024-07-16 RX ADMIN — APIXABAN 5 MG: 5 TABLET, FILM COATED ORAL at 08:48

## 2024-07-16 RX ADMIN — BUSPIRONE HYDROCHLORIDE 5 MG: 5 TABLET ORAL at 08:49

## 2024-07-16 RX ADMIN — SERTRALINE HYDROCHLORIDE 50 MG: 50 TABLET ORAL at 08:48

## 2024-07-16 RX ADMIN — OXYCODONE 5 MG: 5 TABLET ORAL at 11:21

## 2024-07-16 RX ADMIN — RIVASTIGMINE TARTRATE 3 MG: 3 CAPSULE ORAL at 19:47

## 2024-07-16 RX ADMIN — ANTI-FUNGAL POWDER MICONAZOLE NITRATE TALC FREE: 1.42 POWDER TOPICAL at 19:48

## 2024-07-16 ASSESSMENT — PAIN DESCRIPTION - DESCRIPTORS
DESCRIPTORS: ACHING;CRAMPING;DISCOMFORT
DESCRIPTORS: ACHING;CRAMPING;DISCOMFORT

## 2024-07-16 ASSESSMENT — PAIN SCALES - GENERAL
PAINLEVEL_OUTOF10: 5
PAINLEVEL_OUTOF10: 4
PAINLEVEL_OUTOF10: 4
PAINLEVEL_OUTOF10: 9
PAINLEVEL_OUTOF10: 10

## 2024-07-16 ASSESSMENT — PAIN DESCRIPTION - ORIENTATION
ORIENTATION: LEFT
ORIENTATION: LEFT

## 2024-07-16 ASSESSMENT — PAIN SCALES - WONG BAKER
WONGBAKER_NUMERICALRESPONSE: HURTS LITTLE MORE

## 2024-07-16 ASSESSMENT — PAIN - FUNCTIONAL ASSESSMENT: PAIN_FUNCTIONAL_ASSESSMENT: PREVENTS OR INTERFERES WITH ALL ACTIVE AND SOME PASSIVE ACTIVITIES

## 2024-07-16 ASSESSMENT — PAIN DESCRIPTION - LOCATION
LOCATION: HIP
LOCATION: HIP

## 2024-07-16 NOTE — PROGRESS NOTES
Physical Therapy  Facility/Department: [address] STEPDOWN  Physical Therapy Treatment Note    Name: Eliz Macias  : 1935  MRN: [id number]  Date of Service: 2024    Discharge Recommendations:  Patient would benefit from continued therapy after discharge   PT Equipment Recommendations  Equipment Needed: No      Patient Diagnosis(es): The encounter diagnosis was Shortness of breath.  Past Medical History:  has a past medical history of CAD (coronary artery disease), Dementia (HCC), Diabetes mellitus (HCC), Fall, Hyperlipidemia, Hypertension, and Parkinsonian features.  Past Surgical History:  has a past surgical history that includes hip surgery (Right, 2024) and hip surgery (Right, 2024).    Assessment   Body Structures, Functions, Activity Limitations Requiring Skilled Therapeutic Intervention: Decreased functional mobility ;Decreased strength;Decreased endurance;Decreased balance;Increased pain;Decreased posture;Decreased ROM;Decreased safe awareness;Decreased cognition  Assessment: Pt required maxA  for bed mobiltiy, once seated balance was established was able to remain sitting EOB ~35mins performing seated and dynmaic exercises with pt initially being a maxA at EOB and progressing to CGA. Pt stood with modA with a face to face for 2 trials for 1 min each. Pt was not able to perform any steps this date. Currently unsafe to return to prior living arrangement, would benefit from continued PT to maximize safety and independence.  Therapy Prognosis: Good  Barriers to Learning: langauge barrier/cognition  Activity Tolerance  Activity Tolerance: Patient limited by endurance;Patient limited by pain;Treatment limited secondary to medical complications;Treatment limited secondary to decreased cognition     Plan   Physical Therapy Plan  General Plan:  (5-6x/wk)  Specific Instructions for Next Treatment: Progress activity and encourage out of bed activity  Current Treatment Recommendations: Strengthening,  ear.  Subjective  Subjective: Pt retired to bed at end of session with call light in reach and positioned for comfort with abductor pillow in place.            Cognition   Orientation  Overall Orientation Status: Impaired  Orientation Level: Unable to assess  Cognition  Overall Cognitive Status: Exceptions  Arousal/Alertness: Impaired;Delayed responses to stimuli;Inconsistent responses to stimuli  Following Commands: Follows one step commands with increased time;Follows one step commands with repetition;Inconsistently follows commands  Safety Judgement: Impaired  Problem Solving: Impaired  Insights: Not aware of deficits  Initiation: Requires cues for all  Sequencing: Requires cues for all     Objective   Bed mobility  Supine to Sit: Maximum assistance  Sit to Supine: Maximum assistance  Scooting: Maximal assistance  Bed Mobility Comments: HOB elevated with pt attempting to help  Transfers  Sit to Stand: Moderate Assistance  Stand to Sit: Moderate Assistance  Comment: pt stood for 2 trials for 1 min with face to face modA with pt not able to step foward or lateral with daughter prompting her  Ambulation  Comments: unable to step laterally, limited by R hip pain and dec cognition  More Ambulation?: No  Stairs/Curb  Stairs?: No     Balance  Posture: Fair  Sitting - Static: Fair;-  Sitting - Dynamic: Fair;-  Standing - Static: Poor  Comments: seated balance assessed EOB with pt seated EOB performing static and dynamic exercises. Pt initially a maxA and progressed to CGA. Standing assessed with face to face with pt resting head into pt's chest  Exercise Treatment: ankle pumps x20  A/AROM Exercises: LAQ, ham curl, bicep curl, shrugs x15  Dynamic Sitting Balance Exercises: lateral weight shifting into pillow to help awaken pt  x15             AM-PAC - Mobility    AM-PAC Basic Mobility - Inpatient   How much help is needed turning from your back to your side while in a flat bed without using bedrails?: Total  How much help

## 2024-07-16 NOTE — PLAN OF CARE
Problem: Discharge Planning  Goal: Discharge to home or other facility with appropriate resources  Outcome: Progressing  Flowsheets (Taken 7/16/2024 0800)  Discharge to home or other facility with appropriate resources: Identify barriers to discharge with patient and caregiver     Problem: Safety - Adult  Goal: Free from fall injury  Outcome: Progressing     Problem: Skin/Tissue Integrity  Goal: Absence of new skin breakdown  Description: 1.  Monitor for areas of redness and/or skin breakdown  2.  Assess vascular access sites hourly  3.  Every 4-6 hours minimum:  Change oxygen saturation probe site  4.  Every 4-6 hours:  If on nasal continuous positive airway pressure, respiratory therapy assess nares and determine need for appliance change or resting period.  Outcome: Progressing     Problem: ABCDS Injury Assessment  Goal: Absence of physical injury  Outcome: Progressing     Problem: Chronic Conditions and Co-morbidities  Goal: Patient's chronic conditions and co-morbidity symptoms are monitored and maintained or improved  Outcome: Progressing  Flowsheets (Taken 7/16/2024 0800)  Care Plan - Patient's Chronic Conditions and Co-Morbidity Symptoms are Monitored and Maintained or Improved: Monitor and assess patient's chronic conditions and comorbid symptoms for stability, deterioration, or improvement     Problem: Pain  Goal: Verbalizes/displays adequate comfort level or baseline comfort level  Outcome: Progressing  Flowsheets (Taken 7/16/2024 0800)  Verbalizes/displays adequate comfort level or baseline comfort level:   Encourage patient to monitor pain and request assistance   Assess pain using appropriate pain scale   Consider cultural and social influences on pain and pain management

## 2024-07-16 NOTE — PLAN OF CARE
Problem: Discharge Planning  Goal: Discharge to home or other facility with appropriate resources  7/15/2024 2042 by Suzanna Perez RN  Outcome: Progressing  7/15/2024 1722 by Chuyita Mcpherson RN  Outcome: Progressing  Flowsheets  Taken 7/15/2024 1600  Discharge to home or other facility with appropriate resources: Identify barriers to discharge with patient and caregiver  Taken 7/15/2024 0800  Discharge to home or other facility with appropriate resources: Identify barriers to discharge with patient and caregiver     Problem: Safety - Adult  Goal: Free from fall injury  7/15/2024 2042 by Suzanna Perez RN  Outcome: Progressing  7/15/2024 1722 by Chuyita Mcpherson RN  Outcome: Progressing     Problem: Skin/Tissue Integrity  Goal: Absence of new skin breakdown  Description: 1.  Monitor for areas of redness and/or skin breakdown  2.  Assess vascular access sites hourly  3.  Every 4-6 hours minimum:  Change oxygen saturation probe site  4.  Every 4-6 hours:  If on nasal continuous positive airway pressure, respiratory therapy assess nares and determine need for appliance change or resting period.  7/15/2024 2042 by Suzanna Perez RN  Outcome: Progressing  7/15/2024 1722 by Chuyita Mcpherson RN  Outcome: Progressing     Problem: ABCDS Injury Assessment  Goal: Absence of physical injury  7/15/2024 2042 by Suzanna Perez RN  Outcome: Progressing  7/15/2024 1722 by Chuyita Mcpherson RN  Outcome: Progressing     Problem: Chronic Conditions and Co-morbidities  Goal: Patient's chronic conditions and co-morbidity symptoms are monitored and maintained or improved  7/15/2024 2042 by Suzanna Perez RN  Outcome: Progressing  7/15/2024 1722 by Chuyita Mcpherson RN  Outcome: Progressing  Flowsheets  Taken 7/15/2024 1600  Care Plan - Patient's Chronic Conditions and Co-Morbidity Symptoms are Monitored and Maintained or Improved: Monitor and assess patient's chronic conditions and comorbid symptoms for stability, deterioration, or

## 2024-07-16 NOTE — PROGRESS NOTES
Comprehensive Nutrition Assessment    Type and Reason for Visit:  Reassess    Nutrition Recommendations/Plan:   Continue ONS TID and CCHO diet  Recommend order ONS at ECF     Malnutrition Assessment:  Malnutrition Status:  At risk for malnutrition (Comment) (due to decreased po intake and weight loss) (07/11/24 1119)    Context:  Chronic Illness     Findings of the 6 clinical characteristics of malnutrition:  Energy Intake:  Mild decrease in energy intake (Comment) (PO intake 26-75%)  Weight Loss:  Mild weight loss (specify amount and time period) (9% over 6 months)     Body Fat Loss:  Unable to assess     Muscle Mass Loss:  Unable to assess    Fluid Accumulation:  No significant fluid accumulation     Strength:  Not Performed    Nutrition Assessment:    follow up - patient on CCHO diet with ONS ordered TID. PO intake is improving, still variable 0-100%. Pt is pending placement.    Nutrition Related Findings:    last BM 7/15; no edema noted; Na 131. Labs/meds reviewed. Wound Type: Surgical Incision (rash)       Current Nutrition Intake & Therapies:    Average Meal Intake:  (Variable 0-100%)  Average Supplements Intake:  (variable)  ADULT DIET; Regular; 4 carb choices (60 gm/meal); No Pork  ADULT ORAL NUTRITION SUPPLEMENT; Breakfast, Lunch, Dinner; Diabetic Oral Supplement    Anthropometric Measures:  Height: 165 cm (5' 4.96\")  Ideal Body Weight (IBW): 125 lbs (57 kg)    Admission Body Weight: 69.9 kg (154 lb 1.6 oz)  Current Body Weight: 75 kg (165 lb 5.5 oz) (estimated per RN flowsheet), 132.3 % IBW. Weight Source: Bed Scale  Current BMI (kg/m2): 27.5  Usual Body Weight: 77 kg (169 lb 12.1 oz)  % Weight Change (Calculated): -2.6  Weight Adjustment For: No Adjustment                 BMI Categories: Overweight (BMI 25.0-29.9)    Estimated Daily Nutrient Needs:  Energy Requirements Based On: Kcal/kg  Weight Used for Energy Requirements: Admission  Energy (kcal/day): 3122-0673 kcal (25-30 kcal/kg)  Weight Used for

## 2024-07-16 NOTE — PROGRESS NOTES
@[facility] HospitalED[facility]GO@    [facility]   IN-PATIENT SERVICE   [facility]    Progress Note    7/16/2024    2:42 PM    Name:   Eliz Macias  MRN:     [id number]     Acct:      [id number]   Room:   [facility]0403-[facility] Day:  12  Admit Date:  7/4/2024 10:41 PM    PCP:   Lee Sainz MD  Code Status:  DNR-CCA    Subjective:     C/C: No chief complaint on file.  Femoral fracture  Interval History Status: not changed.     Seen at bedside, hemodynamically stable, patient looks pleasant and in no distress  No new complaints, no acute events overnight  Has been working with therapy, family at bedside updated  Working on placement    Brief History:     88-year-old female with history of diabetes mellitus, coronary artery disease, essential hypertension, hyperlipidemia, Alzheimer disease,, Parkinson disease, depression, who presented after mechanical fall on 7/3, since then being not weightbearing on her right hip, no loss of consciousness or hitting of head, could not ambulate on 7/4 and then presented to the ER, was found to be hypoxic on arrival, placed on supplemental oxygen, x-ray hip showed slightly displaced right femoral neck fracture, chest x-ray with atelectasis/chronic changes, no congestion or consolidation, patient underwent hip arthroplasty right side by Ortho surgery, was also found to be rhinovirus positive, repeat chest x-ray concerning for pneumonia as well as congestion, started on Levaquin for pneumonia/UTI, completed the course, during her stay in hospital had new onset A-fib with RVR, was started on heparin, beta-blocker, amiodarone drip, taken off amiodarone due to hypotension, heart rate has improved, currently on Eliquis    Has been awaiting placement    Medications:     Allergies:  No Known Allergies    Current Meds:   Scheduled Meds:    miconazole   Topical BID    glipiZIDE  10 mg Oral BID AC    apixaban  5 mg Oral BID    metFORMIN  500 mg Oral BID WC    sodium chloride flush

## 2024-07-16 NOTE — CARE COORDINATION
Transitional planning note: plan is [facility]. Spoke with pedro at [facility] who states that insurance auth is still pending.

## 2024-07-17 VITALS
BODY MASS INDEX: 27.55 KG/M2 | OXYGEN SATURATION: 94 % | HEIGHT: 65 IN | WEIGHT: 165.34 LBS | DIASTOLIC BLOOD PRESSURE: 70 MMHG | TEMPERATURE: 98 F | RESPIRATION RATE: 19 BRPM | HEART RATE: 63 BPM | SYSTOLIC BLOOD PRESSURE: 123 MMHG

## 2024-07-17 LAB
GLUCOSE BLD-MCNC: 135 MG/DL (ref 65–105)
GLUCOSE BLD-MCNC: 252 MG/DL (ref 65–105)
GLUCOSE BLD-MCNC: 276 MG/DL (ref 65–105)

## 2024-07-17 PROCEDURE — 6370000000 HC RX 637 (ALT 250 FOR IP): Performed by: CHIROPRACTOR

## 2024-07-17 PROCEDURE — 2580000003 HC RX 258: Performed by: STUDENT IN AN ORGANIZED HEALTH CARE EDUCATION/TRAINING PROGRAM

## 2024-07-17 PROCEDURE — 82947 ASSAY GLUCOSE BLOOD QUANT: CPT

## 2024-07-17 PROCEDURE — 6370000000 HC RX 637 (ALT 250 FOR IP): Performed by: STUDENT IN AN ORGANIZED HEALTH CARE EDUCATION/TRAINING PROGRAM

## 2024-07-17 PROCEDURE — 97530 THERAPEUTIC ACTIVITIES: CPT

## 2024-07-17 PROCEDURE — 97535 SELF CARE MNGMENT TRAINING: CPT

## 2024-07-17 PROCEDURE — 99231 SBSQ HOSP IP/OBS SF/LOW 25: CPT | Performed by: STUDENT IN AN ORGANIZED HEALTH CARE EDUCATION/TRAINING PROGRAM

## 2024-07-17 RX ADMIN — DESMOPRESSIN ACETATE 40 MG: 0.2 TABLET ORAL at 08:24

## 2024-07-17 RX ADMIN — OXYCODONE 5 MG: 5 TABLET ORAL at 00:21

## 2024-07-17 RX ADMIN — METOPROLOL TARTRATE 25 MG: 25 TABLET ORAL at 08:24

## 2024-07-17 RX ADMIN — CARBIDOPA AND LEVODOPA 1 TABLET: 25; 100 TABLET, EXTENDED RELEASE ORAL at 08:24

## 2024-07-17 RX ADMIN — INSULIN LISPRO 4 UNITS: 100 INJECTION, SOLUTION INTRAVENOUS; SUBCUTANEOUS at 11:45

## 2024-07-17 RX ADMIN — INSULIN GLARGINE 10 UNITS: 100 INJECTION, SOLUTION SUBCUTANEOUS at 08:24

## 2024-07-17 RX ADMIN — SODIUM CHLORIDE, PRESERVATIVE FREE 10 ML: 5 INJECTION INTRAVENOUS at 08:25

## 2024-07-17 RX ADMIN — GLIPIZIDE 10 MG: 10 TABLET ORAL at 16:45

## 2024-07-17 RX ADMIN — BUSPIRONE HYDROCHLORIDE 5 MG: 5 TABLET ORAL at 08:24

## 2024-07-17 RX ADMIN — TAMSULOSIN HYDROCHLORIDE 0.4 MG: 0.4 CAPSULE ORAL at 08:24

## 2024-07-17 RX ADMIN — APIXABAN 5 MG: 5 TABLET, FILM COATED ORAL at 08:24

## 2024-07-17 RX ADMIN — FUROSEMIDE 20 MG: 20 TABLET ORAL at 08:24

## 2024-07-17 RX ADMIN — RIVASTIGMINE TARTRATE 3 MG: 3 CAPSULE ORAL at 08:24

## 2024-07-17 RX ADMIN — ANTI-FUNGAL POWDER MICONAZOLE NITRATE TALC FREE: 1.42 POWDER TOPICAL at 08:25

## 2024-07-17 RX ADMIN — METFORMIN HYDROCHLORIDE 500 MG: 500 TABLET ORAL at 08:24

## 2024-07-17 RX ADMIN — LEVOTHYROXINE SODIUM 100 MCG: 100 TABLET ORAL at 06:31

## 2024-07-17 RX ADMIN — Medication 1000 MCG: at 08:24

## 2024-07-17 RX ADMIN — SERTRALINE HYDROCHLORIDE 50 MG: 50 TABLET ORAL at 08:24

## 2024-07-17 RX ADMIN — ACETAMINOPHEN 650 MG: 325 TABLET ORAL at 00:18

## 2024-07-17 RX ADMIN — GLIPIZIDE 10 MG: 10 TABLET ORAL at 06:31

## 2024-07-17 RX ADMIN — INSULIN LISPRO 4 UNITS: 100 INJECTION, SOLUTION INTRAVENOUS; SUBCUTANEOUS at 16:45

## 2024-07-17 RX ADMIN — EZETIMIBE 10 MG: 10 TABLET ORAL at 08:35

## 2024-07-17 RX ADMIN — METFORMIN HYDROCHLORIDE 500 MG: 500 TABLET ORAL at 16:45

## 2024-07-17 RX ADMIN — OXYCODONE 5 MG: 5 TABLET ORAL at 09:07

## 2024-07-17 RX ADMIN — CLOPIDOGREL BISULFATE 75 MG: 75 TABLET ORAL at 08:24

## 2024-07-17 RX ADMIN — ACETAMINOPHEN 650 MG: 325 TABLET ORAL at 06:31

## 2024-07-17 RX ADMIN — DOCUSATE SODIUM 100 MG: 100 CAPSULE, LIQUID FILLED ORAL at 08:24

## 2024-07-17 ASSESSMENT — PAIN SCALES - GENERAL
PAINLEVEL_OUTOF10: 6
PAINLEVEL_OUTOF10: 4
PAINLEVEL_OUTOF10: 4
PAINLEVEL_OUTOF10: 3
PAINLEVEL_OUTOF10: 3

## 2024-07-17 ASSESSMENT — PAIN DESCRIPTION - LOCATION
LOCATION: HEAD
LOCATION: HIP

## 2024-07-17 ASSESSMENT — PAIN SCALES - WONG BAKER: WONGBAKER_NUMERICALRESPONSE: HURTS A LITTLE BIT

## 2024-07-17 NOTE — PLAN OF CARE
Problem: Discharge Planning  Goal: Discharge to home or other facility with appropriate resources  Outcome: Completed     Problem: Safety - Adult  Goal: Free from fall injury  Outcome: Completed     Problem: Skin/Tissue Integrity  Goal: Absence of new skin breakdown  Description: 1.  Monitor for areas of redness and/or skin breakdown  2.  Assess vascular access sites hourly  3.  Every 4-6 hours minimum:  Change oxygen saturation probe site  4.  Every 4-6 hours:  If on nasal continuous positive airway pressure, respiratory therapy assess nares and determine need for appliance change or resting period.  Outcome: Completed     Problem: ABCDS Injury Assessment  Goal: Absence of physical injury  Outcome: Completed     Problem: Chronic Conditions and Co-morbidities  Goal: Patient's chronic conditions and co-morbidity symptoms are monitored and maintained or improved  Outcome: Completed     Problem: Pain  Goal: Verbalizes/displays adequate comfort level or baseline comfort level  Outcome: Completed

## 2024-07-17 NOTE — CARE COORDINATION
Transitional planning note: plan remains [facility]. Call placed to Lizz in admissions with [facility] who states that auth remains pending with [employer]. She relays that the case has been escalated with the insurance company as auth has been pending since 7/11/24

## 2024-07-17 NOTE — DISCHARGE SUMMARY
mins in patient examination, evaluation, counseling as well as medication reconciliation, prescriptions for required medications, discharge plan and follow up.    Electronically signed by   Karlo Gibbs MD  7/17/2024  12:15 PM      Thank you Lee Moya MD for the opportunity to be involved in this patient's care.

## 2024-07-17 NOTE — PROGRESS NOTES
@[email or web address]@    [facility]   IN-PATIENT SERVICE   [facility]    Progress Note    7/17/2024    12:09 PM    Name:   Eliz Macias  MRN:     [id number]     Acct:      [id number]   Room:   [facility]0403-01   Day:  13  Admit Date:  7/4/2024 10:41 PM    PCP:   Lee Sainz MD  Code Status:  DNR-CCA    Subjective:     C/C: No chief complaint on file.  Femoral fracture  Interval History Status: not changed.     Seen at bedside,daughter also at bedside. Attempted to use . Patient is hard of hearing and couldn't follow  requiring daughter to fill in. Uses Finnish Georgian specifically. Daughter can also speak portugese.     Brief History:     88-year-old female with history of diabetes mellitus, coronary artery disease, essential hypertension, hyperlipidemia, Alzheimer disease,, Parkinson disease, depression, who presented after mechanical fall on 7/3, since then being not weightbearing on her right hip, no loss of consciousness or hitting of head, could not ambulate on 7/4 and then presented to the ER, was found to be hypoxic on arrival, placed on supplemental oxygen, x-ray hip showed slightly displaced right femoral neck fracture. patient underwent hip arthroplasty right side by Ortho surgery, and found to have rhinovirus. Completed levaquin. during her stay in hospital had new onset A-fib with RVR, was started on heparin, beta-blocker, amiodarone drip, taken off amiodarone due to hypotension, heart rate has improved, currently on Eliquis. Waiting placement.     Medications:     Allergies:  No Known Allergies    Current Meds:   Scheduled Meds:    miconazole   Topical BID    glipiZIDE  10 mg Oral BID AC    apixaban  5 mg Oral BID    metFORMIN  500 mg Oral BID WC    sodium chloride flush  5-40 mL IntraVENous 2 times per day    insulin glargine  10 Units SubCUTAneous Daily    insulin glargine  10 Units SubCUTAneous Nightly    tamsulosin  0.4 mg Oral Daily

## 2024-07-17 NOTE — PROGRESS NOTES
Informed patient's daughter of patient being discharged today however unable of time but will update her.

## 2024-07-17 NOTE — PROGRESS NOTES
Occupational Therapy  [facility] [address] [facility]  [facility]upational Therapy Daily Treatment Note    Name: Eliz Macias  : 1935  MRN: [id number]  Date of Service: 2024    Discharge Recommendations:  Patient would benefit from continued therapy after discharge     Patient Diagnosis(es): The encounter diagnosis was Shortness of breath.  Past Medical History:  has a past medical history of CAD (coronary artery disease), Dementia (HCC), Diabetes mellitus (HCC), Fall, Hyperlipidemia, Hypertension, and Parkinsonian features.  Past Surgical History:  has a past surgical history that includes hip surgery (Right, 2024) and hip surgery (Right, 2024).       Assessment   Performance deficits / Impairments: Decreased functional mobility ;Decreased ADL status;Decreased safe awareness;Decreased cognition;Decreased high-level IADLs;Decreased balance;Decreased endurance  Assessment: Pt participated with use of gestures and translater use with daughter present with fair responses. Pt completed simple ADL tasks as stated below with initial hand over hand assist for all tasks. Daughter aiding in translating with  use as well.  Pt limited by above deficits impacting functional performance. Pt would benefit from continued therapy prior to and following discharge from acute setting to increase safety and IND in self care.  Prognosis: Fair  Activity Tolerance  Activity Tolerance: Patient Tolerated treatment well;Treatment limited secondary to decreased cognition  Activity Tolerance Comments: language barrier        Plan   Occupational Therapy Plan  Times Per Week: 3-5x/week  Current Treatment Recommendations: Functional mobility training, Endurance training, Safety education & training, Patient/Caregiver education & training, Pain management, Self-Care / ADL, Home management training, Balance training, Equipment evaluation, education, & procurement      Restrictions  Restrictions/Precautions  Restrictions/Precautions: Fall Risk, Bed Alarm, Up as Tolerated, Weight Bearing, Surgical Protocols, ROM Restrictions, General Precautions  Required Braces or Orthoses?: No  Lower Extremity Weight Bearing Restrictions  Right Lower Extremity Weight Bearing: Weight Bearing As Tolerated  Position Activity Restriction  Hip Precautions: No hip flexion > 90 degrees, Posterior hip precautions, No hip internal rotation, No ABduction  Other position/activity restrictions: Up with assist, Patient will be weight-bear as tolerated to the operative extremity with posterior hip precautions. Hip abduction pillow in place.    Subjective   General  Patient assessed for rehabilitation services?: Yes  Response to previous treatment: Patient with no complaints from previous session  Family / Caregiver Present: Yes (daughter)  General Comment  Comments: RN ok'd patient for OT session this date. Pt supine in bed upon CAMPBELL arrival. Daughter present.  use between this writer, daughter and patient. Pt initially sleepy but once awakened able to participate. Pt reports \"little\" pain. Pt positioned for best comfort at end of session. Session ID: [id number]  Language: Spanish (BRA)   ID: #[id number]   Name: Mirta       Objective   Safety Devices  Type of Devices: Call light within reach;Gait belt;All fall risk precautions in place;Heels elevated for pressure relief;Left in chair  Restraints  Restraints Initially in Place: No  Balance  Sitting: With support (Pt sat EOB ~20 minutes for simple ADL routine with one UE support on bedrail at all times with SBA>CGA for balance.)  Standing: With support (Pt stood 2 time with MAX Ax1 with max verbal cues for upright posture.Pt unable to achieve full upright posture this session and only tolerated 10-20 seconds in standing.)  Transfer Training  Transfer Training: Yes  Overall Level of Assistance: Maximum assistance  Interventions:

## 2024-07-17 NOTE — CARE COORDINATION
Discharge Report    [facility]  Clinical Case Management Department  Written by: Eli Palm RN    Patient Name: Eliz Macias  Attending Provider: Karlo Gibbs MD  Admit Date: 2024 10:41 PM  MRN: [id number]  Account: [id number]                     : 1935  Discharge Date: 24      Disposition: SNF    [facility]. Per sarah with [facility], auth has been approved. Faxed request to [facility] for 4PM stretcher . Paul and hens faxed to facility. Updated patient's daughter at bedside and she is agreeable to plan    Eli Palm RN

## 2024-07-17 NOTE — PLAN OF CARE
Problem: Discharge Planning  Goal: Discharge to home or other facility with appropriate resources  7/16/2024 2101 by Suzanna Perez RN  Outcome: Progressing  7/16/2024 1848 by Chuyita Mcpherson RN  Outcome: Progressing  Flowsheets (Taken 7/16/2024 0800)  Discharge to home or other facility with appropriate resources: Identify barriers to discharge with patient and caregiver     Problem: Safety - Adult  Goal: Free from fall injury  7/16/2024 2101 by Suzanna Perez RN  Outcome: Progressing  7/16/2024 1848 by Chuyita Mcpherson RN  Outcome: Progressing     Problem: Skin/Tissue Integrity  Goal: Absence of new skin breakdown  Description: 1.  Monitor for areas of redness and/or skin breakdown  2.  Assess vascular access sites hourly  3.  Every 4-6 hours minimum:  Change oxygen saturation probe site  4.  Every 4-6 hours:  If on nasal continuous positive airway pressure, respiratory therapy assess nares and determine need for appliance change or resting period.  7/16/2024 2101 by Suzanna Perez RN  Outcome: Progressing  7/16/2024 1848 by Chuyita Mcpherson RN  Outcome: Progressing     Problem: ABCDS Injury Assessment  Goal: Absence of physical injury  7/16/2024 2101 by Suzanna Perez RN  Outcome: Progressing  7/16/2024 1848 by Chuyita Mcpherson RN  Outcome: Progressing     Problem: Chronic Conditions and Co-morbidities  Goal: Patient's chronic conditions and co-morbidity symptoms are monitored and maintained or improved  7/16/2024 2101 by Suzanna Perez RN  Outcome: Progressing  7/16/2024 1848 by Chuyita Mcpherson RN  Outcome: Progressing  Flowsheets (Taken 7/16/2024 0800)  Care Plan - Patient's Chronic Conditions and Co-Morbidity Symptoms are Monitored and Maintained or Improved: Monitor and assess patient's chronic conditions and comorbid symptoms for stability, deterioration, or improvement     Problem: Pain  Goal: Verbalizes/displays adequate comfort level or baseline comfort level  7/16/2024 2101 by Suzanna Perez RN  Outcome:  Progressing  7/16/2024 1848 by Chuyita Mcpherson, RN  Outcome: Progressing  Flowsheets (Taken 7/16/2024 0800)  Verbalizes/displays adequate comfort level or baseline comfort level:   Encourage patient to monitor pain and request assistance   Assess pain using appropriate pain scale   Consider cultural and social influences on pain and pain management

## 2024-07-29 ENCOUNTER — OFFICE VISIT (OUTPATIENT)
Dept: ORTHOPEDIC SURGERY | Age: 89
End: 2024-07-29

## 2024-07-29 VITALS — BODY MASS INDEX: 27.55 KG/M2 | HEIGHT: 65 IN

## 2024-07-29 DIAGNOSIS — Z96.649 S/P HIP HEMIARTHROPLASTY: ICD-10-CM

## 2024-07-29 DIAGNOSIS — S72.001D CLOSED DISPLACED FRACTURE OF RIGHT FEMORAL NECK WITH ROUTINE HEALING: Primary | ICD-10-CM

## 2024-07-29 PROCEDURE — 99024 POSTOP FOLLOW-UP VISIT: CPT | Performed by: STUDENT IN AN ORGANIZED HEALTH CARE EDUCATION/TRAINING PROGRAM

## 2024-07-29 NOTE — PROGRESS NOTES
[facility]  [address]  [facility] [address]  Dept Phone: [phone]  Dept Fax: [phone]      Orthopaedic Trauma Clinic Follow Up      Subjective:   Date of Surgery: 7/5/2024    Eliz Macias is a 88 y.o. year old female who presents to the clinic today for follow up status post  right  Franck Hip Arthroplasty secondary to femoral neck fracture.  Patient presents clinic today with her son.  Patient is back home, where she lives with her son.  Patient had first home therapy session with physical therapy today.  Patient son states that she minimally ambulates right now, but prior to the fall she ambulated pretty well around the house for somebody \"her age\".  Patient denies any numbness or tingling.  No new injuries or falls.  Son does assist with translation given their specific dialect.    Review of Systems  Gen: no fever, chills, malaise  CV: no chest pain or palpitations  Resp: no cough or shortness of breath  GI: no nausea, vomiting, diarrhea, or constipation  Neuro: no seizures, vertigo, or headache  Msk: Per HPI  10 remaining systems reviewed and negative    Objective :   There were no vitals filed for this visit.Body mass index is 27.55 kg/m².  General: No acute distress, resting comfortably in the clinic  Neuro: alert. oriented  Eyes: Extra-ocular muscles intact  Pulm: Respirations unlabored and regular.  Skin: warm, well perfused  Psych:   Patient has good fund of knowledge and displays understanding of exam, diagnosis, and plan.  Right Lower Extremity: dermabond glue present. Wound well approximated. No erythema or signs of infection. Compartments soft/compressible. Extremity warm/well perfused. EHL/FHL/TA/GSC motor intact. Patient expresses normal sensation L3-S1 distribution.       Radiology:  Imaging studies from today were independently reviewed and read as listed below. Any relevant images obtained prior to today's visit were also independently

## 2024-08-04 PROBLEM — Z01.818 PREOPERATIVE CLEARANCE: Status: RESOLVED | Noted: 2024-07-05 | Resolved: 2024-08-04

## 2024-08-05 ENCOUNTER — APPOINTMENT (OUTPATIENT)
Dept: GENERAL RADIOLOGY | Age: 89
End: 2024-08-05
Payer: COMMERCIAL

## 2024-08-05 ENCOUNTER — HOSPITAL ENCOUNTER (INPATIENT)
Age: 89
LOS: 3 days | Discharge: HOME HEALTH | End: 2024-08-08
Attending: EMERGENCY MEDICINE | Admitting: HOSPITALIST
Payer: COMMERCIAL

## 2024-08-05 ENCOUNTER — APPOINTMENT (OUTPATIENT)
Dept: CT IMAGING | Age: 89
End: 2024-08-05
Payer: COMMERCIAL

## 2024-08-05 DIAGNOSIS — I95.9 HYPOTENSION, UNSPECIFIED HYPOTENSION TYPE: Primary | ICD-10-CM

## 2024-08-05 DIAGNOSIS — S60.212A CONTUSION OF LEFT WRIST, INITIAL ENCOUNTER: ICD-10-CM

## 2024-08-05 PROBLEM — R57.9 SHOCK (HCC): Status: ACTIVE | Noted: 2024-08-05

## 2024-08-05 LAB
ANION GAP SERPL CALCULATED.3IONS-SCNC: 13 MMOL/L (ref 9–17)
BASOPHILS # BLD: 0.1 K/UL (ref 0–0.2)
BASOPHILS NFR BLD: 1 % (ref 0–2)
BILIRUB UR QL STRIP: NEGATIVE
BUN SERPL-MCNC: 19 MG/DL (ref 8–23)
CALCIUM SERPL-MCNC: 9.4 MG/DL (ref 8.6–10.4)
CHLORIDE SERPL-SCNC: 102 MMOL/L (ref 98–107)
CLARITY UR: CLEAR
CO2 SERPL-SCNC: 24 MMOL/L (ref 20–31)
COLOR UR: YELLOW
COMMENT: ABNORMAL
CORTIS SERPL-MCNC: 52.7 UG/DL (ref 2.5–19.5)
CORTISOL COLLECTION INFO: ABNORMAL
CREAT SERPL-MCNC: 0.7 MG/DL (ref 0.5–0.9)
EOSINOPHIL # BLD: 0.1 K/UL (ref 0–0.4)
EOSINOPHILS RELATIVE PERCENT: 1 % (ref 1–4)
ERYTHROCYTE [DISTWIDTH] IN BLOOD BY AUTOMATED COUNT: 18.5 % (ref 12.5–15.4)
GFR, ESTIMATED: 84 ML/MIN/1.73M2
GLUCOSE BLD-MCNC: 165 MG/DL (ref 65–105)
GLUCOSE SERPL-MCNC: 194 MG/DL (ref 70–99)
GLUCOSE UR STRIP-MCNC: ABNORMAL MG/DL
HCT VFR BLD AUTO: 38.2 % (ref 36–46)
HGB BLD-MCNC: 12.5 G/DL (ref 12–16)
HGB UR QL STRIP.AUTO: NEGATIVE
KETONES UR STRIP-MCNC: NEGATIVE MG/DL
LACTATE BLDV-SCNC: 1.4 MMOL/L (ref 0.5–1.9)
LEUKOCYTE ESTERASE UR QL STRIP: NEGATIVE
LYMPHOCYTES NFR BLD: 0.6 K/UL (ref 1–4.8)
LYMPHOCYTES RELATIVE PERCENT: 6 % (ref 24–44)
MCH RBC QN AUTO: 27.6 PG (ref 26–34)
MCHC RBC AUTO-ENTMCNC: 32.8 G/DL (ref 31–37)
MCV RBC AUTO: 83.9 FL (ref 80–100)
MONOCYTES NFR BLD: 0.8 K/UL (ref 0.1–1.2)
MONOCYTES NFR BLD: 8 % (ref 2–11)
NEUTROPHILS NFR BLD: 84 % (ref 36–66)
NEUTS SEG NFR BLD: 9.4 K/UL (ref 1.8–7.7)
NITRITE UR QL STRIP: NEGATIVE
PH UR STRIP: 6 [PH] (ref 5–8)
PLATELET # BLD AUTO: 318 K/UL (ref 140–450)
PMV BLD AUTO: 8.5 FL (ref 6–12)
POTASSIUM SERPL-SCNC: 4.2 MMOL/L (ref 3.7–5.3)
PROT UR STRIP-MCNC: NEGATIVE MG/DL
RBC # BLD AUTO: 4.55 M/UL (ref 4–5.2)
SODIUM SERPL-SCNC: 139 MMOL/L (ref 135–144)
SP GR UR STRIP: 1.01 (ref 1–1.03)
TROPONIN I SERPL HS-MCNC: 31 NG/L (ref 0–14)
TSH SERPL DL<=0.05 MIU/L-ACNC: 1.05 UIU/ML (ref 0.3–5)
UROBILINOGEN UR STRIP-ACNC: NORMAL EU/DL (ref 0–1)
WBC OTHER # BLD: 11 K/UL (ref 3.5–11)

## 2024-08-05 PROCEDURE — 73110 X-RAY EXAM OF WRIST: CPT

## 2024-08-05 PROCEDURE — 83605 ASSAY OF LACTIC ACID: CPT

## 2024-08-05 PROCEDURE — 71045 X-RAY EXAM CHEST 1 VIEW: CPT

## 2024-08-05 PROCEDURE — 6360000002 HC RX W HCPCS: Performed by: EMERGENCY MEDICINE

## 2024-08-05 PROCEDURE — 36415 COLL VENOUS BLD VENIPUNCTURE: CPT

## 2024-08-05 PROCEDURE — 2580000003 HC RX 258: Performed by: HOSPITALIST

## 2024-08-05 PROCEDURE — 2000000000 HC ICU R&B

## 2024-08-05 PROCEDURE — 84484 ASSAY OF TROPONIN QUANT: CPT

## 2024-08-05 PROCEDURE — 70450 CT HEAD/BRAIN W/O DYE: CPT

## 2024-08-05 PROCEDURE — 80048 BASIC METABOLIC PNL TOTAL CA: CPT

## 2024-08-05 PROCEDURE — 99223 1ST HOSP IP/OBS HIGH 75: CPT | Performed by: HOSPITALIST

## 2024-08-05 PROCEDURE — 2580000003 HC RX 258: Performed by: EMERGENCY MEDICINE

## 2024-08-05 PROCEDURE — 6360000002 HC RX W HCPCS: Performed by: HOSPITALIST

## 2024-08-05 PROCEDURE — 93005 ELECTROCARDIOGRAM TRACING: CPT | Performed by: EMERGENCY MEDICINE

## 2024-08-05 PROCEDURE — 2500000003 HC RX 250 WO HCPCS: Performed by: EMERGENCY MEDICINE

## 2024-08-05 PROCEDURE — 82947 ASSAY GLUCOSE BLOOD QUANT: CPT

## 2024-08-05 PROCEDURE — 84443 ASSAY THYROID STIM HORMONE: CPT

## 2024-08-05 PROCEDURE — 2580000003 HC RX 258: Performed by: STUDENT IN AN ORGANIZED HEALTH CARE EDUCATION/TRAINING PROGRAM

## 2024-08-05 PROCEDURE — 87040 BLOOD CULTURE FOR BACTERIA: CPT

## 2024-08-05 PROCEDURE — 6370000000 HC RX 637 (ALT 250 FOR IP): Performed by: HOSPITALIST

## 2024-08-05 PROCEDURE — 6360000002 HC RX W HCPCS: Performed by: STUDENT IN AN ORGANIZED HEALTH CARE EDUCATION/TRAINING PROGRAM

## 2024-08-05 PROCEDURE — 99285 EMERGENCY DEPT VISIT HI MDM: CPT

## 2024-08-05 PROCEDURE — 85025 COMPLETE CBC W/AUTO DIFF WBC: CPT

## 2024-08-05 PROCEDURE — 81003 URINALYSIS AUTO W/O SCOPE: CPT

## 2024-08-05 PROCEDURE — 82533 TOTAL CORTISOL: CPT

## 2024-08-05 PROCEDURE — 96374 THER/PROPH/DIAG INJ IV PUSH: CPT

## 2024-08-05 RX ORDER — NOREPINEPHRINE BITARTRATE 0.06 MG/ML
1-100 INJECTION, SOLUTION INTRAVENOUS CONTINUOUS
Status: DISCONTINUED | OUTPATIENT
Start: 2024-08-05 | End: 2024-08-08 | Stop reason: HOSPADM

## 2024-08-05 RX ORDER — ACETAMINOPHEN 650 MG/1
650 SUPPOSITORY RECTAL EVERY 6 HOURS PRN
Status: DISCONTINUED | OUTPATIENT
Start: 2024-08-05 | End: 2024-08-08 | Stop reason: HOSPADM

## 2024-08-05 RX ORDER — BUSPIRONE HYDROCHLORIDE 5 MG/1
5 TABLET ORAL 2 TIMES DAILY
Status: DISCONTINUED | OUTPATIENT
Start: 2024-08-05 | End: 2024-08-08 | Stop reason: HOSPADM

## 2024-08-05 RX ORDER — SODIUM CHLORIDE 0.9 % (FLUSH) 0.9 %
5-40 SYRINGE (ML) INJECTION PRN
Status: DISCONTINUED | OUTPATIENT
Start: 2024-08-05 | End: 2024-08-08 | Stop reason: HOSPADM

## 2024-08-05 RX ORDER — ACETAMINOPHEN 325 MG/1
650 TABLET ORAL EVERY 6 HOURS PRN
Status: DISCONTINUED | OUTPATIENT
Start: 2024-08-05 | End: 2024-08-08 | Stop reason: HOSPADM

## 2024-08-05 RX ORDER — KETOROLAC TROMETHAMINE 15 MG/ML
15 INJECTION, SOLUTION INTRAMUSCULAR; INTRAVENOUS ONCE
Status: COMPLETED | OUTPATIENT
Start: 2024-08-05 | End: 2024-08-05

## 2024-08-05 RX ORDER — RIVASTIGMINE TARTRATE 1.5 MG/1
3 CAPSULE ORAL 2 TIMES DAILY
Status: DISCONTINUED | OUTPATIENT
Start: 2024-08-05 | End: 2024-08-08 | Stop reason: HOSPADM

## 2024-08-05 RX ORDER — SODIUM CHLORIDE 9 MG/ML
INJECTION, SOLUTION INTRAVENOUS CONTINUOUS
Status: DISCONTINUED | OUTPATIENT
Start: 2024-08-05 | End: 2024-08-07

## 2024-08-05 RX ORDER — DOCUSATE SODIUM 100 MG/1
100 CAPSULE, LIQUID FILLED ORAL DAILY
Status: DISCONTINUED | OUTPATIENT
Start: 2024-08-06 | End: 2024-08-08 | Stop reason: HOSPADM

## 2024-08-05 RX ORDER — SODIUM CHLORIDE 0.9 % (FLUSH) 0.9 %
5-40 SYRINGE (ML) INJECTION EVERY 12 HOURS SCHEDULED
Status: DISCONTINUED | OUTPATIENT
Start: 2024-08-05 | End: 2024-08-08 | Stop reason: HOSPADM

## 2024-08-05 RX ORDER — 0.9 % SODIUM CHLORIDE 0.9 %
1000 INTRAVENOUS SOLUTION INTRAVENOUS ONCE
Status: COMPLETED | OUTPATIENT
Start: 2024-08-05 | End: 2024-08-05

## 2024-08-05 RX ORDER — INSULIN LISPRO 100 [IU]/ML
0-4 INJECTION, SOLUTION INTRAVENOUS; SUBCUTANEOUS NIGHTLY
Status: DISCONTINUED | OUTPATIENT
Start: 2024-08-05 | End: 2024-08-08 | Stop reason: HOSPADM

## 2024-08-05 RX ORDER — DEXTROSE MONOHYDRATE 100 MG/ML
INJECTION, SOLUTION INTRAVENOUS CONTINUOUS PRN
Status: DISCONTINUED | OUTPATIENT
Start: 2024-08-05 | End: 2024-08-08 | Stop reason: HOSPADM

## 2024-08-05 RX ORDER — INSULIN LISPRO 100 [IU]/ML
0-8 INJECTION, SOLUTION INTRAVENOUS; SUBCUTANEOUS
Status: DISCONTINUED | OUTPATIENT
Start: 2024-08-06 | End: 2024-08-08 | Stop reason: HOSPADM

## 2024-08-05 RX ORDER — GLUCAGON 1 MG/ML
1 KIT INJECTION PRN
Status: DISCONTINUED | OUTPATIENT
Start: 2024-08-05 | End: 2024-08-08 | Stop reason: HOSPADM

## 2024-08-05 RX ORDER — ATORVASTATIN CALCIUM 40 MG/1
40 TABLET, FILM COATED ORAL DAILY
Status: DISCONTINUED | OUTPATIENT
Start: 2024-08-06 | End: 2024-08-08 | Stop reason: HOSPADM

## 2024-08-05 RX ORDER — CLOPIDOGREL BISULFATE 75 MG/1
75 TABLET ORAL DAILY
Status: DISCONTINUED | OUTPATIENT
Start: 2024-08-06 | End: 2024-08-08 | Stop reason: HOSPADM

## 2024-08-05 RX ORDER — EZETIMIBE 10 MG/1
10 TABLET ORAL DAILY
Status: DISCONTINUED | OUTPATIENT
Start: 2024-08-06 | End: 2024-08-08 | Stop reason: HOSPADM

## 2024-08-05 RX ORDER — TROSPIUM CHLORIDE 20 MG/1
20 TABLET, FILM COATED ORAL
Status: DISCONTINUED | OUTPATIENT
Start: 2024-08-06 | End: 2024-08-08 | Stop reason: HOSPADM

## 2024-08-05 RX ORDER — SODIUM CHLORIDE 9 MG/ML
INJECTION, SOLUTION INTRAVENOUS PRN
Status: DISCONTINUED | OUTPATIENT
Start: 2024-08-05 | End: 2024-08-08 | Stop reason: HOSPADM

## 2024-08-05 RX ORDER — ASPIRIN 81 MG/1
81 TABLET ORAL DAILY
Status: DISCONTINUED | OUTPATIENT
Start: 2024-08-06 | End: 2024-08-08 | Stop reason: HOSPADM

## 2024-08-05 RX ORDER — LEVOTHYROXINE SODIUM 0.05 MG/1
100 TABLET ORAL
Status: DISCONTINUED | OUTPATIENT
Start: 2024-08-06 | End: 2024-08-08 | Stop reason: HOSPADM

## 2024-08-05 RX ADMIN — CEFTRIAXONE SODIUM 1000 MG: 1 INJECTION, POWDER, FOR SOLUTION INTRAMUSCULAR; INTRAVENOUS at 15:52

## 2024-08-05 RX ADMIN — BUSPIRONE HYDROCHLORIDE 5 MG: 5 TABLET ORAL at 21:58

## 2024-08-05 RX ADMIN — APIXABAN 5 MG: 5 TABLET, FILM COATED ORAL at 21:58

## 2024-08-05 RX ADMIN — ACETAMINOPHEN 650 MG: 325 TABLET ORAL at 22:11

## 2024-08-05 RX ADMIN — CEFEPIME 1000 MG: 1 INJECTION, POWDER, FOR SOLUTION INTRAMUSCULAR; INTRAVENOUS at 23:18

## 2024-08-05 RX ADMIN — Medication 5 MCG/MIN: at 14:16

## 2024-08-05 RX ADMIN — SODIUM CHLORIDE 1000 ML: 9 INJECTION, SOLUTION INTRAVENOUS at 14:18

## 2024-08-05 RX ADMIN — SODIUM CHLORIDE, PRESERVATIVE FREE 10 ML: 5 INJECTION INTRAVENOUS at 21:26

## 2024-08-05 RX ADMIN — SODIUM CHLORIDE 1000 ML: 9 INJECTION, SOLUTION INTRAVENOUS at 13:41

## 2024-08-05 RX ADMIN — SODIUM CHLORIDE: 9 INJECTION, SOLUTION INTRAVENOUS at 21:27

## 2024-08-05 RX ADMIN — VANCOMYCIN HYDROCHLORIDE 1000 MG: 1 INJECTION, POWDER, LYOPHILIZED, FOR SOLUTION INTRAVENOUS at 22:03

## 2024-08-05 RX ADMIN — KETOROLAC TROMETHAMINE 15 MG: 15 INJECTION, SOLUTION INTRAMUSCULAR; INTRAVENOUS at 13:42

## 2024-08-05 RX ADMIN — RIVASTIGMINE TARTRATE 3 MG: 1.5 CAPSULE ORAL at 21:58

## 2024-08-05 RX ADMIN — SODIUM CHLORIDE: 9 INJECTION, SOLUTION INTRAVENOUS at 22:02

## 2024-08-05 ASSESSMENT — PAIN - FUNCTIONAL ASSESSMENT: PAIN_FUNCTIONAL_ASSESSMENT: NONE - DENIES PAIN

## 2024-08-05 ASSESSMENT — PAIN DESCRIPTION - ORIENTATION: ORIENTATION: LEFT

## 2024-08-05 ASSESSMENT — PAIN DESCRIPTION - LOCATION: LOCATION: ARM

## 2024-08-05 NOTE — ED PROVIDER NOTES
[facility]  EMERGENCY DEPARTMENT ENCOUNTER      Pt Name: Eliz Macias  MRN: [id number]  Birthdate 1935  Date of evaluation: 8/5/2024      CHIEF COMPLAINT       Chief Complaint   Patient presents with    Wrist Injury     Left wrist pain and deformity, unknown how pt injuried         HISTORY OF PRESENT ILLNESS      The patient was brought to emergency department by ambulance, accompanied by her son for pain in the left wrist.  The patient had a right hip replacement surgery about 3 weeks ago.  According to her son, she does not get out of bed without them.  They do not think she fell, but she has a painful left wrist.  They just noted this today.  The patient does not speak English.  She does have some dementia.  They have not noted any mental status change of significance.  She has not been vomiting.  She does complain that she is thirsty.     REVIEW OF SYSTEMS        Left wrist injury as noted in HPI.  History of right hip replacement.  Patient does not ambulate without assistance according to family.  History of diabetes.  History of hypertension and Parkinson's disease.     PAST MEDICAL HISTORY    has a past medical history of CAD (coronary artery disease), Dementia (HCC), Diabetes mellitus (HCC), Fall, Hyperlipidemia, Hypertension, and Parkinsonian features.    SURGICAL HISTORY      has a past surgical history that includes hip surgery (Right, 07/05/2024) and hip surgery (Right, 7/5/2024).    CURRENT MEDICATIONS       Previous Medications    ALENDRONATE (FOSAMAX) 70 MG TABLET    Take 1 tablet by mouth once a week    APIXABAN (ELIQUIS) 5 MG TABS TABLET    Take 1 tablet by mouth 2 times daily    ASPIRIN 81 MG EC TABLET    Take 1 tablet by mouth daily    ATORVASTATIN (LIPITOR) 40 MG TABLET    Take 1 tablet by mouth daily    BUSPIRONE (BUSPAR) 5 MG TABLET    Take 1 tablet by mouth 2 times daily    CARBIDOPA-LEVODOPA (SINEMET CR)  MG PER EXTENDED RELEASE TABLET    Take  tablets by  The extrathoracic soft tissues are unremarkable.                    LABS:  Results for orders placed or performed during the hospital encounter of 08/05/24   CBC with Auto Differential   Result Value Ref Range    WBC 11.0 3.5 - 11.0 k/uL    RBC 4.55 4.0 - 5.2 m/uL    Hemoglobin 12.5 12.0 - 16.0 g/dL    Hematocrit 38.2 36 - 46 %    MCV 83.9 80 - 100 fL    MCH 27.6 26 - 34 pg    MCHC 32.8 31 - 37 g/dL    RDW 18.5 (H) 12.5 - 15.4 %    Platelets 318 140 - 450 k/uL    MPV 8.5 6.0 - 12.0 fL    Neutrophils % 84 (H) 36 - 66 %    Lymphocytes % 6 (L) 24 - 44 %    Monocytes % 8 2 - 11 %    Eosinophils % 1 1 - 4 %    Basophils % 1 0 - 2 %    Neutrophils Absolute 9.40 (H) 1.8 - 7.7 k/uL    Lymphocytes Absolute 0.60 (L) 1.0 - 4.8 k/uL    Monocytes Absolute 0.80 0.1 - 1.2 k/uL    Eosinophils Absolute 0.10 0.0 - 0.4 k/uL    Basophils Absolute 0.10 0.0 - 0.2 k/uL   Basic Metabolic Panel   Result Value Ref Range    Sodium 139 135 - 144 mmol/L    Potassium 4.2 3.7 - 5.3 mmol/L    Chloride 102 98 - 107 mmol/L    CO2 24 20 - 31 mmol/L    Anion Gap 13 9 - 17 mmol/L    Glucose 194 (H) 70 - 99 mg/dL    BUN 19 8 - 23 mg/dL    Creatinine 0.7 0.5 - 0.9 mg/dL    Est, Glom Filt Rate 84 >60 mL/min/1.73m2    Calcium 9.4 8.6 - 10.4 mg/dL   Troponin   Result Value Ref Range    Troponin, High Sensitivity 31 (H) 0 - 14 ng/L   Urinalysis with Reflex to Culture    Specimen: Urine, clean catch   Result Value Ref Range    Color, UA Yellow Yellow    Turbidity UA Clear Clear    Glucose, Ur 3+ (A) NEGATIVE mg/dL    Bilirubin, Urine NEGATIVE NEGATIVE    Ketones, Urine NEGATIVE NEGATIVE mg/dL    Specific Gravity, UA 1.015 1.005 - 1.030    Urine Hgb NEGATIVE NEGATIVE    pH, Urine 6.0 5.0 - 8.0    Protein, UA NEGATIVE NEGATIVE mg/dL    Urobilinogen, Urine Normal 0.0 - 1.0 EU/dL    Nitrite, Urine NEGATIVE NEGATIVE    Leukocyte Esterase, Urine NEGATIVE NEGATIVE    Comment       Microscopic exam not performed based on chemical results unless requested in original

## 2024-08-05 NOTE — H&P
[facility]  Office: [phone]  Eugenio Davis DO, Sathish Michelle DO, Chito Duke DO, Tommie Camacho DO, Sanjay Koroma MD, Summer Salazar MD, Luis Enrique Beckman MD, Dhara Morales MD,  Jourdan Moore MD, Eris Boyle MD, Karlo Gibbs MD,  Rhys Das DO, Álvaro Couch MD, Carlos Bender MD, Luis Davis DO, Julia Fitzpatrick MD,  Lincoln Chamberlain DO, Argenis Lopez MD, Eleanor Johnson MD, Lara Murcia MD, Alise Newell MD,  Jesse Henderson MD, Torito Stacy MD, Celine Woo MD, Jerrica Rosales MD, Varinder Alves MD, Jade Can MD, Harrison Rodriguez DO, Matt Beatty DO, Cristina Figueroa MD,  Fransisco Ramirez MD, Shirley Waterhouse, CNP,  Mary Beth Tomas CNP, Alok Zimmerman, CNP,  Shanelle Oquendo, DNP, Gina Torrez, CNP, Steffany Hillman, CNP, Natty Guerra, CNP, Mayra Sandoval, CNP, Elizabeth Durand, PA-C, Darline Khan PA-C, Jojo Figueredo, CNP, Natalya Beckwith, CNP, Quentin Lynne, CNP, Dominique Barton, CNP, Yomaira Curran, CNP, Nereyda Will, CNS, Briana Jane, CNP, Shyann Linton CNP, Tracy Schwab, CNP         [facility]   IN-PATIENT SERVICE   [facility]    HISTORY AND PHYSICAL EXAMINATION            Date:   8/5/2024  Patient name:  Eliz Macias  Date of admission:  8/5/2024 12:21 PM  MRN:   [id number]  Account:  [id number]  YOB: 1935  PCP:    Lee Sainz MD  [facility]:   [facility]  Code Status:    Prior      History Obtained From:     electronic medical record    History of Present Illness:     Eliz Macias is a 88 y.o. Non- / non  female who presents with Wrist Injury (Left wrist pain and deformity, unknown how pt injuried)   and is admitted to the hospital for the management of Shock (HCC).    This 88-year-old female was brought by ambulance after family noted she was having significant left wrist pain.  The patient does not have a known injury to the wrist.  In the emergency room she did undergo imaging study which is unremarkable for  sounds, no hepatomegaly or splenomegaly  Neurologic: There are no new focal motor or sensory deficits, normal muscle tone and bulk, no abnormal sensation, normal speech, cranial nerves II through XII grossly intact  Skin: Incision site of the right hip is clean and dry, no evidence of acute infection, left wrist demonstrates bruising but no cellulitic changes  Extremities:  peripheral pulses palpable, no pedal edema or calf pain with palpation  Psych: Unable to assess    Investigations:      Laboratory Testing:  Recent Results (from the past 24 hour(s))   EKG 12 Lead    Collection Time: 08/05/24  1:24 PM   Result Value Ref Range    Ventricular Rate 106 BPM    Atrial Rate 106 BPM    P-R Interval 164 ms    QRS Duration 78 ms    Q-T Interval 362 ms    QTc Calculation (Bazett) 480 ms    P Axis 62 degrees    R Axis 104 degrees    T Axis -4 degrees   CBC with Auto Differential    Collection Time: 08/05/24  1:40 PM   Result Value Ref Range    WBC 11.0 3.5 - 11.0 k/uL    RBC 4.55 4.0 - 5.2 m/uL    Hemoglobin 12.5 12.0 - 16.0 g/dL    Hematocrit 38.2 36 - 46 %    MCV 83.9 80 - 100 fL    MCH 27.6 26 - 34 pg    MCHC 32.8 31 - 37 g/dL    RDW 18.5 (H) 12.5 - 15.4 %    Platelets 318 140 - 450 k/uL    MPV 8.5 6.0 - 12.0 fL    Neutrophils % 84 (H) 36 - 66 %    Lymphocytes % 6 (L) 24 - 44 %    Monocytes % 8 2 - 11 %    Eosinophils % 1 1 - 4 %    Basophils % 1 0 - 2 %    Neutrophils Absolute 9.40 (H) 1.8 - 7.7 k/uL    Lymphocytes Absolute 0.60 (L) 1.0 - 4.8 k/uL    Monocytes Absolute 0.80 0.1 - 1.2 k/uL    Eosinophils Absolute 0.10 0.0 - 0.4 k/uL    Basophils Absolute 0.10 0.0 - 0.2 k/uL   Basic Metabolic Panel    Collection Time: 08/05/24  1:40 PM   Result Value Ref Range    Sodium 139 135 - 144 mmol/L    Potassium 4.2 3.7 - 5.3 mmol/L    Chloride 102 98 - 107 mmol/L    CO2 24 20 - 31 mmol/L    Anion Gap 13 9 - 17 mmol/L    Glucose 194 (H) 70 - 99 mg/dL    BUN 19 8 - 23 mg/dL    Creatinine 0.7 0.5 - 0.9 mg/dL    Est, Glom Filt Rate

## 2024-08-05 NOTE — PROGRESS NOTES
SPIRITUAL CARE DEPARTMENT [facility]  PROGRESS NOTE    Room # ER07/ER07   Name: Eliz Macias            Mormon: Non Demo    Reason for visit: Routine    I visited the patient.    Admit Date & Time: 8/5/2024 12:21 PM    Assessment:  Eliz Macias is a 88 y.o. female in the hospital because Shock. Upon entering the room Pt was resting . Family member was present. And open to conversation      Intervention:  I introduced myself and my title as  I offered space for Family member  to express feelings, needs, and concerns and provided a ministry presence.  provided pastoral care and support for family.    Outcome:  Family member was appreciate of  visit.    Plan:  Chaplains will remain available to offer spiritual and emotional support as needed.    Electronically signed by Chaplain CHARLY, on 8/5/2024 at 7:37 PM.  Spiritual Care Department  [facility]      08/05/24 1935   Encounter Summary   Encounter Overview/Reason Initial Encounter   Service Provided For Patient   Support System Children   Last Encounter  08/05/24   Complexity of Encounter Moderate   Begin Time 1345   End Time  1400   Total Time Calculated 15 min   Spiritual/Emotional needs   Type Emotional Distress;Spiritual Distress   Grief, Loss, and Adjustments   Type Life Adjustments;Adjustment to illness   Assessment/Intervention/Outcome   Assessment Calm;Coping   Intervention Active listening;Sustaining Presence/Ministry of presence   Outcome Comfort;Coping   Plan and Referrals   Plan/Referrals Continue to visit, (comment)

## 2024-08-06 PROBLEM — I95.9 HYPOTENSION: Status: ACTIVE | Noted: 2024-08-06

## 2024-08-06 LAB
ANION GAP SERPL CALCULATED.3IONS-SCNC: 11 MMOL/L (ref 9–17)
BASOPHILS # BLD: 0.1 K/UL (ref 0–0.2)
BASOPHILS NFR BLD: 1 % (ref 0–2)
BUN SERPL-MCNC: 15 MG/DL (ref 8–23)
CA-I BLD-SCNC: 1.16 MMOL/L (ref 1.13–1.33)
CALCIUM SERPL-MCNC: 8 MG/DL (ref 8.6–10.4)
CHLORIDE SERPL-SCNC: 108 MMOL/L (ref 98–107)
CO2 SERPL-SCNC: 19 MMOL/L (ref 20–31)
CREAT SERPL-MCNC: 0.6 MG/DL (ref 0.5–0.9)
CRP SERPL HS-MCNC: 137.4 MG/L (ref 0–5)
EKG ATRIAL RATE: 106 BPM
EKG P AXIS: 62 DEGREES
EKG P-R INTERVAL: 164 MS
EKG Q-T INTERVAL: 362 MS
EKG QRS DURATION: 78 MS
EKG QTC CALCULATION (BAZETT): 480 MS
EKG R AXIS: 104 DEGREES
EKG T AXIS: -4 DEGREES
EKG VENTRICULAR RATE: 106 BPM
EOSINOPHIL # BLD: 0.2 K/UL (ref 0–0.4)
EOSINOPHILS RELATIVE PERCENT: 2 % (ref 1–4)
ERYTHROCYTE [DISTWIDTH] IN BLOOD BY AUTOMATED COUNT: 18.5 % (ref 12.5–15.4)
ERYTHROCYTE [SEDIMENTATION RATE] IN BLOOD BY PHOTOMETRIC METHOD: 53 MM/HR (ref 0–30)
GFR, ESTIMATED: 87 ML/MIN/1.73M2
GLUCOSE BLD-MCNC: 111 MG/DL (ref 65–105)
GLUCOSE BLD-MCNC: 148 MG/DL (ref 65–105)
GLUCOSE BLD-MCNC: 158 MG/DL (ref 65–105)
GLUCOSE BLD-MCNC: 171 MG/DL (ref 65–105)
GLUCOSE SERPL-MCNC: 127 MG/DL (ref 70–99)
HCT VFR BLD AUTO: 33.5 % (ref 36–46)
HGB BLD-MCNC: 10.9 G/DL (ref 12–16)
LYMPHOCYTES NFR BLD: 0.6 K/UL (ref 1–4.8)
LYMPHOCYTES RELATIVE PERCENT: 6 % (ref 24–44)
MCH RBC QN AUTO: 27.6 PG (ref 26–34)
MCHC RBC AUTO-ENTMCNC: 32.6 G/DL (ref 31–37)
MCV RBC AUTO: 84.7 FL (ref 80–100)
MONOCYTES NFR BLD: 0.6 K/UL (ref 0.1–1.2)
MONOCYTES NFR BLD: 7 % (ref 2–11)
NEUTROPHILS NFR BLD: 84 % (ref 36–66)
NEUTS SEG NFR BLD: 8.2 K/UL (ref 1.8–7.7)
PLATELET # BLD AUTO: 293 K/UL (ref 140–450)
PMV BLD AUTO: 8.5 FL (ref 6–12)
POTASSIUM SERPL-SCNC: 3.7 MMOL/L (ref 3.7–5.3)
PROCALCITONIN SERPL-MCNC: 0.31 NG/ML (ref 0–0.09)
RBC # BLD AUTO: 3.96 M/UL (ref 4–5.2)
SODIUM SERPL-SCNC: 138 MMOL/L (ref 135–144)
WBC OTHER # BLD: 9.7 K/UL (ref 3.5–11)

## 2024-08-06 PROCEDURE — 6370000000 HC RX 637 (ALT 250 FOR IP): Performed by: HOSPITALIST

## 2024-08-06 PROCEDURE — 99223 1ST HOSP IP/OBS HIGH 75: CPT | Performed by: INTERNAL MEDICINE

## 2024-08-06 PROCEDURE — 86140 C-REACTIVE PROTEIN: CPT

## 2024-08-06 PROCEDURE — 82947 ASSAY GLUCOSE BLOOD QUANT: CPT

## 2024-08-06 PROCEDURE — 2580000003 HC RX 258: Performed by: HOSPITALIST

## 2024-08-06 PROCEDURE — 85025 COMPLETE CBC W/AUTO DIFF WBC: CPT

## 2024-08-06 PROCEDURE — 6360000002 HC RX W HCPCS: Performed by: HOSPITALIST

## 2024-08-06 PROCEDURE — 2580000003 HC RX 258: Performed by: STUDENT IN AN ORGANIZED HEALTH CARE EDUCATION/TRAINING PROGRAM

## 2024-08-06 PROCEDURE — 80048 BASIC METABOLIC PNL TOTAL CA: CPT

## 2024-08-06 PROCEDURE — 85652 RBC SED RATE AUTOMATED: CPT

## 2024-08-06 PROCEDURE — 6370000000 HC RX 637 (ALT 250 FOR IP): Performed by: STUDENT IN AN ORGANIZED HEALTH CARE EDUCATION/TRAINING PROGRAM

## 2024-08-06 PROCEDURE — 82330 ASSAY OF CALCIUM: CPT

## 2024-08-06 PROCEDURE — 36415 COLL VENOUS BLD VENIPUNCTURE: CPT

## 2024-08-06 PROCEDURE — 6360000002 HC RX W HCPCS: Performed by: STUDENT IN AN ORGANIZED HEALTH CARE EDUCATION/TRAINING PROGRAM

## 2024-08-06 PROCEDURE — 99232 SBSQ HOSP IP/OBS MODERATE 35: CPT | Performed by: STUDENT IN AN ORGANIZED HEALTH CARE EDUCATION/TRAINING PROGRAM

## 2024-08-06 PROCEDURE — 2000000000 HC ICU R&B

## 2024-08-06 PROCEDURE — 84145 PROCALCITONIN (PCT): CPT

## 2024-08-06 RX ORDER — MIDODRINE HYDROCHLORIDE 5 MG/1
5 TABLET ORAL
Status: DISCONTINUED | OUTPATIENT
Start: 2024-08-06 | End: 2024-08-07

## 2024-08-06 RX ADMIN — BUSPIRONE HYDROCHLORIDE 5 MG: 5 TABLET ORAL at 09:02

## 2024-08-06 RX ADMIN — ACETAMINOPHEN 650 MG: 325 TABLET ORAL at 20:27

## 2024-08-06 RX ADMIN — APIXABAN 5 MG: 5 TABLET, FILM COATED ORAL at 09:01

## 2024-08-06 RX ADMIN — BUSPIRONE HYDROCHLORIDE 5 MG: 5 TABLET ORAL at 20:28

## 2024-08-06 RX ADMIN — SODIUM CHLORIDE, PRESERVATIVE FREE 10 ML: 5 INJECTION INTRAVENOUS at 09:02

## 2024-08-06 RX ADMIN — CEFEPIME 1000 MG: 1 INJECTION, POWDER, FOR SOLUTION INTRAMUSCULAR; INTRAVENOUS at 23:35

## 2024-08-06 RX ADMIN — RIVASTIGMINE TARTRATE 3 MG: 1.5 CAPSULE ORAL at 09:01

## 2024-08-06 RX ADMIN — LEVOTHYROXINE SODIUM 100 MCG: 50 TABLET ORAL at 06:15

## 2024-08-06 RX ADMIN — TROSPIUM CHLORIDE 20 MG: 20 TABLET, FILM COATED ORAL at 16:34

## 2024-08-06 RX ADMIN — EZETIMIBE 10 MG: 10 TABLET ORAL at 09:02

## 2024-08-06 RX ADMIN — SODIUM CHLORIDE, PRESERVATIVE FREE 10 ML: 5 INJECTION INTRAVENOUS at 20:28

## 2024-08-06 RX ADMIN — ACETAMINOPHEN 650 MG: 325 TABLET ORAL at 09:01

## 2024-08-06 RX ADMIN — SODIUM CHLORIDE: 9 INJECTION, SOLUTION INTRAVENOUS at 16:26

## 2024-08-06 RX ADMIN — SERTRALINE HYDROCHLORIDE 50 MG: 50 TABLET ORAL at 09:02

## 2024-08-06 RX ADMIN — RIVASTIGMINE TARTRATE 3 MG: 1.5 CAPSULE ORAL at 20:28

## 2024-08-06 RX ADMIN — ASPIRIN 81 MG: 81 TABLET, COATED ORAL at 09:03

## 2024-08-06 RX ADMIN — CEFEPIME 1000 MG: 1 INJECTION, POWDER, FOR SOLUTION INTRAMUSCULAR; INTRAVENOUS at 11:17

## 2024-08-06 RX ADMIN — TROSPIUM CHLORIDE 20 MG: 20 TABLET, FILM COATED ORAL at 06:15

## 2024-08-06 RX ADMIN — ATORVASTATIN CALCIUM 40 MG: 40 TABLET, FILM COATED ORAL at 08:58

## 2024-08-06 RX ADMIN — MIDODRINE HYDROCHLORIDE 5 MG: 5 TABLET ORAL at 13:05

## 2024-08-06 RX ADMIN — APIXABAN 5 MG: 5 TABLET, FILM COATED ORAL at 20:37

## 2024-08-06 RX ADMIN — DOCUSATE SODIUM 100 MG: 100 CAPSULE, LIQUID FILLED ORAL at 08:58

## 2024-08-06 RX ADMIN — VANCOMYCIN HYDROCHLORIDE 1000 MG: 1 INJECTION, POWDER, LYOPHILIZED, FOR SOLUTION INTRAVENOUS at 16:33

## 2024-08-06 RX ADMIN — CLOPIDOGREL BISULFATE 75 MG: 75 TABLET ORAL at 09:01

## 2024-08-06 ASSESSMENT — PAIN DESCRIPTION - LOCATION: LOCATION: GENERALIZED

## 2024-08-06 ASSESSMENT — PAIN - FUNCTIONAL ASSESSMENT: PAIN_FUNCTIONAL_ASSESSMENT: PREVENTS OR INTERFERES WITH ALL ACTIVE AND SOME PASSIVE ACTIVITIES

## 2024-08-06 ASSESSMENT — PAIN SCALES - GENERAL: PAINLEVEL_OUTOF10: 0

## 2024-08-06 NOTE — CONSULTS
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    Chief complaint/reason for consultation:   Hypotension with concern for shock    History of Present Illness:   Eliz Macias is a 88 y.o.-year-old female who was initially admitted on 8/5/2024.   Eliz has multiple medical problems including early onset Alzheimer's, coronary artery disease, depression/generalized anxiety disorder, essential hypertension, hyperlipidemia, diabetes mellitus type 2, atrial fibrillation, chronic diastolic heart failure, and was recently admitted after a fall which she underwent a right Franck arthroplasty of the hip.    The patient was at home with family and was otherwise doing well ambulating with a walker and it is my understanding that the family noticed that her left hand/wrist area was swollen and red yesterday morning and she was complaining of pain and for that reason they brought her into the emergency room as they thought maybe she had sustained some trauma in that area.    Workup in the emergency room did show that she was hypotensive and ended up being started on pressors.   TECHNIQUE: CT of the head was performed without the administration of intravenous contrast. Automated exposure control, iterative reconstruction, and/or weight based adjustment of the mA/kV was utilized to reduce the radiation dose to as low as reasonably achievable. COMPARISON: February 13, 2023 HISTORY: ORDERING SYSTEM PROVIDED HISTORY: altered mental status TECHNOLOGIST PROVIDED HISTORY: altered mental status Decision Support Exception - unselect if not a suspected or confirmed emergency medical condition->Emergency Medical Condition (MA) Reason for Exam: AMS FINDINGS: BRAIN/VENTRICLES: There is no acute intracranial hemorrhage, mass effect or midline shift.  No abnormal extra-axial fluid collection.  The gray-white differentiation is maintained without evidence of an acute infarct.  There is no evidence of hydrocephalus. Redemonstration of age-appropriate atrophy and small-vessel disease ischemic changes. ORBITS: The visualized portion of the orbits demonstrate no acute abnormality. SINUSES: The visualized paranasal sinuses and mastoid air cells demonstrate no acute abnormality. SOFT TISSUES/SKULL:  No acute abnormality of the visualized skull or soft tissues.     Study limited by motion artifact.  Within limitation, no acute intracranial abnormality.     XR CHEST PORTABLE    Result Date: 8/5/2024  EXAMINATION: ONE XRAY VIEW OF THE CHEST 8/5/2024 1:26 pm COMPARISON: Chest radiograph performed 07/08/2024. HISTORY: ORDERING SYSTEM PROVIDED HISTORY: chest pain TECHNOLOGIST PROVIDED HISTORY: chest pain Reason for Exam: chest pain FINDINGS: There is chronic pulmonary change.  There are bibasilar infiltrates.  There is no pneumothorax.  The mediastinal structures are unremarkable.  The upper abdomen is unremarkable.  The extrathoracic soft tissues are unremarkable.     Chronic pulmonary change with bibasilar infiltrates.     XR WRIST LEFT (MIN 3 VIEWS)    Result Date: 8/5/2024  EXAMINATION: 3 XRAY VIEWS OF THE LEFT

## 2024-08-06 NOTE — CARE COORDINATION
Case Management Assessment  Initial Evaluation    Date/Time of Evaluation: 8/6/2024 11:56 AM  Assessment Completed by: Lanny Kelsey RN    If patient is discharged prior to next notation, then this note serves as note for discharge by case management.    Patient Name: Eliz Macias                   YOB: 1935  Diagnosis: Shock (HCC) [R57.9]  Contusion of left wrist, initial encounter [S60.212A]  Hypotension, unspecified hypotension type [I95.9]                   Date / Time: 8/5/2024 12:21 PM    Patient Admission Status: Inpatient   Readmission Risk (Low < 19, Mod (19-27), High > 27): Readmission Risk Score: 21.9    Current PCP: Lee Sainz MD  PCP verified by CM? Yes    Chart Reviewed: Yes      History Provided by: Child/Family (son- Deepika Macias)  Patient Orientation: Unable to Assess    Patient Cognition: Other (see comment) (LAINA)    Hospitalization in the last 30 days (Readmission):  Yes    If yes, Readmission Assessment in CM Navigator will be completed.    Advance Directives:      Code Status: DNR-CCA   Patient's Primary Decision Maker is: Named in Scanned ACP Document    Primary Decision Maker: Deepika Macias - Child - [phone]    Discharge Planning:    Patient lives with: Family Members Type of Home: House  Primary Care Giver:    Patient Support Systems include: Children, Family Members   Current Financial resources: Medicare  Current community resources: ECF/Home Care  Current services prior to admission: Home Care, Durable Medical Equipment            Current DME: Hospital Bed, Walker, Wheelchair, Shower Chair            Type of Home Care services:  PT, OT, Nursing Services, Aide Services    ADLS  Prior functional level: Assistance with the following:, Bathing, Dressing, Toileting, Cooking, Housework, Shopping, Mobility  Current functional level: Other (see comment) (Await PT/OT eval)    PT AM-PAC:   /24  OT AM-PAC:   /24    Family can provide assistance at DC: Yes  Would you like Case

## 2024-08-06 NOTE — PLAN OF CARE
Problem: Safety - Adult  Goal: Free from fall injury  Outcome: Progressing  Flowsheets (Taken 8/5/2024 2247)  Free From Fall Injury: Instruct family/caregiver on patient safety     Problem: Discharge Planning  Goal: Discharge to home or other facility with appropriate resources  Outcome: Progressing  Flowsheets (Taken 8/5/2024 2247)  Discharge to home or other facility with appropriate resources: Identify barriers to discharge with patient and caregiver     Problem: Pain  Goal: Verbalizes/displays adequate comfort level or baseline comfort level  Outcome: Progressing  Flowsheets (Taken 8/5/2024 2247)  Verbalizes/displays adequate comfort level or baseline comfort level:   Encourage patient to monitor pain and request assistance   Assess pain using appropriate pain scale   Implement non-pharmacological measures as appropriate and evaluate response   Consider cultural and social influences on pain and pain management     Problem: Skin/Tissue Integrity  Goal: Absence of new skin breakdown  Description: 1.  Monitor for areas of redness and/or skin breakdown  2.  Assess vascular access sites hourly  3.  Every 4-6 hours minimum:  Change oxygen saturation probe site  4.  Every 4-6 hours:  If on nasal continuous positive airway pressure, respiratory therapy assess nares and determine need for appliance change or resting period.  Outcome: Progressing  Note: Assess areas of redness     Problem: Cardiovascular - Adult  Goal: Maintains optimal cardiac output and hemodynamic stability  Outcome: Progressing  Flowsheets (Taken 8/5/2024 2247)  Maintains optimal cardiac output and hemodynamic stability:   Monitor blood pressure and heart rate   Assess for signs of decreased cardiac output   Administer fluid and/or volume expanders as ordered   Administer vasoactive medications as ordered     Problem: Skin/Tissue Integrity - Adult  Goal: Skin integrity remains intact  Outcome: Progressing  Flowsheets (Taken 8/5/2024 2247)  Skin

## 2024-08-06 NOTE — ACP (ADVANCE CARE PLANNING)
Advance Care Planning     Advance Care Planning Activator (Inpatient)  Conversation Note      Date of ACP Conversation: 8/6/2024     Conversation Conducted with: Healthcare Decision Maker    ACP Activator: Lanny Kelsey RN    Health Care Decision Maker:  Deepika Macias    Current Designated Health Care Decision Maker:     Primary Decision Maker: Deepika Macias - Child - [phone]    Today we documented Decision Maker(s) consistent with ACP documents on file.    Care Preferences    Ventilation:  \"If you were in your present state of health and suddenly became very ill and were unable to breathe on your own, what would your preference be about the use of a ventilator (breathing machine) if it were available to you?\"      Would the patient desire the use of ventilator (breathing machine)?: no    \"If your health worsens and it becomes clear that your chance of recovery is unlikely, what would your preference be about the use of a ventilator (breathing machine) if it were available to you?\"     Would the patient desire the use of ventilator (breathing machine)?: No      Resuscitation  \"CPR works best to restart the heart when there is a sudden event, like a heart attack, in someone who is otherwise healthy. Unfortunately, CPR does not typically restart the heart for people who have serious health conditions or who are very sick.\"    \"In the event your heart stopped as a result of an underlying serious health condition, would you want attempts to be made to restart your heart (answer \"yes\" for attempt to resuscitate) or would you prefer a natural death (answer \"no\" for do not attempt to resuscitate)?\" no         Conversation Outcomes:  ACP discussion completed

## 2024-08-06 NOTE — PROGRESS NOTES
[facility]   Pharmacy Pharmacokinetic Monitoring Service - Vancomycin     Eliz Macias is a 88 y.o. female starting on vancomycin therapy for sepsis. Pharmacy consulted by     Luis Davis    for monitoring and adjustment.    Target Concentration: Goal AUC/EDEN 400-600 mg*hr/L    Additional Antimicrobials: cefepime    Pertinent Laboratory Values:   Wt Readings from Last 1 Encounters:   08/05/24 75 kg (165 lb 5.5 oz)     Temp Readings from Last 1 Encounters:   08/05/24 98.1 °F (36.7 °C)     Estimated Creatinine Clearance: 57 mL/min (based on SCr of 0.7 mg/dL).  Recent Labs     08/05/24  1340   CREATININE 0.7   BUN 19   WBC 11.0     Procalcitonin:     Pertinent Cultures:  Culture Date Source Results        MRSA Nasal Swab: N/A. Non-respiratory infection.    Plan:  Dosing recommendations based on Bayesian software  Start vancomycin 1000mg q18  Anticipated AUC of 450 and trough concentration of 12.2 at steady state  Renal labs as indicated   Pharmacy will continue to monitor patient and adjust therapy as indicated    Thank you for the consult,  All Garcia [facility]  8/5/2024 9:29 PM

## 2024-08-06 NOTE — CARE COORDINATION
08/06/24 1146   Readmission Assessment   Number of Days since last admission? 8-30 days   Previous Disposition SNF   Who is being Interviewed Caregiver  (son- Deepika Macias)   What was the patient's/caregiver's perception as to why they think they needed to return back to the hospital? Other (Comment)  (wrist pain)   Did you visit your Primary Care Physician after you left the hospital, before you returned this time? No   Why weren't you able to visit your PCP? Did not have an appointment   Did you see a specialist, such as Cardiac, Pulmonary, Orthopedic Physician, etc. after you left the hospital? Yes   Who advised the patient to return to the hospital? Caregiver   Does the patient report anything that got in the way of taking their medications? No   In our efforts to provide the best possible care to you and others like you, can you think of anything that we could have done to help you after you left the hospital the first time, so that you might not have needed to return so soon? Identify patient's health literacy needs;Discharge instructions that are concise, clear, and non contradictory;Improved written discharge instructions;Teaching during hospitalization regarding your illness;Teach back instructions regarding management of illness

## 2024-08-06 NOTE — PLAN OF CARE
Problem: Safety - Adult  Goal: Free from fall injury  Outcome: Progressing     Problem: Discharge Planning  Goal: Discharge to home or other facility with appropriate resources  Outcome: Progressing  Flowsheets (Taken 8/6/2024 0800)  Discharge to home or other facility with appropriate resources: Identify barriers to discharge with patient and caregiver     Problem: Pain  Goal: Verbalizes/displays adequate comfort level or baseline comfort level  Outcome: Progressing     Problem: Skin/Tissue Integrity  Goal: Absence of new skin breakdown  Description: 1.  Monitor for areas of redness and/or skin breakdown  2.  Assess vascular access sites hourly  3.  Every 4-6 hours minimum:  Change oxygen saturation probe site  4.  Every 4-6 hours:  If on nasal continuous positive airway pressure, respiratory therapy assess nares and determine need for appliance change or resting period.  Outcome: Progressing     Problem: ABCDS Injury Assessment  Goal: Absence of physical injury  Outcome: Progressing     Problem: Cardiovascular - Adult  Goal: Maintains optimal cardiac output and hemodynamic stability  Outcome: Progressing  Flowsheets (Taken 8/6/2024 0800)  Maintains optimal cardiac output and hemodynamic stability: Monitor blood pressure and heart rate     Problem: Skin/Tissue Integrity - Adult  Goal: Skin integrity remains intact  Outcome: Progressing  Flowsheets (Taken 8/6/2024 0800)  Skin Integrity Remains Intact: Monitor for areas of redness and/or skin breakdown     Problem: Musculoskeletal - Adult  Goal: Return mobility to safest level of function  Outcome: Progressing  Flowsheets (Taken 8/6/2024 0800)  Return Mobility to Safest Level of Function: Assess patient stability and activity tolerance for standing, transferring and ambulating with or without assistive devices     Problem: Genitourinary - Adult  Goal: Urinary catheter remains patent  Outcome: Progressing  Flowsheets (Taken 8/6/2024 0800)  Urinary catheter remains

## 2024-08-06 NOTE — PROGRESS NOTES
Updated Dr. Chamberlain via secure message of pt condition noting patients most recent blood pressure. Asked if he would like parameters for the midodrine. Response was to hold midodrine for systolic blood pressure greater than 110.

## 2024-08-06 NOTE — PROGRESS NOTES
Patient son, Deepika, at bedside. He states he is healthcare POA and documents should be in [employer] system. He shared patient is lactose intolerant and does not eat pork. He also shared she is able to state her name, but does not know her birth date. Patient does wear upper dentures; however, they are at home.     Writer asked if patient has hearing deficit as she did not respond to many questions asked utilizing [employer] language services. Deepika stated she does not have problems hearing, \"She will likely not respond to the , but you can use it if you want\".     Writer noted patient did speak to son when spoken to.     Patient denies pain at this time.

## 2024-08-06 NOTE — PROGRESS NOTES
Updated Dr. Chamberlain via secure message of findings after speaking with patient son, Celina.     See new orders.

## 2024-08-06 NOTE — CONSULTS
PULMONARY & CRITICAL CARE MEDICINE CONSULT NOTE     Patient:  Eliz Macias  MRN: [id number]  Admit date: 8/5/2024  Primary Care Physician: Lee Sainz MD  CODE Status: DNR-CCA  LOS: 1  Inpatient consult to Pulmonology  Consult performed by: Akil Jenkins MD  Consult ordered by: Lincoln Chamberlain DO         SUBJECTIVE     CHIEF COMPLAINT/REASON FOR CONSULT: Wrist Injury (Left wrist pain and deformity, unknown how pt injuried)    HISTORY OF PRESENT ILLNESS:  The patient is a 88 y.o. female with past medical history of hypertension, hyperlipidemia, dementia was brought to the hospital for concerns for wrist pain.  She was hypotensive on presentation.  She had recent hospitalization at [facility] for right hip surgery and required Levophed postoperatively.  Lab evaluation was unremarkable.  Patient required Levophed to maintain blood pressure, which was weaned off this morning.  Patient unable to provide much history secondary to dementia and language barrier.  She is awake and alert and looks comfortable.  She is on empiric vancomycin and cefepime.    PAST MEDICAL HISTORY:        Diagnosis Date    CAD (coronary artery disease)     Dementia (HCC)     Diabetes mellitus (HCC)     Fall 09/23/2023    Hyperlipidemia     Hypertension     Parkinsonian features      PAST SURGICAL HISTORY:        Procedure Laterality Date    HIP SURGERY Right 07/05/2024    RIGHT HIP HEMIARTHROPLASTY - Right    HIP SURGERY Right 7/5/2024    RIGHT HIP HEMIARTHROPLASTY performed by Ramez Marvin DO at [facility] OR     FAMILY HISTORY:   No family history on file.  SOCIAL HISTORY:   TOBACCO: reports that she has never smoked. She has never used smokeless tobacco.  ETOH: Alcohol use questions deferred to the physician.  DRUGS:Drug use questions deferred to the physician.    ALLERGIES:    No Known Allergies      HOME MEDICATIONS:  Prior to Admission medications    Medication Sig Start Date End Date Taking? Authorizing Provider   apixaban  even/negative fluid balance  Stress ulcer prophylaxis  Physical/occupational therapy    It was my pleasure to evaluate Eliz Macias today. I would like to thank you for allowing me to participate in the care of this patient.  Please feel free to call with any further questions or concerns. We will continue to follow.     Akil Jenkins MD  Pulmonary and Critical Care Medicine           8/6/2024, 5:47 PM           This note is created with the assistance of a speech recognition program.  While intending to generate a document that actually reflects the content of the visit, the document can still have some errors including those of syntax and sound-alike substitutions which may escape proof reading.  It such instances, actual meaning can be extrapolated by contextual diversion.

## 2024-08-06 NOTE — PROGRESS NOTES
[facility]  Office: [phone]  Eugenio Davis DO, Sathish Michelle DO, Chito Duke DO, Tommie Camacho DO, Sanjay Koroma MD, Summer Salazar MD, Luis Enrique Beckman MD, Dhara Morales MD,  Jourdan Moore MD, Eris Boyle MD, Karlo Gibbs MD,  Rhys Das DO, Álvaro Couch MD, Carlos Bender MD, Luis Davis DO, Julia Fitzpatrick MD,  Lincoln Chamberlain DO, Argenis Lopez MD, Eleanor Johnson MD, Lara Murcia MD, Alise Newell MD,  Jesse Henderson MD, Torito Stacy MD, Celine Woo MD, Jerrica Rosales MD, Varinder Alves MD, Jade Can MD, Harrison Rodriguez DO, Matt Baetty DO, Cristina Figueroa MD,  Fransisco Ramirez MD, Shirley Waterhouse, CNP,  Mary Beth Tomas CNP, Alok Zimmerman, CNP,  Shanelle Oquendo, DNP, Gina Torrez, CNP, Steffany Hillman, CNP, Natty Guerra, CNP, Mayra Sandoval, CNP, Elizabeth Durand, PA-C, Darline Khan PA-C, Jojo Figueredo, CNP, Natalya Beckwith, CNP, Quentin Lynne, CNP, Dominique Barton, CNP, Yomaira Curran, CNP, Nereyda Will, CNS, Briana Jane, CNP, Shyann Linton CNP, Tracy Schwab, CNP         [facility]   IN-PATIENT SERVICE   [facility]    Progress Note    8/6/2024    9:45 AM    Name:   Eliz Macias  MRN:     [id number]     Acct:      [id number]   Room:   341/341-01   Day:  1  Admit Date:  8/5/2024 12:21 PM    PCP:   Lee Sainz MD  Code Status:  DNR-CCA    Subjective:     C/C:   Chief Complaint   Patient presents with    Wrist Injury     Left wrist pain and deformity, unknown how pt injuried     Interval History Status: improved.     Vitals reviewed, afebrile and hemodynamically stable however requiring low-dose Levophed.  Saturating well on room air.  Labs reviewed, unremarkable. Cortisol 52.7 however collected in the afternoon.  No leukocytosis, hemoglobin downtrending 12.5-10.9 however likely dilutional as all cell lines dropped.  Urinalysis was not consistent with UTI.  CRP is elevated 137.4, sed rate elevated 53,

## 2024-08-06 NOTE — PROGRESS NOTES
Comprehensive Nutrition Assessment    Type and Reason for Visit:  Positive Nutrition Screen    Nutrition Recommendations/Plan:   Continue current diet  Glucerna TID (this is suitable for lactose-intolerance)  Monitor weight, intake, labs, skin     Malnutrition Assessment:  Malnutrition Status:  Insufficient data (unable to complete NFPE) (08/06/24 1101)    Context:  Acute Illness     Findings of the 6 clinical characteristics of malnutrition:  Energy Intake:  75% or less of estimated energy requirements for 7 or more days (predicted)  Weight Loss:  5% over 1 month     Body Fat Loss:  Unable to assess     Muscle Mass Loss:  Unable to assess    Fluid Accumulation:  No significant fluid accumulation     Strength:  Not Performed    Nutrition Assessment:    Pt is admitted with contusion of L wrist, shock. Hx dementia. PNS received for Orthodox/cultural/ethnic food preferences (pt does not eat pork and is lactose intolerant) which are noted in diet order. Upon chart review, writer noted pt has had varying weights over the past month. Noted some weights were taken with pt in wheelchair and are therefore inaccurate. However, given weight of 155# from 7/5/2024, pt has lost 8# over the past month which is a 5% significant change. No report of poor appetite or weight loss, but anticipate pt may have had inadequate intake over the past month with previous hospital admission. No wounds are noted. NaCl @ 100 ml/hr.    Nutrition Related Findings:    +1 LUE edema noted. .4, Glu 127. All other labs/meds reviewed. Wound Type: None       Current Nutrition Intake & Therapies:    Average Meal Intake: Unable to assess  Average Supplements Intake: None Ordered  ADULT DIET; Dysphagia - Soft and Bite Sized; No Pork, Lactose-Controlled    Anthropometric Measures:  Height: 165 cm (5' 4.96\")  Ideal Body Weight (IBW): 125 lbs (57 kg)    Current Body Weight: 67 kg (147 lb 11.3 oz), 118.2 %   Current BMI (kg/m2): 24.6  BMI Categories:

## 2024-08-06 NOTE — DISCHARGE INSTR - COC
Continuity of Care Form    Patient Name: Eliz Macias   :  1935  MRN:  [id number]    Admit date:  2024  Discharge date:  2024    Code Status Order: DNR-CCA   Advance Directives:     Admitting Physician:  Luis Davis DO  PCP: Lee Sainz MD    Discharging Nurse: Shanelle WHEELER  Discharging Hospital Unit/Room#: 341/341-01  Discharging Unit Phone Number: [phone]    Emergency Contact:   Extended Emergency Contact Information  Primary Emergency Contact: Deepika Macias  Home Phone: [phone]  Work Phone: [phone]  Relation: Child  Preferred language: English   needed? No    Past Surgical History:  Past Surgical History:   Procedure Laterality Date    HIP SURGERY Right 2024    RIGHT HIP HEMIARTHROPLASTY - Right    HIP SURGERY Right 2024    RIGHT HIP HEMIARTHROPLASTY performed by R[facility]z Marvin DO at [facility] OR       Immunization History:   Immunization History   Administered Date(s) Administered    COVID-19, PFIZER PURPLE top, DILUTE for use, (age 12 y+), 30mcg/0.3mL 2021, 2021, 2021    Influenza, FLUAD, (age 65 y+), Adjuvanted, 0.5mL 10/05/2021, 2022, 2023       Active Problems:  Patient Active Problem List   Diagnosis Code    Intracranial hemorrhage (HCC) I62.9    Subarachnoid hemorrhage following injury, no loss of consciousness ([facility]) S06.6X0A    Fx humeral neck, right, closed, initial encounter S42.211A    Alzheimer's disease with early onset (HCC) G30.0, F02.80    Chronic diastolic congestive heart failure (HCC) I50.32    Coronary artery disease involving native coronary artery of native heart without angina pectoris I25.10    DDD (degenerative disc disease), lumbar M51.36    Essential hypertension I10    ARCENIO (generalized anxiety disorder) F41.1    Gastroesophageal reflux disease without esophagitis K21.9    Hyperlipidemia E78.5    Occlusion and stenosis of bilateral carotid arteries I65.23    DM type 2 with diabetic mixed hyperlipidemia

## 2024-08-07 LAB
ANION GAP SERPL CALCULATED.3IONS-SCNC: 13 MMOL/L (ref 9–17)
BASOPHILS # BLD: 0.1 K/UL (ref 0–0.2)
BASOPHILS NFR BLD: 1 % (ref 0–2)
BUN SERPL-MCNC: 11 MG/DL (ref 8–23)
CALCIUM SERPL-MCNC: 8.2 MG/DL (ref 8.6–10.4)
CHLORIDE SERPL-SCNC: 108 MMOL/L (ref 98–107)
CO2 SERPL-SCNC: 18 MMOL/L (ref 20–31)
CORTIS SERPL-MCNC: 22.1 UG/DL (ref 2.5–19.5)
CORTISOL COLLECTION INFO: ABNORMAL
CREAT SERPL-MCNC: 0.5 MG/DL (ref 0.5–0.9)
DATE LAST DOSE: NORMAL
EOSINOPHIL # BLD: 0.2 K/UL (ref 0–0.4)
EOSINOPHILS RELATIVE PERCENT: 3 % (ref 1–4)
ERYTHROCYTE [DISTWIDTH] IN BLOOD BY AUTOMATED COUNT: 18.7 % (ref 12.5–15.4)
GFR, ESTIMATED: >90 ML/MIN/1.73M2
GLUCOSE BLD-MCNC: 153 MG/DL (ref 65–105)
GLUCOSE BLD-MCNC: 162 MG/DL (ref 65–105)
GLUCOSE BLD-MCNC: 179 MG/DL (ref 65–105)
GLUCOSE BLD-MCNC: 341 MG/DL (ref 65–105)
GLUCOSE SERPL-MCNC: 178 MG/DL (ref 70–99)
HCT VFR BLD AUTO: 35.3 % (ref 36–46)
HGB BLD-MCNC: 11.5 G/DL (ref 12–16)
LYMPHOCYTES NFR BLD: 0.6 K/UL (ref 1–4.8)
LYMPHOCYTES RELATIVE PERCENT: 9 % (ref 24–44)
MAGNESIUM SERPL-MCNC: 1.6 MG/DL (ref 1.6–2.6)
MCH RBC QN AUTO: 27.5 PG (ref 26–34)
MCHC RBC AUTO-ENTMCNC: 32.7 G/DL (ref 31–37)
MCV RBC AUTO: 84.1 FL (ref 80–100)
MONOCYTES NFR BLD: 0.4 K/UL (ref 0.1–1.2)
MONOCYTES NFR BLD: 6 % (ref 2–11)
NEUTROPHILS NFR BLD: 81 % (ref 36–66)
NEUTS SEG NFR BLD: 6 K/UL (ref 1.8–7.7)
PLATELET # BLD AUTO: 273 K/UL (ref 140–450)
PMV BLD AUTO: 8.5 FL (ref 6–12)
POTASSIUM SERPL-SCNC: 3.6 MMOL/L (ref 3.7–5.3)
RBC # BLD AUTO: 4.2 M/UL (ref 4–5.2)
SODIUM SERPL-SCNC: 139 MMOL/L (ref 135–144)
TME LAST DOSE: NORMAL H
VANCOMYCIN DOSE: NORMAL MG
VANCOMYCIN SERPL-MCNC: 10.1 UG/ML
WBC OTHER # BLD: 7.3 K/UL (ref 3.5–11)

## 2024-08-07 PROCEDURE — 99233 SBSQ HOSP IP/OBS HIGH 50: CPT | Performed by: INTERNAL MEDICINE

## 2024-08-07 PROCEDURE — 6360000002 HC RX W HCPCS: Performed by: HOSPITALIST

## 2024-08-07 PROCEDURE — 2580000003 HC RX 258: Performed by: HOSPITALIST

## 2024-08-07 PROCEDURE — 2580000003 HC RX 258: Performed by: STUDENT IN AN ORGANIZED HEALTH CARE EDUCATION/TRAINING PROGRAM

## 2024-08-07 PROCEDURE — 80048 BASIC METABOLIC PNL TOTAL CA: CPT

## 2024-08-07 PROCEDURE — 83735 ASSAY OF MAGNESIUM: CPT

## 2024-08-07 PROCEDURE — 2060000000 HC ICU INTERMEDIATE R&B

## 2024-08-07 PROCEDURE — 6370000000 HC RX 637 (ALT 250 FOR IP): Performed by: HOSPITALIST

## 2024-08-07 PROCEDURE — 82947 ASSAY GLUCOSE BLOOD QUANT: CPT

## 2024-08-07 PROCEDURE — 99232 SBSQ HOSP IP/OBS MODERATE 35: CPT | Performed by: STUDENT IN AN ORGANIZED HEALTH CARE EDUCATION/TRAINING PROGRAM

## 2024-08-07 PROCEDURE — 6360000002 HC RX W HCPCS: Performed by: STUDENT IN AN ORGANIZED HEALTH CARE EDUCATION/TRAINING PROGRAM

## 2024-08-07 PROCEDURE — 6370000000 HC RX 637 (ALT 250 FOR IP): Performed by: STUDENT IN AN ORGANIZED HEALTH CARE EDUCATION/TRAINING PROGRAM

## 2024-08-07 PROCEDURE — 80202 ASSAY OF VANCOMYCIN: CPT

## 2024-08-07 PROCEDURE — 85025 COMPLETE CBC W/AUTO DIFF WBC: CPT

## 2024-08-07 PROCEDURE — 99232 SBSQ HOSP IP/OBS MODERATE 35: CPT | Performed by: INTERNAL MEDICINE

## 2024-08-07 PROCEDURE — 82533 TOTAL CORTISOL: CPT

## 2024-08-07 PROCEDURE — 36415 COLL VENOUS BLD VENIPUNCTURE: CPT

## 2024-08-07 RX ORDER — POTASSIUM CHLORIDE 20 MEQ/1
40 TABLET, EXTENDED RELEASE ORAL ONCE
Status: COMPLETED | OUTPATIENT
Start: 2024-08-07 | End: 2024-08-07

## 2024-08-07 RX ORDER — MAGNESIUM SULFATE IN WATER 40 MG/ML
2000 INJECTION, SOLUTION INTRAVENOUS ONCE
Status: COMPLETED | OUTPATIENT
Start: 2024-08-07 | End: 2024-08-07

## 2024-08-07 RX ORDER — MIDODRINE HYDROCHLORIDE 5 MG/1
5 TABLET ORAL 3 TIMES DAILY PRN
Status: DISCONTINUED | OUTPATIENT
Start: 2024-08-07 | End: 2024-08-08 | Stop reason: HOSPADM

## 2024-08-07 RX ADMIN — SERTRALINE HYDROCHLORIDE 50 MG: 50 TABLET ORAL at 08:23

## 2024-08-07 RX ADMIN — CEFEPIME 1000 MG: 1 INJECTION, POWDER, FOR SOLUTION INTRAMUSCULAR; INTRAVENOUS at 23:58

## 2024-08-07 RX ADMIN — APIXABAN 5 MG: 5 TABLET, FILM COATED ORAL at 08:23

## 2024-08-07 RX ADMIN — TROSPIUM CHLORIDE 20 MG: 20 TABLET, FILM COATED ORAL at 07:41

## 2024-08-07 RX ADMIN — POTASSIUM CHLORIDE 40 MEQ: 1500 TABLET, EXTENDED RELEASE ORAL at 14:51

## 2024-08-07 RX ADMIN — MAGNESIUM SULFATE HEPTAHYDRATE 2000 MG: 40 INJECTION, SOLUTION INTRAVENOUS at 14:51

## 2024-08-07 RX ADMIN — RIVASTIGMINE TARTRATE 3 MG: 1.5 CAPSULE ORAL at 08:22

## 2024-08-07 RX ADMIN — LEVOTHYROXINE SODIUM 100 MCG: 50 TABLET ORAL at 07:41

## 2024-08-07 RX ADMIN — EZETIMIBE 10 MG: 10 TABLET ORAL at 08:23

## 2024-08-07 RX ADMIN — CEFEPIME 1000 MG: 1 INJECTION, POWDER, FOR SOLUTION INTRAMUSCULAR; INTRAVENOUS at 11:59

## 2024-08-07 RX ADMIN — TROSPIUM CHLORIDE 20 MG: 20 TABLET, FILM COATED ORAL at 14:51

## 2024-08-07 RX ADMIN — ATORVASTATIN CALCIUM 40 MG: 40 TABLET, FILM COATED ORAL at 08:23

## 2024-08-07 RX ADMIN — RIVASTIGMINE TARTRATE 3 MG: 1.5 CAPSULE ORAL at 20:56

## 2024-08-07 RX ADMIN — ASPIRIN 81 MG: 81 TABLET, COATED ORAL at 08:23

## 2024-08-07 RX ADMIN — SODIUM CHLORIDE, PRESERVATIVE FREE 10 ML: 5 INJECTION INTRAVENOUS at 20:56

## 2024-08-07 RX ADMIN — APIXABAN 5 MG: 5 TABLET, FILM COATED ORAL at 20:56

## 2024-08-07 RX ADMIN — SODIUM CHLORIDE, PRESERVATIVE FREE 10 ML: 5 INJECTION INTRAVENOUS at 08:23

## 2024-08-07 RX ADMIN — CLOPIDOGREL BISULFATE 75 MG: 75 TABLET ORAL at 08:23

## 2024-08-07 RX ADMIN — MIDODRINE HYDROCHLORIDE 5 MG: 5 TABLET ORAL at 08:23

## 2024-08-07 RX ADMIN — ACETAMINOPHEN 650 MG: 325 TABLET ORAL at 18:25

## 2024-08-07 RX ADMIN — METOPROLOL TARTRATE 25 MG: 25 TABLET, FILM COATED ORAL at 20:56

## 2024-08-07 RX ADMIN — VANCOMYCIN HYDROCHLORIDE 1000 MG: 1 INJECTION, POWDER, LYOPHILIZED, FOR SOLUTION INTRAVENOUS at 10:49

## 2024-08-07 RX ADMIN — BUSPIRONE HYDROCHLORIDE 5 MG: 5 TABLET ORAL at 08:23

## 2024-08-07 RX ADMIN — DOCUSATE SODIUM 100 MG: 100 CAPSULE, LIQUID FILLED ORAL at 08:23

## 2024-08-07 RX ADMIN — BUSPIRONE HYDROCHLORIDE 5 MG: 5 TABLET ORAL at 20:56

## 2024-08-07 ASSESSMENT — ENCOUNTER SYMPTOMS
GASTROINTESTINAL NEGATIVE: 1
RESPIRATORY NEGATIVE: 1

## 2024-08-07 NOTE — PROGRESS NOTES
PULMONARY & CRITICAL CARE MEDICINE PROGRESS NOTE     Patient:  Eliz Macias  MRN: [id number]  Admit date: 2024  Primary Care Physician: Lee Sainz MD  CODE Status: DNR-CCA  LOS: 2    SUBJECTIVE     CHIEF COMPLAINT/REASON FOR CONSULT: Wrist Injury (Left wrist pain and deformity, unknown how pt injuried)    HISTORY OF PRESENT ILLNESS:  The patient is a 88 y.o. female with past medical history of hypertension, hyperlipidemia, dementia was brought to the hospital for concerns for wrist pain.  She was hypotensive on presentation.  She had recent hospitalization at [place] for right hip surgery and required Levophed postoperatively.  Lab evaluation was unremarkable.  Patient required Levophed to maintain blood pressure, which was weaned off this morning.  Patient unable to provide much history secondary to dementia and language barrier.  She is awake and alert and looks comfortable.  She is on empiric vancomycin and cefepime.    INTERVAL HISTORY:  2024  Hemodynamically stable  On room air  No overnight issues reported      REVIEW OF SYSTEMS:  Review of Systems   Unable to perform ROS: Mental status change       OBJECTIVE     VITAL SIGNS:   LAST:  /80   Pulse 92   Temp 98.4 °F (36.9 °C) (Oral)   Resp 27   Ht 1.65 m (5' 4.96\")   Wt 66 kg (145 lb 8.1 oz)   LMP  (LMP Unknown)   SpO2 96%   BMI 24.24 kg/m²   8-24 HR RANGE:  TEMP Temp  Av.1 °F (36.7 °C)  Min: 97 °F (36.1 °C)  Max: 98.6 °F (37 °C)   BP Systolic (24hrs), Av , Min:88 , Max:164      Diastolic (24hrs), Av, Min:47, Max:108     PULSE Pulse  Av.9  Min: 67  Max: 101   RR Resp  Av.1  Min: 14  Max: 27   O2 SAT SpO2  Av.4 %  Min: 91 %  Max: 97 %   OXYGEN DELIVERY No data recorded        PHYSICAL EXAM:  Physical Exam  Constitutional:       General: She is awake.   HENT:      Head: Normocephalic and atraumatic.   Eyes:      General: No scleral icterus.     Conjunctiva/sclera: Conjunctivae normal.   Cardiovascular:

## 2024-08-07 NOTE — PROGRESS NOTES
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  Alzheimer's type dementia  Fall with hip fracture status post right hip hemiarthroplasty 7/6/2024    Recommendations:   The patient continues on empiric antibiotic therapy in the culture data thus far remains negative  Clinically the patient is doing well and is awake and alert and remains off pressors for blood pressure support  The left wrist swelling remains with some mild erythema but this is markedly improved    Infection Control Recommendations:   Universal precautions    Discharge Planning:   Estimated Length of IV antimicrobials: To be determined  Patient will need Midline Catheter Insertion/ PICC line Insertion: No  Patient will need: Home IV , Infusion Center,  SNF,  LTAC: Undetermined  Patient willneed outpatient wound care: No    Medical Decision making / Summary of Stay:   Eliz Macias is a 88 y.o.-year-old female who was initially admitted on 8/5/2024.   Eliz has multiple medical problems including early onset Alzheimer's, coronary artery disease, depression/generalized anxiety disorder, essential hypertension, hyperlipidemia, diabetes mellitus type 2, atrial fibrillation, chronic diastolic heart failure, and was recently admitted after a fall which she underwent a right Franck arthroplasty of the hip.     The patient was at home with family and was otherwise doing well ambulating with a walker and it is my understanding that the family noticed that her left hand/wrist area was swollen and red yesterday morning and she was complaining of pain and for that reason they brought her into the emergency room as they thought maybe she had  mild erythema) present.      Cervical back: Normal range of motion and neck supple.   Skin:     General: Skin is warm and dry.   Neurological:      Mental Status: She is alert and oriented to person, place, and time.         Laboratory data:   I have independently reviewed the followinglabs:  CBC with Differential:   Recent Labs     08/06/24  0628 08/07/24  1017   WBC 9.7 7.3   HGB 10.9* 11.5*   HCT 33.5* 35.3*    273   LYMPHOPCT 6* 9*   MONOPCT 7 6   EOSPCT 2 3     BMP:   Recent Labs     08/06/24  0628 08/07/24  1017    139   K 3.7 3.6*   * 108*   CO2 19* 18*   BUN 15 11   CREATININE 0.6 0.5   MG  --  1.6     Hepatic Function Panel: No results for input(s): \"LABALBU\", \"BILIDIR\", \"IBILI\", \"BILITOT\", \"ALKPHOS\", \"ALT\", \"AST\" in the last 72 hours.    Invalid input(s): \"PROT\"      Lab Results   Component Value Date/Time    PROCAL 0.31 08/06/2024 08:18 AM     Lab Results   Component Value Date/Time    .4 08/06/2024 06:28 AM     Lab Results   Component Value Date    SEDRATE 53 (H) 08/06/2024         Lab Results   Component Value Date/Time    DDIMER 3.80 07/05/2024 01:51 AM     No results found for: \"FERRITIN\"  No results found for: \"LDH\"  No results found for: \"FIBRINOGEN\"    No results found for requested labs within last 30 days.     Lab Results   Component Value Date/Time    COVID19 Not Detected 07/05/2024 12:59 AM       No results for input(s): \"VANCOTROUGH\" in the last 72 hours.    Imaging Studies:   CT OF THE HEAD WITHOUT CONTRAST 8/5/2024 3:31 pm   IMPRESSION:  Study limited by motion artifact.  Within limitation, no acute intracranial  abnormality.              Specimen Collected: 08/05/24 16:02 EDT Last Resulted: 08/06/24 01:22 EDT           Cultures:   Culture and Sensitivities:  Recent Labs     08/05/24  1709   SPECDESC .BLOOD   SPECIAL RIGHT INDEX FINGER .5CC   CULTURE NO GROWTH 1 DAY     Procedure Component Value Units Date/Time   Culture, Blood 1 [[id number]] Collected: 08/05/24

## 2024-08-07 NOTE — PLAN OF CARE
Problem: Safety - Adult  Goal: Free from fall injury  8/7/2024 0847 by Lalita Houser RN  Outcome: Progressing  8/7/2024 0031 by Hermilo Hammer RN  Outcome: Progressing     Problem: Discharge Planning  Goal: Discharge to home or other facility with appropriate resources  Outcome: Progressing     Problem: Pain  Goal: Verbalizes/displays adequate comfort level or baseline comfort level  Outcome: Progressing     Problem: Skin/Tissue Integrity  Goal: Absence of new skin breakdown  Description: 1.  Monitor for areas of redness and/or skin breakdown  2.  Assess vascular access sites hourly  3.  Every 4-6 hours minimum:  Change oxygen saturation probe site  4.  Every 4-6 hours:  If on nasal continuous positive airway pressure, respiratory therapy assess nares and determine need for appliance change or resting period.  8/7/2024 0847 by Lalita Houser RN  Outcome: Progressing  8/7/2024 0031 by Hermilo Hammer RN  Outcome: Progressing     Problem: ABCDS Injury Assessment  Goal: Absence of physical injury  8/7/2024 0847 by Lalita Houser RN  Outcome: Progressing  8/7/2024 0031 by Hermilo Hammer RN  Outcome: Progressing     Problem: Cardiovascular - Adult  Goal: Maintains optimal cardiac output and hemodynamic stability  8/7/2024 0847 by Lalita Houser RN  Outcome: Progressing  8/7/2024 0031 by Hermilo Hammer RN  Outcome: Progressing     Problem: Skin/Tissue Integrity - Adult  Goal: Skin integrity remains intact  8/7/2024 0847 by Lalita Houser RN  Outcome: Progressing  8/7/2024 0031 by Hermilo Hammer RN  Outcome: Progressing     Problem: Musculoskeletal - Adult  Goal: Return mobility to safest level of function  8/7/2024 0847 by Lalita Houser RN  Outcome: Progressing  8/7/2024 0031 by Hermilo Hammer RN  Outcome: Progressing     Problem: Genitourinary - Adult  Goal: Urinary catheter remains patent  8/7/2024 0847 by Lalita Houser RN  Outcome:  Progressing  8/7/2024 0031 by Hermilo Hammer, RN  Outcome: Progressing     Problem: Nutrition Deficit:  Goal: Optimize nutritional status  8/7/2024 0847 by Lalita Houser RN  Outcome: Progressing  8/7/2024 0031 by Hermilo Hammer, RN  Outcome: Progressing

## 2024-08-07 NOTE — PLAN OF CARE
Problem: Safety - Adult  Goal: Free from fall injury  8/7/2024 0031 by Hermilo Hammer RN  Outcome: Progressing  8/6/2024 1826 by Xochilt Jansen RN  Outcome: Progressing     Problem: Discharge Planning  Goal: Discharge to home or other facility with appropriate resources  8/6/2024 1826 by Xochilt Jansen RN  Outcome: Progressing  Flowsheets (Taken 8/6/2024 0800)  Discharge to home or other facility with appropriate resources: Identify barriers to discharge with patient and caregiver     Problem: Skin/Tissue Integrity  Goal: Absence of new skin breakdown  Description: 1.  Monitor for areas of redness and/or skin breakdown  2.  Assess vascular access sites hourly  3.  Every 4-6 hours minimum:  Change oxygen saturation probe site  4.  Every 4-6 hours:  If on nasal continuous positive airway pressure, respiratory therapy assess nares and determine need for appliance change or resting period.  8/7/2024 0031 by Hermilo Hammer RN  Outcome: Progressing  8/6/2024 1826 by Xochilt Jansen RN  Outcome: Progressing     Problem: ABCDS Injury Assessment  Goal: Absence of physical injury  8/7/2024 0031 by Hermilo Hammer RN  Outcome: Progressing  8/6/2024 1826 by Xochilt Jansen RN  Outcome: Progressing     Problem: Cardiovascular - Adult  Goal: Maintains optimal cardiac output and hemodynamic stability  8/7/2024 0031 by Hermilo Hammer RN  Outcome: Progressing  8/6/2024 1826 by Xochilt Jansen RN  Outcome: Progressing  Flowsheets (Taken 8/6/2024 0800)  Maintains optimal cardiac output and hemodynamic stability: Monitor blood pressure and heart rate     Problem: Skin/Tissue Integrity - Adult  Goal: Skin integrity remains intact  8/7/2024 0031 by Hermilo Hammer RN  Outcome: Progressing  8/6/2024 1826 by Xochilt Jansen RN  Outcome: Progressing  Flowsheets (Taken 8/6/2024 0800)  Skin Integrity Remains Intact: Monitor for areas of redness and/or skin breakdown     Problem: Musculoskeletal - Adult  Goal: Return  mobility to safest level of function  8/7/2024 0031 by Hermilo Hammer RN  Outcome: Progressing  8/6/2024 1826 by Xochilt Jansen RN  Outcome: Progressing  Flowsheets (Taken 8/6/2024 0800)  Return Mobility to Safest Level of Function: Assess patient stability and activity tolerance for standing, transferring and ambulating with or without assistive devices     Problem: Genitourinary - Adult  Goal: Urinary catheter remains patent  8/7/2024 0031 by Hermilo Hammer RN  Outcome: Progressing  8/6/2024 1826 by Xochilt Jansen RN  Outcome: Progressing  Flowsheets (Taken 8/6/2024 0800)  Urinary catheter remains patent: Assess patency of urinary catheter     Problem: Nutrition Deficit:  Goal: Optimize nutritional status  8/7/2024 0031 by Hermilo Hammer RN  Outcome: Progressing  8/6/2024 1826 by Xochilt Jansen RN  Outcome: Progressing  8/6/2024 1105 by Sherry Kennedy RD  Flowsheets (Taken 8/6/2024 1105)  Nutrient intake appropriate for improving, restoring, or maintaining nutritional needs:   Recommend appropriate diets, oral nutritional supplements, and vitamin/mineral supplements   Monitor oral intake, labs, and treatment plans   Assess nutritional status and recommend course of action

## 2024-08-07 NOTE — PROGRESS NOTES
[facility]   Pharmacy Pharmacokinetic Monitoring Service - Vancomycin    Consulting Provider: Luis Davis DO (ID following)   Indication: Suspected sepsis  Target Concentration: Goal AUC/EDEN 400-600 mg*hr/L  Day of Therapy: 3  Additional Antimicrobials: cefepime    Pertinent Laboratory Values:   Wt Readings from Last 1 Encounters:   08/07/24 66 kg (145 lb 8.1 oz)     Temp Readings from Last 1 Encounters:   08/07/24 98.4 °F (36.9 °C) (Oral)     Estimated Creatinine Clearance: 59 mL/min (based on SCr of 0.6 mg/dL).  Recent Labs     08/05/24  1340 08/06/24  0628   CREATININE 0.7 0.6   BUN 19 15   WBC 11.0 9.7      Latest Reference Range & Units 08/06/24 08:18   Procalcitonin 0.00 - 0.09 ng/mL 0.31 (H)   (H): Data is abnormally high    Pertinent Cultures:  Culture Date Source Results   08.07.24 Blood x 2 NGTD   MRSA Nasal Swab:  n/a    Recent vancomycin administrations                     vancomycin (VANCOCIN) 1,000 mg in sodium chloride 0.9 % 250 mL IVPB ([id number]) (mg) 1,000 mg New Bag 08/06/24 1633     1,000 mg New Bag 08/05/24 2203                    Assessment:  Date/Time Current Dose Concentration Timing of Concentration (h) AUC   08.07.24 1000 mg IV q18h 10.1 13 h 35 m 449   Note: Serum concentrations collected for AUC dosing may appear elevated if collected in close proximity to the dose administered, this is not necessarily an indication of toxicity    Plan:  Current dosing regimen is therapeutic  Continue current dose  F/u with ID today regarding continued dosing  Pharmacy will continue to monitor patient and adjust therapy as indicated    Thank you for the consult,  Brando Pa RPH  8/7/2024 8:32 AM

## 2024-08-07 NOTE — PROGRESS NOTES
[facility]  Office: [phone]  Eugenio Davis DO, Sathish Michelle DO, Chito Duke DO, Tommie Camacho DO, Sanjay Koroma MD, Summer Salazar MD, Luis Enrique Beckman MD, Dhara Morales MD,  Jourdan Moore MD, Eris Boyle MD, Karlo Gibbs MD,  Rhys Das DO, Álvaro Couch MD, Carlos Bender MD, Luis Davis DO, Julia Fitzpatrick MD,  Lincoln Chamberlain DO, Argenis Lopez MD, Eleanor Johnson MD, Lara Murcia MD, Alise Newell MD,  Jesse Henderson MD, Torito Stacy MD, Celine Woo MD, Jerrica Rosales MD, Varinder Alves MD, Jade Can MD, Harrison Rodriguez DO, Matt Beatty DO, Cristina Figueroa MD,  Fransisco Ramirez MD, Shirley Waterhouse, CNP,  Mary Beth Tomas CNP, Alok Zimmerman, CNP,  Shanelle Oquendo, DNP, Gina Torrez, CNP, Steffany Hillman, CNP, Natty Guerra, CNP, Mayra Sandoval, CNP, Elizabeth Durand, PA-C, Darline Khan PA-C, Jojo Figueredo, CNP, Natalya Beckwith, CNP, Quentin Lynne, CNP, Dominique Barton, CNP, Yomaira Curran, CNP, Nereyda Will, CNS, Briana Jane, CNP, Shyann Linton CNP, Tracy Schwab, CNP         [facility]   IN-PATIENT SERVICE   [facility]    Progress Note    8/7/2024    9:09 AM    Name:   Eliz Macias  MRN:     [id number]     Acct:      [id number]   Room:   341/341-01   Day:  2  Admit Date:  8/5/2024 12:21 PM    PCP:   Lee Sainz MD  Code Status:  DNR-CCA    Subjective:     C/C:   Chief Complaint   Patient presents with    Wrist Injury     Left wrist pain and deformity, unknown how pt injuried     Interval History Status: improved.     Vitals reviewed, afebrile and hemodynamically stable however requiring low-dose Levophed. Saturating well on room air.  Labs reviewed, mild hypokalemia 3.6 will replace with 40 mEq, chloride climbing and bicarb dropping suspicious for non-anion gap metabolic acidosis secondary to normal saline infusion.  Stop fluids as patient is drinking well.  Cortisol within normal range this morning.  Chest

## 2024-08-08 VITALS
TEMPERATURE: 97.9 F | SYSTOLIC BLOOD PRESSURE: 128 MMHG | WEIGHT: 152.12 LBS | RESPIRATION RATE: 20 BRPM | HEIGHT: 65 IN | OXYGEN SATURATION: 96 % | HEART RATE: 70 BPM | DIASTOLIC BLOOD PRESSURE: 80 MMHG | BODY MASS INDEX: 25.34 KG/M2

## 2024-08-08 LAB
ANION GAP SERPL CALCULATED.3IONS-SCNC: 10 MMOL/L (ref 9–17)
BUN SERPL-MCNC: 11 MG/DL (ref 8–23)
CALCIUM SERPL-MCNC: 8.2 MG/DL (ref 8.6–10.4)
CHLORIDE SERPL-SCNC: 107 MMOL/L (ref 98–107)
CO2 SERPL-SCNC: 20 MMOL/L (ref 20–31)
CREAT SERPL-MCNC: 0.5 MG/DL (ref 0.5–0.9)
GFR, ESTIMATED: >90 ML/MIN/1.73M2
GLUCOSE BLD-MCNC: 194 MG/DL (ref 65–105)
GLUCOSE BLD-MCNC: 247 MG/DL (ref 65–105)
GLUCOSE SERPL-MCNC: 209 MG/DL (ref 70–99)
MAGNESIUM SERPL-MCNC: 1.9 MG/DL (ref 1.6–2.6)
POTASSIUM SERPL-SCNC: 4 MMOL/L (ref 3.7–5.3)
SODIUM SERPL-SCNC: 137 MMOL/L (ref 135–144)

## 2024-08-08 PROCEDURE — 82947 ASSAY GLUCOSE BLOOD QUANT: CPT

## 2024-08-08 PROCEDURE — 6370000000 HC RX 637 (ALT 250 FOR IP): Performed by: STUDENT IN AN ORGANIZED HEALTH CARE EDUCATION/TRAINING PROGRAM

## 2024-08-08 PROCEDURE — 80048 BASIC METABOLIC PNL TOTAL CA: CPT

## 2024-08-08 PROCEDURE — 6360000002 HC RX W HCPCS: Performed by: HOSPITALIST

## 2024-08-08 PROCEDURE — 97535 SELF CARE MNGMENT TRAINING: CPT

## 2024-08-08 PROCEDURE — 36415 COLL VENOUS BLD VENIPUNCTURE: CPT

## 2024-08-08 PROCEDURE — 2580000003 HC RX 258: Performed by: HOSPITALIST

## 2024-08-08 PROCEDURE — 99232 SBSQ HOSP IP/OBS MODERATE 35: CPT | Performed by: STUDENT IN AN ORGANIZED HEALTH CARE EDUCATION/TRAINING PROGRAM

## 2024-08-08 PROCEDURE — 6370000000 HC RX 637 (ALT 250 FOR IP): Performed by: HOSPITALIST

## 2024-08-08 PROCEDURE — 2580000003 HC RX 258: Performed by: STUDENT IN AN ORGANIZED HEALTH CARE EDUCATION/TRAINING PROGRAM

## 2024-08-08 PROCEDURE — 97166 OT EVAL MOD COMPLEX 45 MIN: CPT

## 2024-08-08 PROCEDURE — 97162 PT EVAL MOD COMPLEX 30 MIN: CPT

## 2024-08-08 PROCEDURE — 99232 SBSQ HOSP IP/OBS MODERATE 35: CPT | Performed by: INTERNAL MEDICINE

## 2024-08-08 PROCEDURE — 97116 GAIT TRAINING THERAPY: CPT

## 2024-08-08 PROCEDURE — 83735 ASSAY OF MAGNESIUM: CPT

## 2024-08-08 PROCEDURE — 6360000002 HC RX W HCPCS: Performed by: STUDENT IN AN ORGANIZED HEALTH CARE EDUCATION/TRAINING PROGRAM

## 2024-08-08 RX ADMIN — TROSPIUM CHLORIDE 20 MG: 20 TABLET, FILM COATED ORAL at 06:11

## 2024-08-08 RX ADMIN — INSULIN LISPRO 2 UNITS: 100 INJECTION, SOLUTION INTRAVENOUS; SUBCUTANEOUS at 12:06

## 2024-08-08 RX ADMIN — SERTRALINE HYDROCHLORIDE 50 MG: 50 TABLET ORAL at 09:16

## 2024-08-08 RX ADMIN — EZETIMIBE 10 MG: 10 TABLET ORAL at 09:17

## 2024-08-08 RX ADMIN — CEFEPIME 1000 MG: 1 INJECTION, POWDER, FOR SOLUTION INTRAMUSCULAR; INTRAVENOUS at 12:03

## 2024-08-08 RX ADMIN — LEVOTHYROXINE SODIUM 100 MCG: 50 TABLET ORAL at 06:11

## 2024-08-08 RX ADMIN — ATORVASTATIN CALCIUM 40 MG: 40 TABLET, FILM COATED ORAL at 09:16

## 2024-08-08 RX ADMIN — METOPROLOL TARTRATE 25 MG: 25 TABLET, FILM COATED ORAL at 09:16

## 2024-08-08 RX ADMIN — VANCOMYCIN HYDROCHLORIDE 1000 MG: 1 INJECTION, POWDER, LYOPHILIZED, FOR SOLUTION INTRAVENOUS at 04:30

## 2024-08-08 RX ADMIN — APIXABAN 5 MG: 5 TABLET, FILM COATED ORAL at 09:16

## 2024-08-08 RX ADMIN — RIVASTIGMINE TARTRATE 3 MG: 1.5 CAPSULE ORAL at 09:15

## 2024-08-08 RX ADMIN — BUSPIRONE HYDROCHLORIDE 5 MG: 5 TABLET ORAL at 09:16

## 2024-08-08 RX ADMIN — TROSPIUM CHLORIDE 20 MG: 20 TABLET, FILM COATED ORAL at 15:45

## 2024-08-08 RX ADMIN — DOCUSATE SODIUM 100 MG: 100 CAPSULE, LIQUID FILLED ORAL at 09:18

## 2024-08-08 RX ADMIN — SODIUM CHLORIDE, PRESERVATIVE FREE 10 ML: 5 INJECTION INTRAVENOUS at 09:15

## 2024-08-08 RX ADMIN — ASPIRIN 81 MG: 81 TABLET, COATED ORAL at 09:15

## 2024-08-08 RX ADMIN — CLOPIDOGREL BISULFATE 75 MG: 75 TABLET ORAL at 09:16

## 2024-08-08 ASSESSMENT — PAIN SCALES - GENERAL
PAINLEVEL_OUTOF10: 0

## 2024-08-08 ASSESSMENT — ENCOUNTER SYMPTOMS
WHEEZING: 0
CONSTIPATION: 0
NAUSEA: 0
ABDOMINAL PAIN: 0
COUGH: 1
SHORTNESS OF BREATH: 0
VOMITING: 0
DIARRHEA: 0

## 2024-08-08 NOTE — DISCHARGE INSTRUCTIONS
Follow up with your PCP in 3 days. Call for an appointment as soon as possible.  Follow-up with specialist as instructed.  Call for an appointment as soon as possible.  - Follow up with Orthopedic Surgery as previously instructed post Hip Replacement.   Medications as instructed.  Please ensure you stop taking Valsartan 80 MG tablet. This medication was stopped during your previous admission.   Return to the emergency department immediately for any new or worsening concerns.

## 2024-08-08 NOTE — PROGRESS NOTES
[facility]  Office: [phone]  Eugenio Davis DO, Sathish Michelle DO, Chito Duke DO, Tommie Camacho DO, Sanjay Koroma MD, Summer Salazar MD, Luis Enrique Beckman MD, Dhara Morales MD,  Jourdan Moore MD, Eris Boyle MD, Karlo Gibbs MD,  Rhys Das DO, Álvaro Couch MD, Carlos Bender MD, Luis Davis DO, Julia Fitzpatrick MD,  Lincoln Chamberlain DO, Argenis Lopez MD, Eleanor Johnson MD, Lara Murcia MD, Alise Newell MD,  Jesse Henderson MD, Torito Stacy MD, Celine Woo MD, Jerrica Rosales MD, Varinder Alves MD, Jade Can MD, Harrison Rodriguez DO, Matt Beatty DO, Cristina Figueroa MD,  Fransisco Ramirez MD, Shirley Waterhouse, CNP,  Mary Beth Tomas CNP, Alok Zimmerman, CNP,  Shanelle Oquendo, DNP, Gina Torrez, CNP, Steffany Hillman, CNP, Natty Guerra, CNP, Mayra Sandoval, CNP, Elizabeth Durand, PA-C, Darline Khan PA-C, Jojo Figueredo, CNP, Natalya Beckwith, CNP, Quentin Lynne, CNP, Dominique Barton, CNP, Yomaira Curran, CNP, Nereyda Will, CNS, Briana Jane, CNP, Shyann Linton CNP, Tracy Schwab, CNP         [facility]   IN-PATIENT SERVICE   [facility]    Progress Note    8/8/2024    7:50 AM    Name:   Eliz Macias  MRN:     [id number]     Acct:      [id number]   Room:   341/341-01   Day:  3  Admit Date:  8/5/2024 12:21 PM    PCP:   Lee Sainz MD  Code Status:  DNR-CCA    Subjective:     C/C:   Chief Complaint   Patient presents with    Wrist Injury     Left wrist pain and deformity, unknown how pt injuried     Interval History Status: improved.     Vitals reviewed, afebrile and hemodynamically stable. Saturating well on room air.  Labs reviewed, stable with mild hyperglycemia.  Chest x-ray with chronic pulmonary change with bibasilar infiltrates.  CT head with no acute intracranial abnormality.  Overnight patient has been weaned off Levophed.    On examination patient resting comfortably in bed. Family at bedside so was able to obtain history

## 2024-08-08 NOTE — PROGRESS NOTES
Occupational Therapy  Facility/Department: [address]  Occupational Therapy Initial Assessment    Name: Eliz Macias  : 1935  MRN: [id number]  Date of Service: 2024    Discharge Recommendations:  Patient would benefit from continued therapy after discharge  OT Equipment Recommendations  Other: TBD     Chief Complaint   Patient presents with    Wrist Injury     Left wrist pain and deformity, unknown how pt injuried      Patient Diagnosis(es): The primary encounter diagnosis was Hypotension, unspecified hypotension type. A diagnosis of Contusion of left wrist, initial encounter was also pertinent to this visit.  Past Medical History:  has a past medical history of CAD (coronary artery disease), Dementia (HCC), Diabetes mellitus (HCC), Fall, Hyperlipidemia, Hypertension, and Parkinsonian features.  Past Surgical History:  has a past surgical history that includes hip surgery (Right, 2024) and hip surgery (Right, 2024).           Assessment   Performance deficits / Impairments: Decreased functional mobility ;Decreased ADL status;Decreased balance  Assessment: Patient demonstrated decreased ADLs, balance and functional mobility following hospitalization due to painful/swollen L wrist, (+) hypontension. Patient would benefit from skilled OT services addressing above deficits while here at hospital to maximize independence. Recommend 24 hr care and continued therapy at discharge.  Prognosis: Good  Decision Making: Medium Complexity  REQUIRES OT FOLLOW-UP: Yes  Activity Tolerance  Activity Tolerance: Patient limited by fatigue        Plan   Occupational Therapy Plan  Times Per Week: 5-6x/wk  Current Treatment Recommendations: Balance training, Functional mobility training, Safety education & training, Patient/Caregiver education & training, Equipment evaluation, education, & procurement, Self-Care / ADL     Restrictions  Restrictions/Precautions  Restrictions/Precautions: Fall Risk, Bed Alarm  Required

## 2024-08-08 NOTE — CARE COORDINATION
Patient is being discharged home with family today. She will resume care with [facility]. [facility]. Message left with Danielle at [facility] to notify her of discharge. Patient and family agree with discharge plan and denies any other needs at this time.

## 2024-08-08 NOTE — PLAN OF CARE
Problem: Safety - Adult  Goal: Free from fall injury  Outcome: Progressing  Flowsheets (Taken 8/8/2024 1500)  Free From Fall Injury: Instruct family/caregiver on patient safety     Problem: Discharge Planning  Goal: Discharge to home or other facility with appropriate resources  Outcome: Progressing  Flowsheets (Taken 8/8/2024 0805)  Discharge to home or other facility with appropriate resources:   Identify barriers to discharge with patient and caregiver   Arrange for needed discharge resources and transportation as appropriate   Identify discharge learning needs (meds, wound care, etc)     Problem: Pain  Goal: Verbalizes/displays adequate comfort level or baseline comfort level  Outcome: Progressing     Problem: Skin/Tissue Integrity  Goal: Absence of new skin breakdown  Description: 1.  Monitor for areas of redness and/or skin breakdown  2.  Assess vascular access sites hourly  3.  Every 4-6 hours minimum:  Change oxygen saturation probe site  4.  Every 4-6 hours:  If on nasal continuous positive airway pressure, respiratory therapy assess nares and determine need for appliance change or resting period.  Outcome: Progressing     Problem: ABCDS Injury Assessment  Goal: Absence of physical injury  Outcome: Progressing  Flowsheets (Taken 8/8/2024 1500)  Absence of Physical Injury: Implement safety measures based on patient assessment     Problem: Cardiovascular - Adult  Goal: Maintains optimal cardiac output and hemodynamic stability  Outcome: Progressing  Flowsheets (Taken 8/8/2024 0805)  Maintains optimal cardiac output and hemodynamic stability:   Monitor blood pressure and heart rate   Assess for signs of decreased cardiac output   Monitor urine output and notify Licensed Independent Practitioner for values outside of normal range   Administer fluid and/or volume expanders as ordered     Problem: Skin/Tissue Integrity - Adult  Goal: Skin integrity remains intact  Outcome: Progressing  Flowsheets  Taken

## 2024-08-08 NOTE — PROGRESS NOTES
PULMONARY & CRITICAL CARE MEDICINE PROGRESS NOTE     Patient:  Eliz Macias  MRN: [id number]  Admit date: 2024  Primary Care Physician: Lee Sainz MD  CODE Status: DNR-CCA  LOS: 3    SUBJECTIVE     CHIEF COMPLAINT/REASON FOR CONSULT: Wrist Injury (Left wrist pain and deformity, unknown how pt injuried)    HISTORY OF PRESENT ILLNESS:  The patient is a 88 y.o. female with past medical history of hypertension, hyperlipidemia, dementia was brought to the hospital for concerns for wrist pain.  She was hypotensive on presentation.  She had recent hospitalization at [facility] for right hip surgery and required Levophed postoperatively.  Lab evaluation was unremarkable.  Patient required Levophed to maintain blood pressure, which was weaned off this morning.  Patient unable to provide much history secondary to dementia and language barrier.  She is awake and alert and looks comfortable.  She is on empiric vancomycin and cefepime.    INTERVAL HISTORY:  2024  Hemodynamically stable  On room air  No overnight issues reported  Plan for discharge later this afternoon    REVIEW OF SYSTEMS:  Review of Systems   Unable to perform ROS: Mental status change       OBJECTIVE     VITAL SIGNS:   LAST:  /82   Pulse 67   Temp 97.8 °F (36.6 °C) (Oral)   Resp 20   Ht 1.65 m (5' 4.96\")   Wt 69 kg (152 lb 1.9 oz)   LMP  (LMP Unknown)   SpO2 95%   BMI 25.34 kg/m²   8-24 HR RANGE:  TEMP Temp  Av.8 °F (37.1 °C)  Min: 97.4 °F (36.3 °C)  Max: 100.3 °F (37.9 °C)   BP Systolic (24hrs), Av , Min:106 , Max:161      Diastolic (24hrs), Av, Min:69, Max:108     PULSE Pulse  Av.6  Min: 67  Max: 115   RR Resp  Av.5  Min: 20  Max: 31   O2 SAT SpO2  Av %  Min: 93 %  Max: 95 %   OXYGEN DELIVERY No data recorded        PHYSICAL EXAM:  Physical Exam  Constitutional:       General: She is awake.   HENT:      Head: Normocephalic and atraumatic.   Eyes:      General: No scleral icterus.     Conjunctiva/sclera:

## 2024-08-08 NOTE — PROGRESS NOTES
Physical Therapy  Facility/Department: [address]  Physical Therapy Initial Assessment    Name: Eliz Macias  : 1935  MRN: [id number]  Date of Service: 2024    Chief Complaint   Patient presents with    Wrist Injury     Left wrist pain and deformity, unknown how pt injuried      Discharge Recommendations:  Patient would benefit from continued therapy after discharge   PT Equipment Recommendations  Equipment Needed: No (rolling walker at home)      Patient Diagnosis(es): The primary encounter diagnosis was Hypotension, unspecified hypotension type. A diagnosis of Contusion of left wrist, initial encounter was also pertinent to this visit.  Past Medical History:  has a past medical history of CAD (coronary artery disease), Dementia (HCC), Diabetes mellitus (HCC), Fall, Hyperlipidemia, Hypertension, and Parkinsonian features.  Past Surgical History:  has a past surgical history that includes hip surgery (Right, 2024) and hip surgery (Right, 2024).    Assessment   Body Structures, Functions, Activity Limitations Requiring Skilled Therapeutic Intervention: Decreased functional mobility ;Decreased cognition;Decreased endurance;Decreased posture;Decreased strength;Decreased safe awareness    Assessment: Pt presents from home with non traumatic left wrist pain, (+) shock requiring Levophed. Pt had recent fall with R hip hemiarthroplasty on 24, is WBAT with posterior hip precautions. Prior to this admit, pt home with family requiring assist for ambulation with rolling walker, assist ADL's. Pt currently requiring mod assist for bed mobility, min x 2 transfers, pt ambulated 5-6ft with rolling walker and min assist x 2. Use of  services througout eval. Pt will require 24 hour assist at discharge. Pt will benefit from continued skilled PT for strengthening, safety, endurance, and functional mobility training while in the hospital and at discharge.    Therapy Prognosis: Good    Decision Making:

## 2024-08-09 NOTE — DISCHARGE SUMMARY
[facility]  Office: [phone]  Eugenio Davis DO, Sathish Michelle DO, Chito Duke DO, Tommie Camacho DO, Sanjay Koroma MD, Summer Salazar MD, Luis Enrique Beckman MD, Dhara Morales MD,  Jourdan Moore MD, Eris Boyle MD, Karlo Gibbs MD,  Rhys Das DO, Álvaro Couch MD, Carlos Bender MD, Luis Davis DO, Julia Fitzpatrick MD,  Lincoln Chamberlain DO, Argenis Lopez MD, Eleanor Johnson MD, Lara Murcia MD, Alise Newell MD,  Jesse Henderson MD, Torito Stacy MD, Celine Woo MD, Jerrica Rosales MD, Varinder Alves MD, Jade Can MD, Harrison Rodriguez DO, Matt Beatty DO, Cristina Figueroa MD,  Fransisco Ramirez MD, Shirley Waterhouse, CNP,  Mary Beth Tomas CNP, Alko Zimmerman, CNP,  Shanelle Oquendo, DNP, Gina Torrez, CNP, Steffany Hillman, CNP, Natty Guerra CNP, Mayra Sandoval, CNP, Elizabeth Durand, PA-C, Darline Khan PA-C, Jojo Figuerdeo, CNP, Quentin Lynne, CNP, Dominique Barton, CNP, Yomaira Curran, CNP, Nereyda Will, CNS, Briana Jane, CNP, Shyann Linton CNP, Tracy Schwab, CNP         [facility]   IN-PATIENT SERVICE   [facility]    Discharge Summary     Patient ID: Eliz Macias  :  1935   MRN: [id number]     ACCOUNT:  [id number]   Patient's PCP: Lee Sainz MD  Admit Date: 2024   Discharge Date: 2024   Length of Stay: 3  Code Status:  Prior  Admitting Physician: Luis Davis DO  Discharge Physician: Lincoln Chamberlain DO     Active Discharge Diagnoses:     Hospital Problem Lists:  Principal Problem:    Shock (HCC)  Active Problems:    Alzheimer's disease with early onset (HCC)    Chronic diastolic congestive heart failure (HCC)    Coronary artery disease involving native coronary artery of native heart without angina pectoris    Gastroesophageal reflux disease without esophagitis    Hyperlipidemia    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

## 2024-08-10 LAB
MICROORGANISM SPEC CULT: NORMAL
MICROORGANISM SPEC CULT: NORMAL
SERVICE CMNT-IMP: NORMAL
SERVICE CMNT-IMP: NORMAL
SPECIMEN DESCRIPTION: NORMAL
SPECIMEN DESCRIPTION: NORMAL

## 2024-08-14 NOTE — CARE COORDINATION
Social work: in addition to [facility] and passioate health services are planning to provide supportive services to keep pt in their own home. Sent as requested val and discharge summary so they can work with [facility] and pt/family. Shanelle dutta

## 2024-08-26 ENCOUNTER — OFFICE VISIT (OUTPATIENT)
Dept: ORTHOPEDIC SURGERY | Age: 89
End: 2024-08-26

## 2024-08-26 VITALS — RESPIRATION RATE: 14 BRPM | HEIGHT: 65 IN | BODY MASS INDEX: 25.33 KG/M2 | WEIGHT: 152 LBS

## 2024-08-26 DIAGNOSIS — Z96.649 S/P HIP HEMIARTHROPLASTY: Primary | ICD-10-CM

## 2024-08-26 PROCEDURE — 99024 POSTOP FOLLOW-UP VISIT: CPT | Performed by: STUDENT IN AN ORGANIZED HEALTH CARE EDUCATION/TRAINING PROGRAM

## 2024-08-26 NOTE — PROGRESS NOTES
[facility]  [address]  [place] [address]  Dept Phone: [phone]  Dept Fax: [phone]      Orthopaedic Trauma Clinic Follow Up      Subjective:   Date of Surgery: 7/5/2024    Eliz Macias is a 88 y.o. year old female who presents to the clinic today for follow up status post Right Franck Hip Arthroplasty secondary to femoral neck fracture.  Patient presents with her her son.  Patient's son states she has had no issues.  She has been ambulating.  No new injuries or falls.  Patient is somewhat nauseous today, and had some vomiting in the clinic.  Patient's son states that this is new just today, otherwise she has been doing well.    Review of Systems  Gen: no fever, chills, malaise  CV: no chest pain or palpitations  Resp: no cough or shortness of breath  GI: + vomiting, No diarrhea, or constipation  Neuro: no seizures, vertigo, or headache  Msk: Per HPI  10 remaining systems reviewed and negative    Objective :     Vitals:    08/26/24 1127   Resp: 14   Body mass index is 25.32 kg/m².  General: No acute distress, resting comfortably in the clinic  Neuro: alert. oriented  Eyes: Extra-ocular muscles intact  Pulm: Respirations unlabored and regular.  Skin: warm, well perfused  Psych:   Patient's son has good fund of knowledge and displays understanding of exam, diagnosis, and plan.  Right Lower Extremity: mature scar noted to extremity from prior intervention. Compartments soft/compressible. Extremity warm/well perfused. EHL/FHL/TA/GSC motor intact. Patient expresses normal sensation L3-S1 distribution.       Radiology:  Imaging studies from today were independently reviewed and read as listed below. Any relevant images obtained prior to today's visit were also independently interpreted.     Assessment:   88 y.o. year old female s/p Right Franck Hip Arthroplasty  Plan:   Lengthy discussion had with patient's son about current clinical state.  Weight-bear as tolerated right lower  extremity.  No restrictions.  Recommend lifelong walker use.  Patient will return as needed.  All questions answered patient's son amenable to this plan.      Electronically signed by Ramez Marvin DO on 8/26/2024 at 1:17 PM    This note is created with the assistance of a speech recognition program.  While intending to generate a document that actually reflects the content of the visit, the document can still have some errors including those of syntax and sound a like substitutions which may escape proof reading.  In such instances, actual meaning can be extrapolated by contextual diversion

## 2024-11-29 NOTE — PROGRESS NOTES
Orthopedic Progress Note    Patient:  Eliz Macias  YOB: 1935     88 y.o. female    Subjective:  Patient seen and examined this morning. No complaints or concerns. No issues overnight per nursing. Pain is well controlled on current regimen. Denies fever, HA, CP, SOB, N/V, dysuria, new numbness/tingling. No BM, but flatus +.     Vitals reviewed, afebrile    Objective:   Vitals:    07/06/24 0445   BP: (!) 139/93   Pulse: 79   Resp: 24   Temp: 97.6 °F (36.4 °C)   SpO2: 93%     Gen: NAD, cooperative    Cardiovascular: Regular rate   Respiratory: No acute respiratory distress, breathing comfortably    Orthopedic Exam  RLE:  Optifoam dressings are C/D/I.  Compartments are soft and is compressible.  Appropriately tender to palpation.  Motor functions grossly intact to the TA/GS/EHL/FHL motor complexes.  Sensation is intact to touch across the SPN/DPN/sural/saphenous/plantar nerve distributions.  Limb is warm and well-perfused with a brisk capillary refill.    Recent Labs     07/05/24  0409 07/06/24  0440   WBC  --  9.8   HGB  --  13.1   HCT  --  44.8   PLT  --  261   INR 1.1  --    NA  --  131*   K  --  4.9   BUN  --  26*   CREATININE  --  1.1*   GLUCOSE  --  133*      Impression 88 y.o. female who is being seen for:    -Right femoral neck fracture    Procedure, DOS: 7/5/2024  -Right hip hemiarthroplasty    Plan  - No further plans for orthopedic intervention at this time  - Post-op Ancef q8h x2 doses  - Optifoam on RLE. Do not remove optifoam dressings.  Okay to reinforce/maintain by nursing if necessary. Please notify Ortho if saturated.  - WBAT  to the RLE  - Pain control and medical management per primary: Internal medicine    - DVT ppx: EPC. Okay to start chemical anticoagulation POD#1 from orthopedic perspective At minimum recommend ASA 81mg BID for 30 days post-op on discharge if medically able.  - Ice/Elevate to control pain and edema  - Diet: Adult diet okay from orthopedic perspective  -  11/29/24  0833    143 143   K 3.7 4.0 3.8    110 110   CO2 21 23 23   PHOS 2.8 2.8 2.6   BUN 31* 27* 21*   CREATININE 1.4* 1.4* 1.2   MG 1.93 2.02 2.05     LIVP:   Recent Labs     11/27/24  0830 11/28/24  0829 11/29/24  0833   AST 31 26 23   ALT 27 26 25   BILIDIR 0.2 0.2 0.2   BILITOT 0.4 0.3 0.5   ALKPHOS 124 120 127     Coags: No results for input(s): \"PROTIME\", \"INR\", \"APTT\" in the last 72 hours.  Uric Acid No results for input(s): \"LABURIC\" in the last 72 hours.    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 Anxiety  3. Atrial fibrillation with rapid ventricular response s/p ablation  4. Diabetes mellitus type 2   5. Endometrial hyperplasia   6. Essential hypertension   7. GERD  8. Obstructive sleep apnea  9. Thyroid nodule        TREATMENT:            XRT to thoracic spine  Bendamustine / Rituxan x 6 cycles (ending 7/2016)  Rituxan maintenance x 12 months  Clinical Trial - Revlimid & Rituxan x 7 cycles (ending 12/2018)  CVP- R x 8 cycles (12/2018 - 6/2019)   3 cycles of R-ICE in 2019 followed by [employer] in 12/2019  XRT to mesentery in 2020      Lymphodepleting Chemotherapy:  Fludarabine and cyclophosphamide   Disease Status at time of Infusion: Ongoing Disease   CAR-T Infusion Date: 12/2/24  CAR-T Product: Breyanzi  Patient ID Number:  [id number]  JOIN #: [id number]     ASSESSMENT AND PLAN:           1. Relapsed refractory follicular lymphoma: Ongoing Disease   - Recent scan on 8/30/24 showed relapse with left supraclavicular and left paraaortic node, repeat bx consistent with follicular lymphoma   - Lymphodepleting chemotherapy is scheduled for 11/27/24 - 11/29/24, followed by Breyanzi CAR-T Infusion on 12/2/24.      PLAN:      Dr. Montgomery

## (undated) DEVICE — ZIPPERED TOGA, 2X LARGE: Brand: FLYTE

## (undated) DEVICE — SOLUTION IRRIG 3000ML 0.9% SOD CHL USP UROMATIC PLAS CONT

## (undated) DEVICE — 3M™ IOBAN™ 2 ANTIMICROBIAL INCISE DRAPE 6651EZ: Brand: IOBAN™ 2

## (undated) DEVICE — GLOVE ORANGE PI 8   MSG9080

## (undated) DEVICE — SUTURE ABSORBABLE BRAIDED 2-0 CT-1 27 IN UD VICRYL J259H

## (undated) DEVICE — CEMENT MIXING SYSTEM WITH FEMORAL BREAKWAY NOZZLE: Brand: REVOLUTION

## (undated) DEVICE — SUTURE PDS II SZ 0 L27IN ABSRB VLT L36MM CT-1 1/2 CIR Z340H

## (undated) DEVICE — HANDPIECE SET WITH COAXIAL HIGH FLOW TIP AND SUCTION TUBE: Brand: INTERPULSE

## (undated) DEVICE — STRAP,POSITIONING,KNEE/BODY,FOAM,4X60": Brand: MEDLINE

## (undated) DEVICE — Device

## (undated) DEVICE — SUTURE MONOCRYL SZ 3-0 L27IN ABSRB UD L24MM PS-1 3/8 CIR PRIM Y936H

## (undated) DEVICE — 450 ML BOTTLE OF 0.05% CHLORHEXIDINE GLUCONATE IN 99.95% STERILE WATER FOR IRRIGATION, USP AND APPLICATOR.: Brand: IRRISEPT ANTIMICROBIAL WOUND LAVAGE

## (undated) DEVICE — STRYKER PERFORMANCE SERIES SAGITTAL BLADE: Brand: STRYKER PERFORMANCE SERIES

## (undated) DEVICE — PILLOW POS W15XH6XL22IN RASPBERRY FOAM ABD W/ STRP DISP FOR

## (undated) DEVICE — SVMMC ORTH SPL DRP PK

## (undated) DEVICE — STRAP ARMBRD W1.5XL32IN FOAM STR YET SFT W/ HK AND LOOP

## (undated) DEVICE — APPLICATOR MEDICATED 10.5 CC SOLUTION HI LT ORNG CHLORAPREP

## (undated) DEVICE — COVER,MAYO STAND,STERILE: Brand: MEDLINE

## (undated) DEVICE — ADHESIVE SKIN CLOSURE WND 8.661X1.5 IN 22 CM LIQUIBAND SECUR

## (undated) DEVICE — STOCKINETTE,IMPERVIOUS,12X48,STERILE: Brand: MEDLINE

## (undated) DEVICE — GLOVE ORTHO 8   MSG9480

## (undated) DEVICE — DRAPE,U/ SHT,SPLIT,PLAS,STERIL: Brand: MEDLINE

## (undated) DEVICE — ZIPPERED TOGA, X-LARGE: Brand: FLYTE

## (undated) DEVICE — SUTURE MONOCRYL SZ 2-0 L27IN ABSRB UD SH L26MM TAPERPOINT NDL Y417H

## (undated) DEVICE — IMPLANTABLE DEVICE: Type: IMPLANTABLE DEVICE | Site: FEMUR | Status: NON-FUNCTIONAL

## (undated) DEVICE — SUTURE ETHIBOND EXCEL SZ 5 L30IN NONABSORBABLE GRN L48MM CCS MB47G